# Patient Record
Sex: FEMALE | Race: BLACK OR AFRICAN AMERICAN | NOT HISPANIC OR LATINO | URBAN - METROPOLITAN AREA
[De-identification: names, ages, dates, MRNs, and addresses within clinical notes are randomized per-mention and may not be internally consistent; named-entity substitution may affect disease eponyms.]

---

## 2017-10-06 RX ADMIN — Medication 650 MILLIGRAM(S): at 21:30

## 2018-10-06 ENCOUNTER — EMERGENCY (EMERGENCY)
Facility: HOSPITAL | Age: 84
LOS: 1 days | Discharge: ANOTHER HOSPITAL | End: 2018-10-06
Attending: STUDENT IN AN ORGANIZED HEALTH CARE EDUCATION/TRAINING PROGRAM
Payer: COMMERCIAL

## 2018-10-06 VITALS
TEMPERATURE: 97 F | RESPIRATION RATE: 18 BRPM | OXYGEN SATURATION: 100 % | DIASTOLIC BLOOD PRESSURE: 58 MMHG | HEART RATE: 95 BPM | SYSTOLIC BLOOD PRESSURE: 145 MMHG

## 2018-10-06 PROCEDURE — 73562 X-RAY EXAM OF KNEE 3: CPT | Mod: 26,RT

## 2018-10-06 PROCEDURE — 73130 X-RAY EXAM OF HAND: CPT | Mod: 26,RT

## 2018-10-06 PROCEDURE — 99285 EMERGENCY DEPT VISIT HI MDM: CPT | Mod: 25

## 2018-10-06 PROCEDURE — 70450 CT HEAD/BRAIN W/O DYE: CPT | Mod: 26

## 2018-10-06 PROCEDURE — 72125 CT NECK SPINE W/O DYE: CPT | Mod: 26

## 2018-10-06 PROCEDURE — 72190 X-RAY EXAM OF PELVIS: CPT | Mod: 26

## 2018-10-06 PROCEDURE — 70486 CT MAXILLOFACIAL W/O DYE: CPT | Mod: 26

## 2018-10-06 RX ORDER — ACETAMINOPHEN 500 MG
650 TABLET ORAL ONCE
Qty: 0 | Refills: 0 | Status: COMPLETED | OUTPATIENT
Start: 2018-10-06 | End: 2018-10-06

## 2018-10-06 RX ADMIN — Medication 650 MILLIGRAM(S): at 20:40

## 2018-10-06 NOTE — ED PROVIDER NOTE - MEDICAL DECISION MAKING DETAILS
patient presenting with mechanical fall. denies cp, sob, n, loc to suggest acs as cause of fall. will obtain xray to r.o fracture. ct head and cspine to r.o ich.

## 2018-10-06 NOTE — ED PROVIDER NOTE - PROGRESS NOTE DETAILS
Patient had incidental finding on ct head, patient remain neurologically intact. transferred to [facility] for further eval by neurosurg concern for tumor. patient protecting airway, aox3 on transfer

## 2018-10-06 NOTE — ED ADULT TRIAGE NOTE - CHIEF COMPLAINT QUOTE
s/p fall forward 3 mins ago,fell forward hit head,no loc nose swelling,headache, no nausea no vomiting

## 2018-10-06 NOTE — ED ADULT NURSE NOTE - OBJECTIVE STATEMENT
Pt stated "I fell, I was trying to go to the bathroom and I fell". Denies loc or headache. Abrasion noted to left side of forehead.

## 2018-10-06 NOTE — ED PROVIDER NOTE - ENMT, MLM
Airway patent, Nasal mucosa clear. Mouth with normal mucosa. Throat has no vesicles, no oropharyngeal exudates and uvula is midline.  left forehead hematoma noted on exam

## 2018-10-06 NOTE — ED PROVIDER NOTE - OBJECTIVE STATEMENT
89 y.o presenting with s.p mechanical fall. denies cp, sob, n, v, abd pain leading to fall. endorses pain to forehead. denies loc, neck pain.

## 2018-10-06 NOTE — ED ADULT NURSE NOTE - NSIMPLEMENTINTERV_GEN_ALL_ED
Implemented All Fall with Harm Risk Interventions:  [place] to call system. Call bell, personal items and telephone within reach. Instruct patient to call for assistance. Room bathroom lighting operational. Non-slip footwear when patient is off stretcher. Physically safe environment: no spills, clutter or unnecessary equipment. Stretcher in lowest position, wheels locked, appropriate side rails in place. Provide visual cue, wrist band, yellow gown, etc. Monitor gait and stability. Monitor for mental status changes and reorient to person, place, and time. Review medications for side effects contributing to fall risk. Reinforce activity limits and safety measures with patient and family. Provide visual clues: red socks.

## 2018-10-06 NOTE — ED PROVIDER NOTE - PMH
Anemia    Anxiety    Arthritis    Cataract    Cervical spinal stenosis    Diabetes mellitus  Type 2 IDDM  DUB (dysfunctional uterine bleeding)  hysterectomy age 31's  Dyslipidemia    HTN (hypertension)    Obesity    Spinal stenosis    Spinal stenosis, lumbar

## 2018-10-07 ENCOUNTER — INPATIENT (INPATIENT)
Facility: HOSPITAL | Age: 84
LOS: 2 days | Discharge: HOME | DRG: 54 | End: 2018-10-10
Attending: HOSPITALIST | Admitting: HOSPITALIST
Payer: COMMERCIAL

## 2018-10-07 VITALS
RESPIRATION RATE: 18 BRPM | OXYGEN SATURATION: 98 % | DIASTOLIC BLOOD PRESSURE: 74 MMHG | HEART RATE: 96 BPM | SYSTOLIC BLOOD PRESSURE: 147 MMHG | TEMPERATURE: 98 F

## 2018-10-07 VITALS — DIASTOLIC BLOOD PRESSURE: 78 MMHG | SYSTOLIC BLOOD PRESSURE: 156 MMHG | HEART RATE: 67 BPM | RESPIRATION RATE: 16 BRPM

## 2018-10-07 DIAGNOSIS — Z79.899 OTHER LONG TERM (CURRENT) DRUG THERAPY: ICD-10-CM

## 2018-10-07 DIAGNOSIS — S02.2XXA FRACTURE OF NASAL BONES, INITIAL ENCOUNTER FOR CLOSED FRACTURE: ICD-10-CM

## 2018-10-07 DIAGNOSIS — S62.90XA UNSPECIFIED FRACTURE OF UNSPECIFIED WRIST AND HAND, INITIAL ENCOUNTER FOR CLOSED FRACTURE: ICD-10-CM

## 2018-10-07 DIAGNOSIS — D64.9 ANEMIA, UNSPECIFIED: ICD-10-CM

## 2018-10-07 DIAGNOSIS — G93.9 DISORDER OF BRAIN, UNSPECIFIED: ICD-10-CM

## 2018-10-07 DIAGNOSIS — E11.9 TYPE 2 DIABETES MELLITUS WITHOUT COMPLICATIONS: ICD-10-CM

## 2018-10-07 DIAGNOSIS — N18.3 CHRONIC KIDNEY DISEASE, STAGE 3 (MODERATE): ICD-10-CM

## 2018-10-07 DIAGNOSIS — D72.829 ELEVATED WHITE BLOOD CELL COUNT, UNSPECIFIED: ICD-10-CM
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
## 2018-10-07 DIAGNOSIS — S69.91XA UNSPECIFIED INJURY OF RIGHT WRIST, HAND AND FINGER(S), INITIAL ENCOUNTER: ICD-10-CM

## 2018-10-07 LAB
ANION GAP SERPL CALC-SCNC: 10 MMOL/L — SIGNIFICANT CHANGE UP (ref 5–17)
ANION GAP SERPL CALC-SCNC: 11 MMOL/L — SIGNIFICANT CHANGE UP (ref 5–17)
APPEARANCE UR: ABNORMAL
APTT BLD: 36.8 SEC — SIGNIFICANT CHANGE UP (ref 27.5–37.4)
BACTERIA # UR AUTO: ABNORMAL
BILIRUB UR-MCNC: NEGATIVE — SIGNIFICANT CHANGE UP
BUN SERPL-MCNC: 14 MG/DL — SIGNIFICANT CHANGE UP (ref 7–18)
BUN SERPL-MCNC: 16 MG/DL — SIGNIFICANT CHANGE UP (ref 7–23)
CALCIUM SERPL-MCNC: 8.6 MG/DL — SIGNIFICANT CHANGE UP (ref 8.4–10.5)
CALCIUM SERPL-MCNC: 9 MG/DL — SIGNIFICANT CHANGE UP (ref 8.4–10.5)
CHLORIDE SERPL-SCNC: 108 MMOL/L — SIGNIFICANT CHANGE UP (ref 96–108)
CHLORIDE SERPL-SCNC: 110 MMOL/L — HIGH (ref 96–108)
CO2 SERPL-SCNC: 23 MMOL/L — SIGNIFICANT CHANGE UP (ref 22–31)
CO2 SERPL-SCNC: 24 MMOL/L — SIGNIFICANT CHANGE UP (ref 22–31)
COLOR SPEC: YELLOW — SIGNIFICANT CHANGE UP
CREAT SERPL-MCNC: 0.92 MG/DL — SIGNIFICANT CHANGE UP (ref 0.5–1.3)
CREAT SERPL-MCNC: 0.96 MG/DL — SIGNIFICANT CHANGE UP (ref 0.5–1.3)
DIFF PNL FLD: NEGATIVE — SIGNIFICANT CHANGE UP
EPI CELLS # UR: 20 /HPF — HIGH
GLUCOSE BLDC GLUCOMTR-MCNC: 226 MG/DL — HIGH (ref 70–99)
GLUCOSE BLDC GLUCOMTR-MCNC: 247 MG/DL — HIGH (ref 70–99)
GLUCOSE BLDC GLUCOMTR-MCNC: 253 MG/DL — HIGH (ref 70–99)
GLUCOSE BLDC GLUCOMTR-MCNC: 296 MG/DL — HIGH (ref 70–99)
GLUCOSE SERPL-MCNC: 123 MG/DL — HIGH (ref 70–99)
GLUCOSE SERPL-MCNC: 220 MG/DL — HIGH (ref 70–99)
GLUCOSE UR QL: NEGATIVE — SIGNIFICANT CHANGE UP
HCT VFR BLD CALC: 29.8 % — LOW (ref 34.5–45)
HCT VFR BLD CALC: 32.8 % — LOW (ref 34.5–45)
HGB BLD-MCNC: 10.5 G/DL — LOW (ref 11.5–15.5)
HGB BLD-MCNC: 9.4 G/DL — LOW (ref 11.5–15.5)
HYALINE CASTS # UR AUTO: 14 /LPF — HIGH (ref 0–2)
INR BLD: 1.04 RATIO — SIGNIFICANT CHANGE UP (ref 0.88–1.16)
KETONES UR-MCNC: ABNORMAL
LEUKOCYTE ESTERASE UR-ACNC: ABNORMAL
MCHC RBC-ENTMCNC: 26 PG — LOW (ref 27–34)
MCHC RBC-ENTMCNC: 26 PG — LOW (ref 27–34)
MCHC RBC-ENTMCNC: 31.5 GM/DL — LOW (ref 32–36)
MCHC RBC-ENTMCNC: 32 GM/DL — SIGNIFICANT CHANGE UP (ref 32–36)
MCV RBC AUTO: 81.2 FL — SIGNIFICANT CHANGE UP (ref 80–100)
MCV RBC AUTO: 82.3 FL — SIGNIFICANT CHANGE UP (ref 80–100)
NITRITE UR-MCNC: NEGATIVE — SIGNIFICANT CHANGE UP
PH UR: 6 — SIGNIFICANT CHANGE UP (ref 5–8)
PLATELET # BLD AUTO: 293 K/UL — SIGNIFICANT CHANGE UP (ref 150–400)
PLATELET # BLD AUTO: 296 K/UL — SIGNIFICANT CHANGE UP (ref 150–400)
POTASSIUM SERPL-MCNC: 3.5 MMOL/L — SIGNIFICANT CHANGE UP (ref 3.5–5.3)
POTASSIUM SERPL-MCNC: 3.7 MMOL/L — SIGNIFICANT CHANGE UP (ref 3.5–5.3)
POTASSIUM SERPL-SCNC: 3.5 MMOL/L — SIGNIFICANT CHANGE UP (ref 3.5–5.3)
POTASSIUM SERPL-SCNC: 3.7 MMOL/L — SIGNIFICANT CHANGE UP (ref 3.5–5.3)
PROT UR-MCNC: ABNORMAL
PROTHROM AB SERPL-ACNC: 11.4 SEC — SIGNIFICANT CHANGE UP (ref 9.8–12.7)
RBC # BLD: 3.62 M/UL — LOW (ref 3.8–5.2)
RBC # BLD: 4.03 M/UL — SIGNIFICANT CHANGE UP (ref 3.8–5.2)
RBC # FLD: 13.1 % — SIGNIFICANT CHANGE UP (ref 10.3–14.5)
RBC # FLD: 15 % — HIGH (ref 10.3–14.5)
RBC CASTS # UR COMP ASSIST: 3 /HPF — SIGNIFICANT CHANGE UP (ref 0–4)
SODIUM SERPL-SCNC: 143 MMOL/L — SIGNIFICANT CHANGE UP (ref 135–145)
SODIUM SERPL-SCNC: 143 MMOL/L — SIGNIFICANT CHANGE UP (ref 135–145)
SP GR SPEC: 1.02 — SIGNIFICANT CHANGE UP (ref 1.01–1.02)
UROBILINOGEN FLD QL: NEGATIVE — SIGNIFICANT CHANGE UP
WBC # BLD: 11.9 K/UL — HIGH (ref 3.8–10.5)
WBC # BLD: 6.8 K/UL — SIGNIFICANT CHANGE UP (ref 3.8–10.5)
WBC # FLD AUTO: 11.9 K/UL — HIGH (ref 3.8–10.5)
WBC # FLD AUTO: 6.8 K/UL — SIGNIFICANT CHANGE UP (ref 3.8–10.5)
WBC UR QL: 15 /HPF — HIGH (ref 0–5)

## 2018-10-07 PROCEDURE — 12345: CPT | Mod: NC

## 2018-10-07 PROCEDURE — 85730 THROMBOPLASTIN TIME PARTIAL: CPT

## 2018-10-07 PROCEDURE — 70450 CT HEAD/BRAIN W/O DYE: CPT

## 2018-10-07 PROCEDURE — 73140 X-RAY EXAM OF FINGER(S): CPT | Mod: 26,RT

## 2018-10-07 PROCEDURE — 85027 COMPLETE CBC AUTOMATED: CPT

## 2018-10-07 PROCEDURE — 99285 EMERGENCY DEPT VISIT HI MDM: CPT | Mod: 25

## 2018-10-07 PROCEDURE — 70553 MRI BRAIN STEM W/O & W/DYE: CPT | Mod: 26

## 2018-10-07 PROCEDURE — 72125 CT NECK SPINE W/O DYE: CPT

## 2018-10-07 PROCEDURE — 70486 CT MAXILLOFACIAL W/O DYE: CPT

## 2018-10-07 PROCEDURE — 72190 X-RAY EXAM OF PELVIS: CPT

## 2018-10-07 PROCEDURE — 80048 BASIC METABOLIC PNL TOTAL CA: CPT

## 2018-10-07 PROCEDURE — 85610 PROTHROMBIN TIME: CPT

## 2018-10-07 PROCEDURE — 73130 X-RAY EXAM OF HAND: CPT

## 2018-10-07 PROCEDURE — 99223 1ST HOSP IP/OBS HIGH 75: CPT

## 2018-10-07 PROCEDURE — 73562 X-RAY EXAM OF KNEE 3: CPT

## 2018-10-07 PROCEDURE — 73564 X-RAY EXAM KNEE 4 OR MORE: CPT | Mod: 26,RT

## 2018-10-07 RX ORDER — INSULIN ASPART 100 [IU]/ML
0 INJECTION, SOLUTION SUBCUTANEOUS
Qty: 0 | Refills: 0 | COMMUNITY

## 2018-10-07 RX ORDER — SIMVASTATIN 20 MG/1
1 TABLET, FILM COATED ORAL
Qty: 0 | Refills: 0 | COMMUNITY

## 2018-10-07 RX ORDER — LANSOPRAZOLE 15 MG/1
1 CAPSULE, DELAYED RELEASE ORAL
Qty: 0 | Refills: 0 | COMMUNITY

## 2018-10-07 RX ORDER — FOLIC ACID 0.8 MG
1 TABLET ORAL
Qty: 0 | Refills: 0 | COMMUNITY

## 2018-10-07 RX ORDER — HYDRALAZINE HCL 50 MG
1 TABLET ORAL
Qty: 0 | Refills: 0 | COMMUNITY

## 2018-10-07 RX ORDER — DEXTROSE 50 % IN WATER 50 %
12.5 SYRINGE (ML) INTRAVENOUS ONCE
Qty: 0 | Refills: 0 | Status: DISCONTINUED | OUTPATIENT
Start: 2018-10-07 | End: 2018-10-10

## 2018-10-07 RX ORDER — LEVETIRACETAM 250 MG/1
500 TABLET, FILM COATED ORAL
Qty: 0 | Refills: 0 | Status: DISCONTINUED | OUTPATIENT
Start: 2018-10-07 | End: 2018-10-09

## 2018-10-07 RX ORDER — DEXTROSE 50 % IN WATER 50 %
15 SYRINGE (ML) INTRAVENOUS ONCE
Qty: 0 | Refills: 0 | Status: DISCONTINUED | OUTPATIENT
Start: 2018-10-07 | End: 2018-10-10

## 2018-10-07 RX ORDER — AMLODIPINE BESYLATE 2.5 MG/1
1 TABLET ORAL
Qty: 0 | Refills: 0 | COMMUNITY

## 2018-10-07 RX ORDER — CHOLECALCIFEROL (VITAMIN D3) 125 MCG
1000 CAPSULE ORAL DAILY
Qty: 0 | Refills: 0 | Status: DISCONTINUED | OUTPATIENT
Start: 2018-10-07 | End: 2018-10-10

## 2018-10-07 RX ORDER — OXYCODONE HYDROCHLORIDE 5 MG/1
5 TABLET ORAL EVERY 6 HOURS
Qty: 0 | Refills: 0 | Status: DISCONTINUED | OUTPATIENT
Start: 2018-10-07 | End: 2018-10-10

## 2018-10-07 RX ORDER — HYDRALAZINE HCL 50 MG
25 TABLET ORAL EVERY 12 HOURS
Qty: 0 | Refills: 0 | Status: DISCONTINUED | OUTPATIENT
Start: 2018-10-07 | End: 2018-10-10

## 2018-10-07 RX ORDER — INSULIN LISPRO 100/ML
VIAL (ML) SUBCUTANEOUS
Qty: 0 | Refills: 0 | Status: DISCONTINUED | OUTPATIENT
Start: 2018-10-07 | End: 2018-10-10

## 2018-10-07 RX ORDER — INSULIN DEGLUDEC 100 U/ML
0 INJECTION, SOLUTION SUBCUTANEOUS
Qty: 0 | Refills: 0 | COMMUNITY

## 2018-10-07 RX ORDER — GLUCAGON INJECTION, SOLUTION 0.5 MG/.1ML
1 INJECTION, SOLUTION SUBCUTANEOUS ONCE
Qty: 0 | Refills: 0 | Status: DISCONTINUED | OUTPATIENT
Start: 2018-10-07 | End: 2018-10-10

## 2018-10-07 RX ORDER — DEXTROSE 50 % IN WATER 50 %
25 SYRINGE (ML) INTRAVENOUS ONCE
Qty: 0 | Refills: 0 | Status: DISCONTINUED | OUTPATIENT
Start: 2018-10-07 | End: 2018-10-10

## 2018-10-07 RX ORDER — INSULIN DEGLUDEC 100 U/ML
30 INJECTION, SOLUTION SUBCUTANEOUS
Qty: 0 | Refills: 0 | COMMUNITY

## 2018-10-07 RX ORDER — FOLIC ACID 0.8 MG
1 TABLET ORAL DAILY
Qty: 0 | Refills: 0 | Status: DISCONTINUED | OUTPATIENT
Start: 2018-10-07 | End: 2018-10-10

## 2018-10-07 RX ORDER — INSULIN LISPRO 100/ML
VIAL (ML) SUBCUTANEOUS AT BEDTIME
Qty: 0 | Refills: 0 | Status: DISCONTINUED | OUTPATIENT
Start: 2018-10-07 | End: 2018-10-10

## 2018-10-07 RX ORDER — TETANUS TOXOID, REDUCED DIPHTHERIA TOXOID AND ACELLULAR PERTUSSIS VACCINE, ADSORBED 5; 2.5; 8; 8; 2.5 [IU]/.5ML; [IU]/.5ML; UG/.5ML; UG/.5ML; UG/.5ML
0.5 SUSPENSION INTRAMUSCULAR ONCE
Qty: 0 | Refills: 0 | Status: COMPLETED | OUTPATIENT
Start: 2018-10-07 | End: 2018-10-07

## 2018-10-07 RX ORDER — METHIMAZOLE 10 MG/1
1 TABLET ORAL
Qty: 0 | Refills: 0 | COMMUNITY

## 2018-10-07 RX ORDER — SIMVASTATIN 20 MG/1
20 TABLET, FILM COATED ORAL AT BEDTIME
Qty: 0 | Refills: 0 | Status: DISCONTINUED | OUTPATIENT
Start: 2018-10-07 | End: 2018-10-10

## 2018-10-07 RX ORDER — LEVETIRACETAM 250 MG/1
500 TABLET, FILM COATED ORAL ONCE
Qty: 0 | Refills: 0 | Status: COMPLETED | OUTPATIENT
Start: 2018-10-07 | End: 2018-10-07

## 2018-10-07 RX ORDER — ACETAMINOPHEN 500 MG
650 TABLET ORAL EVERY 6 HOURS
Qty: 0 | Refills: 0 | Status: DISCONTINUED | OUTPATIENT
Start: 2018-10-07 | End: 2018-10-10

## 2018-10-07 RX ORDER — PANTOPRAZOLE SODIUM 20 MG/1
40 TABLET, DELAYED RELEASE ORAL
Qty: 0 | Refills: 0 | Status: DISCONTINUED | OUTPATIENT
Start: 2018-10-07 | End: 2018-10-10

## 2018-10-07 RX ORDER — INSULIN ASPART 100 [IU]/ML
6 INJECTION, SOLUTION SUBCUTANEOUS
Qty: 0 | Refills: 0 | COMMUNITY

## 2018-10-07 RX ORDER — INSULIN GLARGINE 100 [IU]/ML
5 INJECTION, SOLUTION SUBCUTANEOUS AT BEDTIME
Qty: 0 | Refills: 0 | Status: DISCONTINUED | OUTPATIENT
Start: 2018-10-07 | End: 2018-10-10

## 2018-10-07 RX ORDER — BENAZEPRIL HYDROCHLORIDE 40 MG/1
1 TABLET ORAL
Qty: 0 | Refills: 0 | COMMUNITY

## 2018-10-07 RX ORDER — AMLODIPINE BESYLATE 2.5 MG/1
10 TABLET ORAL DAILY
Qty: 0 | Refills: 0 | Status: DISCONTINUED | OUTPATIENT
Start: 2018-10-07 | End: 2018-10-10

## 2018-10-07 RX ADMIN — Medication 25 MILLIGRAM(S): at 17:18

## 2018-10-07 RX ADMIN — LEVETIRACETAM 500 MILLIGRAM(S): 250 TABLET, FILM COATED ORAL at 02:25

## 2018-10-07 RX ADMIN — OXYCODONE HYDROCHLORIDE 5 MILLIGRAM(S): 5 TABLET ORAL at 18:35

## 2018-10-07 RX ADMIN — Medication 2: at 13:12

## 2018-10-07 RX ADMIN — Medication 1 MILLIGRAM(S): at 13:35

## 2018-10-07 RX ADMIN — Medication 25 MILLIGRAM(S): at 06:12

## 2018-10-07 RX ADMIN — LEVETIRACETAM 500 MILLIGRAM(S): 250 TABLET, FILM COATED ORAL at 17:18

## 2018-10-07 RX ADMIN — Medication 3: at 09:09

## 2018-10-07 RX ADMIN — OXYCODONE HYDROCHLORIDE 5 MILLIGRAM(S): 5 TABLET ORAL at 17:48

## 2018-10-07 RX ADMIN — TETANUS TOXOID, REDUCED DIPHTHERIA TOXOID AND ACELLULAR PERTUSSIS VACCINE, ADSORBED 0.5 MILLILITER(S): 5; 2.5; 8; 8; 2.5 SUSPENSION INTRAMUSCULAR at 02:25

## 2018-10-07 RX ADMIN — INSULIN GLARGINE 5 UNIT(S): 100 INJECTION, SOLUTION SUBCUTANEOUS at 22:04

## 2018-10-07 RX ADMIN — LEVETIRACETAM 500 MILLIGRAM(S): 250 TABLET, FILM COATED ORAL at 07:53

## 2018-10-07 RX ADMIN — SIMVASTATIN 20 MILLIGRAM(S): 20 TABLET, FILM COATED ORAL at 22:04

## 2018-10-07 RX ADMIN — AMLODIPINE BESYLATE 10 MILLIGRAM(S): 2.5 TABLET ORAL at 06:12

## 2018-10-07 RX ADMIN — Medication 3: at 17:49

## 2018-10-07 NOTE — H&P ADULT - PROBLEM SELECTOR PLAN 8
-clarify home insulin regimen and complete med rec in AM (primary team responsible)  -Will place on low-dose basal bolus insulin therapy for now  -check fsg TID and bedtime  -monitor for hypoglycemia

## 2018-10-07 NOTE — CHART NOTE - NSCHARTNOTEFT_GEN_A_CORE
ISTOP: [id number] prescribed by Dr. Yoko Braun. Received alprazolam 0.25 mg 30 supply for 90 tablets last picked up on 9/10/2018. Percocet 120 tablets for 30 day supply last picked up 6/29/2018

## 2018-10-07 NOTE — H&P ADULT - NSHPLABSRESULTS_GEN_ALL_CORE
10.5   11.9  )-----------( 296      ( 07 Oct 2018 00:22 )             32.8       10    143  |  110<H>  |  14  ----------------------------<  123<H>  3.5   |  23  |  0.92    Ca    9.0      07 Oct 2018 00:22         PT/INR - ( 07 Oct 2018 00:22 )   PT: 11.4 sec;   INR: 1.04 ratio         PTT - ( 07 Oct 2018 00:22 )  PTT:36.8 sec       Urinalysis Basic - ( 07 Oct 2018 02:03 )    Color: Yellow / Appearance: Slightly Turbid / S.024 / pH: x  Gluc: x / Ketone: Small  / Bili: Negative / Urobili: Negative   Blood: x / Protein: 100 mg/dL / Nitrite: Negative   Leuk Esterase: Large / RBC: 3 /hpf / WBC 15 /hpf   Sq Epi: x / Non Sq Epi: 20 /hpf / Bacteria: Moderate    I personally reviewed & interpreted the lab findings above; CBC c/w mild leukocytosis and mild unchanged anemia from past. BMP c/w CKD III  I personally reviewed & interpreted the radiographic findings; Head CT c/w abnormal lesions on left frontal lobe  I personally reviewed & interpreted the EKG findings; Nonischemic

## 2018-10-07 NOTE — ED PROVIDER NOTE - MEDICAL DECISION MAKING DETAILS
89F s/p fall, presenting with an accidental finding of a brain mass. Will consult neurosx, will get basics, CT scan, admit for syncope workup and cancer workup.

## 2018-10-07 NOTE — CONSULT NOTE ADULT - SUBJECTIVE AND OBJECTIVE BOX
p ([id number])     HPI: 89F sent in from [facility] for neurosx eval. Pt had a fall today, head injury, no LOC and no ACs, unknown mechanism of fall, no dizziness before or after the fall. Pt denies chest pain, back pain or abd pain. Pt also denies any focal deficits. No change in vision. Pt has pain in the rt. middle finger and rt. knee. Pt's sent in for an abnormal lesion along the medial left frontal lobe with mild adjacent vasogenic edema and mild mass effect upon the frontal horn of the left lateral ventricle.Patient denies current headache, weakness, numbness, blurry vision, double vision, n/v.    PAST MEDICAL HISTORY   Cervical spinal stenosis  Anemia  DUB (dysfunctional uterine bleeding)  Cataract  Spinal stenosis  Obesity  HTN (hypertension)  Anxiety  Dyslipidemia  Spinal stenosis, lumbar  Arthritis  Diabetes mellitus    PAST SURGICAL HISTORY   Cervical spinal stenosis  L3- L5 laminectomy with fusion  History of colon polyps removal  History of total knee replacement  Hx of cataract surgery  H/O mastoidectomy  History of cholecystectomy  History of appendectomy  History of hysterectomy    aspirin (Stomach Upset)  codeine (Rash)  iodine (Swelling)  IV Contrast (Short breath)  penicillin (Swelling)  vancomycin (Hives)      MEDICATIONS:  Antibiotics:    Neuro:  levETIRAcetam 500 milliGRAM(s) Oral once    Anticoagulation:    Other:  diphtheria/tetanus/pertussis (acellular) Vaccine (ADAcel) 0.5 milliLiter(s) IntraMuscular once      SOCIAL HISTORY:   Occupation:   Marital Status:     FAMILY HISTORY:  No pertinent family history in first degree relatives      REVIEW OF SYSTEMS:  negative but for hpi  General:  Eyes:  ENT:  Cardiac:  Respiratory:  GI:  Musculoskeletal:   Skin:  Neurologic:   Psychiatric:     PHYSICAL EXAMINATION:   T(C): 36.6 (10-07-18 @ 01:29), Max: 36.6 (10-07-18 @ 01:29)  HR: 90 (10-07-18 @ 01:52) (67 - 96)  BP: 161/87 (10-07-18 @ 01:52) (145/58 - 161/87)  RR: 16 (10-07-18 @ 01:52) (16 - 18)  SpO2: 98% (10-07-18 @ 01:52) (98% - 100%)  Wt(kg): --    General Examination:     Neurologic Examination:           AOx3, FC, PERRL, EOMI, V1-3 intact, no facial, palate danette symmetric, tongue midline, shrug 5/5  5/5 throughout, no drift  SILT  No clonus or babinski    Left forehead abrasion, right knee and finger pain    LABS:                        10.5   11.9  )-----------( 296      ( 07 Oct 2018 00:22 )             32.8     10-07    143  |  110<H>  |  14  ----------------------------<  123<H>  3.5   |  23  |  0.92    Ca    9.0      07 Oct 2018 00:22      PT/INR - ( 07 Oct 2018 00:22 )   PT: 11.4 sec;   INR: 1.04 ratio         PTT - ( 07 Oct 2018 00:22 )  PTT:36.8 sec      RADIOLOGY & ADDITIONAL STUDIES:      IMPRESSION:    CT BRAIN: No acute intracranial hemorrhage. Abnormal lesion along the   medial left frontal lobe with mild adjacent vasogenic edema and mild mass   effect upon the frontal horn of the left lateral ventricle. It is not   clear whether this is intra or extra-axial. There is no midline shift or   herniation. MRI of the brain with and without contrast recommended for   further evaluation if there are no contraindications.    CERVICAL SPINE CT: No acute cervical spine fracture. Posterior fusion   hardware and laminectomies noted.                CELIA PHILIPPE M.D., ATTENDING RADIOLOGIST  This document has been electronically signed. Oct  6 2018 10:51PM

## 2018-10-07 NOTE — PROGRESS NOTE ADULT - PROBLEM SELECTOR PLAN 7
-Pt with right 3rd distal phalanx fracture with ecchymosis  -D/w hand sx pending full eval   -tylenol prn for pain control

## 2018-10-07 NOTE — H&P ADULT - NSHPREVIEWOFSYSTEMS_GEN_ALL_CORE
REVIEW OF SYSTEMS:    CONSTITUTIONAL: No fevers or chills  ENMT:  No ear pain, No vertigo or throat pain  EYES: No visual changes  or photophobia  NECK: No pain or stiffness  RESPIRATORY: No cough, wheezing, hemoptysis; No shortness of breath  CARDIOVASCULAR: No chest pain or palpitations  GASTROINTESTINAL: No abdominal or epigastric pain. No nausea, vomiting, or hematemesis; No diarrhea or constipation. No melena or hematochezia.  MUSCULOSKELETAL: No joint swelling  or warmth.  GENITOURINARY: No dysuria, frequency or hematuria  NEUROLOGICAL: No numbness or syncope. +fall  PSYCHIATRIC: No depression or anhedonia.  ENDOCRINE: No sx hypoglycemic episodes.  SKIN: No itching, burning, rashes, or lesions

## 2018-10-07 NOTE — CONSULT NOTE ADULT - ASSESSMENT
89F s/p fall possible syncopal episodefound to have possible brain mass on CTH   - No acute neurosurgical intervention  - Medicine eval for metastatic work up  - CT CAP  - MRI brain  w w/o  - Keppra 500BID for sz ppx  - At this time given lack of clear symptoms 2/2 mass and edema and morbidity of steroids would not recommend steroids, however if symptoms develop would recommned dec 4mg q6hrs

## 2018-10-07 NOTE — H&P ADULT - ASSESSMENT
89 yo F w/ hx of DM2, HTN, OA, spinal stenosis, CKD III, chronic anemia, hyperthyroidism presents after a fall found to have brain lesions.

## 2018-10-07 NOTE — H&P ADULT - PROBLEM SELECTOR PLAN 3
-renally dose meds  -avoid nephrotoxins  -family and pt agree w/ mri w/ contrast despite risk of contrast injury.

## 2018-10-07 NOTE — H&P ADULT - NSHPPHYSICALEXAM_GEN_ALL_CORE
PHYSICAL EXAM:  Vital Signs Last 24 Hrs  T(C): 36.8 (10-07-18 @ 01:52)  T(F): 98.2 (10-07-18 @ 01:52), Max: 98.2 (10-07-18 @ 01:52)  HR: 90 (10-07-18 @ 01:52) (67 - 96)  BP: 161/87 (10-07-18 @ 01:52)  BP(mean): --  RR: 16 (10-07-18 @ 01:52) (16 - 18)  SpO2: 98% (10-07-18 @ 01:52) (98% - 100%)  Wt(kg): --    Constitutional: NAD, awake and alert  EYES: EOMI  ENT:  +Hard of Hearing, no tonsillar exudates   Neck: Soft and supple , no thyromegaly   Respiratory: Breath sounds are clear bilaterally, No wheezing, rales or rhonchi  Cardiovascular: S1 and S2, regular rate and rhythm, no Murmurs, gallops or rubs, no JVD,    Gastrointestinal: Bowel Sounds present, soft, nontender, nondistended, no guarding, no rebound  Extremities: No cyanosis or clubbing; warm to touch  Vascular: 2+ peripheral pulses lower ex  Neurological: No focal deficits, CN II-XII intact bilaterally, sensation to light touch intact in all extremities, gait intact. Pupils are equally reactive to light and symmetrical in size.   Musculoskeletal: 5/5 strength b/l upper and lower extremities; no joint swelling.  Skin: No rashes  Psych: no depression or anhedonia, AAOx3  HEME: no bruises, no nose bleeds PHYSICAL EXAM:  Vital Signs Last 24 Hrs  T(C): 36.8 (10-07-18 @ 01:52)  T(F): 98.2 (10-07-18 @ 01:52), Max: 98.2 (10-07-18 @ 01:52)  HR: 90 (10-07-18 @ 01:52) (67 - 96)  BP: 161/87 (10-07-18 @ 01:52)  BP(mean): --  RR: 16 (10-07-18 @ 01:52) (16 - 18)  SpO2: 98% (10-07-18 @ 01:52) (98% - 100%)  Wt(kg): --    Constitutional: NAD, awake and alert  EYES: EOMI  ENT:  +Hard of Hearing, no tonsillar exudates   Neck: Soft and supple , no thyromegaly   Respiratory: Breath sounds are clear bilaterally, No wheezing, rales or rhonchi  Cardiovascular: S1 and S2, regular rate and rhythm, no Murmurs, gallops or rubs, no JVD,    Gastrointestinal: Bowel Sounds present, soft, nontender, nondistended, no guarding, no rebound  Extremities: No cyanosis or clubbing; warm to touch  Vascular: 2+ peripheral pulses lower ex; radial pulses strong and intact in both hands.   Neurological: No focal deficits, CN II-XII intact bilaterally, sensation to light touch intact in all extremities, gait deferred; Pupils are equally reactive to light and symmetrical in size.   Musculoskeletal: 5/5 strength b/l upper and lower extremities; no joint swelling. No pain on palpation of trochanteric bursa or groin region.  Skin: +frontal head bruise. +right knee bruise. Right index finger ecchymotic.   Psych: no depression or anhedonia, AAOx3  HEME: no nose bleeds. No cervical lymphadenopathy

## 2018-10-07 NOTE — H&P ADULT - HISTORY OF PRESENT ILLNESS
88 yo F w/ hx of DM2, HTN, OA, spinal stenosis, CKD III, chronic anemia, hyperthyroidism presents after a fall. Patient reports she was at home, got up to use the bathroom and lost balance. She fell forwarded, landed on right knee and right outstretched hand. Patient reports also head injury. She did not lose consciousness. She did not have any prodrome symptoms or chest pain. She denies urinary/fecal incontinence or post-ictal state after the fall. The fall was witnessed by her daughter who helped her get up. The patient was able to bear weight and ambulate without any hip or groin pain. She was driven by her daughter to [facility]. Head imaging there was found to have concern for brain mass and patient was transferred to our hospital. Patient currently has no symptoms.

## 2018-10-07 NOTE — PROGRESS NOTE ADULT - PROBLEM SELECTOR PLAN 5
-This is likely leukemoid rxn, no signs of infection; UA most likely dirty sample as patient is asymptomatic.  -Resolved

## 2018-10-07 NOTE — ED PROVIDER NOTE - ATTENDING CONTRIBUTION TO CARE
89 F trasnferred from [facility] for neurosx eval. Pt s/pfall of unknown mechanism today with head trauma, no LOC. CTB brain showing vasogenic mass concerning for poss malignancy. Pt denies chest pain, back pain or abd pain. Pt also denies any focal deficits. No change in vision. Pt has pain in the rt. middle finger and rt. knee. Pt's sent in for an 89 F transferred from [place] for neurosx eval. Pt s/p fall of unknown mechanism today with head trauma, no LOC. CTB brain showing vasogenic mass concerning for poss malignancy. Pt denies ha, dizziness, vision/hearing changes, neck pain, chest pain, back pain or abd pain, numbness or weakness. . Pt has pain in the rt. middle finger and rt. knee pain since fall.     GEN: no acute respiratory distress. nontoxic, speaking comfortably in full sentences   HEENT: NCAT. face symmetrical. PERRL 4mm, EOMI, MMM, oropharynx wnl.  Neck: no JVD, trachea midline, no LAD  CV: RRR. +S1S2, no murmur. 2+ pulses in 4 extremities  Chest: CTA B/l. no wheezing, rales, rhonchi. no retractions. good air movement  ABD: +BS, soft, non distended, non tender.   MSK: No clubbing, cyanosis, edema. FROM of all extremities.  right knee and middle finger ttp with FROM. No midline or paraspinal tenderness. no spinal step-offs.  Neuro: AOOX3.  CN 2-12 intact; Sensation intact, motor 5/5 throughout.    poss syncope and fall with brain lesion. right middle finger and knee pain. labs , xr, NSx consult, admit.

## 2018-10-07 NOTE — ED PROVIDER NOTE - PSH
H/O mastoidectomy  Right ear  History of appendectomy  1960's  History of cholecystectomy  1960's  History of colon polyps removal  colonoscopy 2010  History of hysterectomy  age: 30's  History of total knee replacement  right 2006  left 2011  Hx of cataract surgery  bilateral 2008  L3- L5 laminectomy with fusion  2/2013

## 2018-10-07 NOTE — H&P ADULT - PROBLEM SELECTOR PLAN 1
-Patient presents with new brain lesions. Seen by neurosx who recommends brain MRI w/ and w/o contrast. Pt has allergies IV contrast but iodinated contrast agents in setting of CT scan. D/w patient and family at bedside who agree with brain MRI despite the risk of gadolinium induced fibrosis and renal damage.   -differential includes mets, less likely HIV or infection (toxo, neurocystericosis etc...)  -hold steroids for now unless symptomatic  -keppra for seizure ppx per neurosx recommendations

## 2018-10-07 NOTE — PROGRESS NOTE ADULT - PROBLEM SELECTOR PLAN 1
-Patient presents with new left frontal lobe brain lesions.   -Seen by neurosx who recommends brain MRI w/ and w/o contrast.   -Pt has allergies IV contrast but iodinated contrast agents in setting of CT scan. D/w patient and family at bedside who agree with brain MRI despite the risk of gadolinium induced fibrosis and renal damage.   -hold steroids for now unless symptomatic  -keppra for seizure ppx per neurosx recommendations

## 2018-10-07 NOTE — ED ADULT NURSE NOTE - OBJECTIVE STATEMENT
89F bibems as transfer from [facility] ED for neurosx consult. Patient aaox4 ambulatory and independent at home with h/o HTN and DM, ata knee replacement had a fall at home and fell on her rt knee and face forward, abrasion and bruising on the forehead and nasal area and rt knee noted, [facility], denies any chills or fever, dizziness, sob, chest pain, abd pain, nausea, vomiting or urinary symptoms, VS wnl. CT scan noted lesion on the brain, pending neuro sx consult.

## 2018-10-07 NOTE — H&P ADULT - PROBLEM SELECTOR PLAN 6
-This is likely leukemoid rxn, no signs of infection -This is likely leukemoid rxn, no signs of infection; UA most likely dirty sample as patient is asymptomatic.

## 2018-10-07 NOTE — ED PROVIDER NOTE - OBJECTIVE STATEMENT
89[facility]. Pt had a fall today, head injury, no LOC and no ACs, unknown mechanism of fall, no dizziness before or after the fall. Pt denies chest pain, back pain or abd pain. Pt also denies any focal deficits. No change in vision. Pt has pain in the rt. middle finger and rt. knee. Pt's sent in for an abnormal lesion along the medial left frontal lobe with mild adjacent vasogenic edema and mild mass effect upon the frontal horn of the left lateral ventricle.

## 2018-10-07 NOTE — ED ADULT NURSE NOTE - NSIMPLEMENTINTERV_GEN_ALL_ED
Implemented All Fall with Harm Risk Interventions:  [place] to call system. Call bell, personal items and telephone within reach. Instruct patient to call for assistance. Room bathroom lighting operational. Non-slip footwear when patient is off stretcher. Physically safe environment: no spills, clutter or unnecessary equipment. Stretcher in lowest position, wheels locked, appropriate side rails in place. Provide visual cue, wrist band, yellow gown, etc. Monitor gait and stability. Monitor for mental status changes and reorient to person, place, and time. Review medications for side effects contributing to fall risk. Reinforce activity limits and safety measures with patient and family. Provide visual clues: red socks.

## 2018-10-07 NOTE — ED ADULT NURSE REASSESSMENT NOTE - NS ED NURSE REASSESS COMMENT FT1
vss; pt without complaints awaiting bed assign; family at bedside; neuro exam done; pt passed dysphagia screen; pt medicated with keppra; safety/comfort maintained

## 2018-10-07 NOTE — H&P ADULT - PROBLEM SELECTOR PLAN 4
Pharmacy team emailed to verify insulin home dose. Otherwise the other medications are accurate per patient and daughter

## 2018-10-07 NOTE — PROGRESS NOTE ADULT - SUBJECTIVE AND OBJECTIVE BOX
Patient is a 89y old  Female who presents with a chief complaint of fall (07 Oct 2018 04:18)      SUBJECTIVE / OVERNIGHT EVENTS: pt reports she feels ok, denies headache or visual changes, no cp, had bm, family at bedside     MEDICATIONS  (STANDING):  amLODIPine   Tablet 10 milliGRAM(s) Oral daily  dextrose 50% Injectable 12.5 Gram(s) IV Push once  dextrose 50% Injectable 25 Gram(s) IV Push once  dextrose 50% Injectable 25 Gram(s) IV Push once  folic acid 1 milliGRAM(s) Oral daily  hydrALAZINE 25 milliGRAM(s) Oral every 12 hours  insulin glargine Injectable (LANTUS) 5 Unit(s) SubCutaneous at bedtime  insulin lispro (HumaLOG) corrective regimen sliding scale   SubCutaneous three times a day before meals  insulin lispro (HumaLOG) corrective regimen sliding scale   SubCutaneous at bedtime  levETIRAcetam 500 milliGRAM(s) Oral two times a day  methimazole 5 milliGRAM(s) Oral daily  pantoprazole    Tablet 40 milliGRAM(s) Oral before breakfast  simvastatin 20 milliGRAM(s) Oral at bedtime    MEDICATIONS  (PRN):  acetaminophen   Tablet .. 650 milliGRAM(s) Oral every 6 hours PRN Moderate Pain (4 - 6), Severe Pain (7 - 10)  dextrose 40% Gel 15 Gram(s) Oral once PRN Blood Glucose LESS THAN 70 milliGRAM(s)/deciliter  glucagon  Injectable 1 milliGRAM(s) IntraMuscular once PRN Glucose LESS THAN 70 milligrams/deciliter        CAPILLARY BLOOD GLUCOSE      POCT Blood Glucose.: 247 mg/dL (07 Oct 2018 12:46)  POCT Blood Glucose.: 253 mg/dL (07 Oct 2018 08:53)    I&O's Summary      PHYSICAL EXAM:  GENERAL: NAD, well-developed  HEAD:  Atraumatic, Normocephalic  EYES: conjunctiva and sclera clear  NECK: No JVD  CEST/LUNG: Clear to auscultation bilaterally; No wheeze  HEART: Regular rate and rhythm; S1S2  ABDOMEN: Soft, Nontender, Nondistended; Bowel sounds present  EXTREMITIES:  2+ Peripheral Pulses, No clubbing, cyanosis, or edema  PSYCH: AAOx3  NEUROLOGY: non-focal  SKIN: No rashes or lesions    LABS:                        9.4    6.80  )-----------( 293      ( 07 Oct 2018 10:15 )             29.8     10-07    143  |  108  |  16  ----------------------------<  220<H>  3.7   |  24  |  0.96    Ca    8.6      07 Oct 2018 07:41      PT/INR - ( 07 Oct 2018 00:22 )   PT: 11.4 sec;   INR: 1.04 ratio         PTT - ( 07 Oct 2018 00:22 )  PTT:36.8 sec      Urinalysis Basic - ( 07 Oct 2018 02:03 )    Color: Yellow / Appearance: Slightly Turbid / S.024 / pH: x  Gluc: x / Ketone: Small  / Bili: Negative / Urobili: Negative   Blood: x / Protein: 100 mg/dL / Nitrite: Negative   Leuk Esterase: Large / RBC: 3 /hpf / WBC 15 /hpf   Sq Epi: x / Non Sq Epi: 20 /hpf / Bacteria: Moderate        RADIOLOGY & ADDITIONAL TESTS:    Imaging Personally Reviewed:    Consultant(s) Notes Reviewed:  neurosx     Care Discussed with Consultants/Other Providers: Hand sx

## 2018-10-08 LAB
ALBUMIN SERPL ELPH-MCNC: 3.4 G/DL — SIGNIFICANT CHANGE UP (ref 3.3–5)
ALP SERPL-CCNC: 65 U/L — SIGNIFICANT CHANGE UP (ref 40–120)
ALT FLD-CCNC: 8 U/L — LOW (ref 10–45)
ANION GAP SERPL CALC-SCNC: 10 MMOL/L — SIGNIFICANT CHANGE UP (ref 5–17)
AST SERPL-CCNC: 16 U/L — SIGNIFICANT CHANGE UP (ref 10–40)
BASOPHILS # BLD AUTO: 0 K/UL — SIGNIFICANT CHANGE UP (ref 0–0.2)
BASOPHILS NFR BLD AUTO: 0.2 % — SIGNIFICANT CHANGE UP (ref 0–2)
BILIRUB SERPL-MCNC: 0.3 MG/DL — SIGNIFICANT CHANGE UP (ref 0.2–1.2)
BUN SERPL-MCNC: 13 MG/DL — SIGNIFICANT CHANGE UP (ref 7–23)
CALCIUM SERPL-MCNC: 8.5 MG/DL — SIGNIFICANT CHANGE UP (ref 8.4–10.5)
CHLORIDE SERPL-SCNC: 109 MMOL/L — HIGH (ref 96–108)
CO2 SERPL-SCNC: 23 MMOL/L — SIGNIFICANT CHANGE UP (ref 22–31)
CREAT SERPL-MCNC: 0.98 MG/DL — SIGNIFICANT CHANGE UP (ref 0.5–1.3)
CULTURE RESULTS: SIGNIFICANT CHANGE UP
EOSINOPHIL # BLD AUTO: 0.2 K/UL — SIGNIFICANT CHANGE UP (ref 0–0.5)
EOSINOPHIL NFR BLD AUTO: 4 % — SIGNIFICANT CHANGE UP (ref 0–6)
GLUCOSE BLDC GLUCOMTR-MCNC: 151 MG/DL — HIGH (ref 70–99)
GLUCOSE BLDC GLUCOMTR-MCNC: 184 MG/DL — HIGH (ref 70–99)
GLUCOSE BLDC GLUCOMTR-MCNC: 255 MG/DL — HIGH (ref 70–99)
GLUCOSE BLDC GLUCOMTR-MCNC: 268 MG/DL — HIGH (ref 70–99)
GLUCOSE BLDC GLUCOMTR-MCNC: 310 MG/DL — HIGH (ref 70–99)
GLUCOSE SERPL-MCNC: 187 MG/DL — HIGH (ref 70–99)
HBA1C BLD-MCNC: 8.1 % — HIGH (ref 4–5.6)
HCT VFR BLD CALC: 27.5 % — LOW (ref 34.5–45)
HCT VFR BLD CALC: 30.7 % — LOW (ref 34.5–45)
HGB BLD-MCNC: 10.1 G/DL — LOW (ref 11.5–15.5)
HGB BLD-MCNC: 8.7 G/DL — LOW (ref 11.5–15.5)
LYMPHOCYTES # BLD AUTO: 1.5 K/UL — SIGNIFICANT CHANGE UP (ref 1–3.3)
LYMPHOCYTES # BLD AUTO: 28.4 % — SIGNIFICANT CHANGE UP (ref 13–44)
MAGNESIUM SERPL-MCNC: 1.7 MG/DL — SIGNIFICANT CHANGE UP (ref 1.6–2.6)
MCHC RBC-ENTMCNC: 25.9 PG — LOW (ref 27–34)
MCHC RBC-ENTMCNC: 26.9 PG — LOW (ref 27–34)
MCHC RBC-ENTMCNC: 31.6 GM/DL — LOW (ref 32–36)
MCHC RBC-ENTMCNC: 32.8 GM/DL — SIGNIFICANT CHANGE UP (ref 32–36)
MCV RBC AUTO: 81.8 FL — SIGNIFICANT CHANGE UP (ref 80–100)
MCV RBC AUTO: 81.8 FL — SIGNIFICANT CHANGE UP (ref 80–100)
MONOCYTES # BLD AUTO: 0.6 K/UL — SIGNIFICANT CHANGE UP (ref 0–0.9)
MONOCYTES NFR BLD AUTO: 11.5 % — SIGNIFICANT CHANGE UP (ref 2–14)
NEUTROPHILS # BLD AUTO: 3 K/UL — SIGNIFICANT CHANGE UP (ref 1.8–7.4)
NEUTROPHILS NFR BLD AUTO: 55.9 % — SIGNIFICANT CHANGE UP (ref 43–77)
PHOSPHATE SERPL-MCNC: 1.9 MG/DL — LOW (ref 2.5–4.5)
PLATELET # BLD AUTO: 270 K/UL — SIGNIFICANT CHANGE UP (ref 150–400)
PLATELET # BLD AUTO: 298 K/UL — SIGNIFICANT CHANGE UP (ref 150–400)
POTASSIUM SERPL-MCNC: 3.6 MMOL/L — SIGNIFICANT CHANGE UP (ref 3.5–5.3)
POTASSIUM SERPL-SCNC: 3.6 MMOL/L — SIGNIFICANT CHANGE UP (ref 3.5–5.3)
PROT SERPL-MCNC: 6.2 G/DL — SIGNIFICANT CHANGE UP (ref 6–8.3)
RBC # BLD: 3.36 M/UL — LOW (ref 3.8–5.2)
RBC # BLD: 3.75 M/UL — LOW (ref 3.8–5.2)
RBC # FLD: 13 % — SIGNIFICANT CHANGE UP (ref 10.3–14.5)
RBC # FLD: 13 % — SIGNIFICANT CHANGE UP (ref 10.3–14.5)
SODIUM SERPL-SCNC: 142 MMOL/L — SIGNIFICANT CHANGE UP (ref 135–145)
SPECIMEN SOURCE: SIGNIFICANT CHANGE UP
WBC # BLD: 5.4 K/UL — SIGNIFICANT CHANGE UP (ref 3.8–10.5)
WBC # BLD: 7.2 K/UL — SIGNIFICANT CHANGE UP (ref 3.8–10.5)
WBC # FLD AUTO: 5.4 K/UL — SIGNIFICANT CHANGE UP (ref 3.8–10.5)
WBC # FLD AUTO: 7.2 K/UL — SIGNIFICANT CHANGE UP (ref 3.8–10.5)

## 2018-10-08 PROCEDURE — 99233 SBSQ HOSP IP/OBS HIGH 50: CPT | Mod: GC

## 2018-10-08 PROCEDURE — 74177 CT ABD & PELVIS W/CONTRAST: CPT | Mod: 26

## 2018-10-08 RX ORDER — ONDANSETRON 8 MG/1
4 TABLET, FILM COATED ORAL ONCE
Qty: 0 | Refills: 0 | Status: DISCONTINUED | OUTPATIENT
Start: 2018-10-08 | End: 2018-10-08

## 2018-10-08 RX ORDER — POTASSIUM PHOSPHATE, MONOBASIC POTASSIUM PHOSPHATE, DIBASIC 236; 224 MG/ML; MG/ML
15 INJECTION, SOLUTION INTRAVENOUS ONCE
Qty: 0 | Refills: 0 | Status: COMPLETED | OUTPATIENT
Start: 2018-10-08 | End: 2018-10-08

## 2018-10-08 RX ORDER — DIPHENHYDRAMINE HCL 50 MG
50 CAPSULE ORAL ONCE
Qty: 0 | Refills: 0 | Status: DISCONTINUED | OUTPATIENT
Start: 2018-10-08 | End: 2018-10-08

## 2018-10-08 RX ORDER — ONDANSETRON 8 MG/1
4 TABLET, FILM COATED ORAL ONCE
Qty: 0 | Refills: 0 | Status: COMPLETED | OUTPATIENT
Start: 2018-10-08 | End: 2018-10-08

## 2018-10-08 RX ORDER — HYDRALAZINE HCL 50 MG
10 TABLET ORAL ONCE
Qty: 0 | Refills: 0 | Status: COMPLETED | OUTPATIENT
Start: 2018-10-08 | End: 2018-10-08

## 2018-10-08 RX ORDER — DIPHENHYDRAMINE HCL 50 MG
50 CAPSULE ORAL ONCE
Qty: 0 | Refills: 0 | Status: COMPLETED | OUTPATIENT
Start: 2018-10-08 | End: 2018-10-08

## 2018-10-08 RX ADMIN — Medication 25 MILLIGRAM(S): at 17:19

## 2018-10-08 RX ADMIN — Medication 50 MILLIGRAM(S): at 20:27

## 2018-10-08 RX ADMIN — OXYCODONE HYDROCHLORIDE 5 MILLIGRAM(S): 5 TABLET ORAL at 08:49

## 2018-10-08 RX ADMIN — ONDANSETRON 4 MILLIGRAM(S): 8 TABLET, FILM COATED ORAL at 20:27

## 2018-10-08 RX ADMIN — LEVETIRACETAM 500 MILLIGRAM(S): 250 TABLET, FILM COATED ORAL at 05:19

## 2018-10-08 RX ADMIN — Medication 3: at 09:32

## 2018-10-08 RX ADMIN — SIMVASTATIN 20 MILLIGRAM(S): 20 TABLET, FILM COATED ORAL at 22:32

## 2018-10-08 RX ADMIN — PANTOPRAZOLE SODIUM 40 MILLIGRAM(S): 20 TABLET, DELAYED RELEASE ORAL at 05:19

## 2018-10-08 RX ADMIN — INSULIN GLARGINE 5 UNIT(S): 100 INJECTION, SOLUTION SUBCUTANEOUS at 22:30

## 2018-10-08 RX ADMIN — Medication 1000 UNIT(S): at 12:20

## 2018-10-08 RX ADMIN — Medication 25 MILLIGRAM(S): at 05:19

## 2018-10-08 RX ADMIN — Medication 1: at 22:20

## 2018-10-08 RX ADMIN — LEVETIRACETAM 500 MILLIGRAM(S): 250 TABLET, FILM COATED ORAL at 17:19

## 2018-10-08 RX ADMIN — Medication 10 MILLIGRAM(S): at 22:00

## 2018-10-08 RX ADMIN — Medication 1 MILLIGRAM(S): at 12:20

## 2018-10-08 RX ADMIN — AMLODIPINE BESYLATE 10 MILLIGRAM(S): 2.5 TABLET ORAL at 05:19

## 2018-10-08 RX ADMIN — Medication 40 MILLIGRAM(S): at 17:19

## 2018-10-08 RX ADMIN — POTASSIUM PHOSPHATE, MONOBASIC POTASSIUM PHOSPHATE, DIBASIC 62.5 MILLIMOLE(S): 236; 224 INJECTION, SOLUTION INTRAVENOUS at 08:49

## 2018-10-08 RX ADMIN — Medication 4: at 13:26

## 2018-10-08 NOTE — PROGRESS NOTE ADULT - SUBJECTIVE AND OBJECTIVE BOX
Plastic Surgery Consult Note  (pg [facility]: [id number], NS: [phone])    HPI:  89 yo F w/ hx of DM2, HTN, OA, spinal stenosis, CKD III, chronic anemia, hyperthyroidism presents after a fall. Patient reports she was at home, got up to use the bathroom and lost balance. She fell forwarded, landed on right knee and right outstretched hand. Patient reports also head injury. She did not lose consciousness. She did not have any prodrome symptoms or chest pain. She denies urinary/fecal incontinence or post-ictal state after the fall. The fall was witnessed by her daughter who helped her get up. The patient was able to bear weight and ambulate without any hip or groin pain. She was driven by her daughter to [facility]. Head imaging there was found to have concern for brain mass and patient was transferred to our hospital. Patient currently has no symptoms. (07 Oct 2018 04:18)      PAST MEDICAL & SURGICAL HISTORY:  Cervical spinal stenosis  Anemia  DUB (dysfunctional uterine bleeding): hysterectomy age 31&#x27;s  Cataract  Spinal stenosis  Obesity  HTN (hypertension)  Anxiety  Dyslipidemia  Spinal stenosis, lumbar  Arthritis  Diabetes mellitus: Type 2 IDDM  L3- L5 laminectomy with fusion: 2013  History of colon polyps removal: colonoscopy   History of total knee replacement: right   left   Hx of cataract surgery: bilateral   H/O mastoidectomy: Right ear  History of cholecystectomy: 1960&#x27;s  History of appendectomy: 1960&#x27;s  History of hysterectomy: age: 31&#x27;s      Allergies    aspirin (Stomach Upset)  codeine (Rash)  iodine (Swelling)  IV Contrast (Short breath)  penicillin (Swelling)  vancomycin (Hives)    Intolerances        Home Medications:  amLODIPine 10 mg oral tablet: 1 tab(s) orally once a day (07 Oct 2018 08:39)  benazepril 40 mg oral tablet: 1 tab(s) orally once a day (07 Oct 2018 08:39)  folic acid 1 mg oral tablet: 1 tab(s) orally once a day (07 Oct 2018 08:39)  hydrALAZINE 25 mg oral tablet: 1 tab(s) orally 2 times a day (07 Oct 2018 08:39)  lansoprazole 30 mg oral delayed release capsule: 1 cap(s) orally once a day (07 Oct 2018 08:39)  methIMAzole 5 mg oral tablet: 1 tab(s) orally once a day (07 Oct 2018 08:39)  NovoLOG FlexPen 100 units/mL injectable solution: 6 unit(s) injectable 3 times a day (07 Oct 2018 08:39)  simvastatin 20 mg oral tablet: 1 tab(s) orally once a day (at bedtime) (07 Oct 2018 08:39)  Tresiba FlexTouch: 30 unit(s) subcutaneous once a day (at bedtime) (07 Oct 2018 08:39)      MEDICATIONS  (STANDING):  amLODIPine   Tablet 10 milliGRAM(s) Oral daily  cholecalciferol 1000 Unit(s) Oral daily  dextrose 50% Injectable 12.5 Gram(s) IV Push once  dextrose 50% Injectable 25 Gram(s) IV Push once  dextrose 50% Injectable 25 Gram(s) IV Push once  folic acid 1 milliGRAM(s) Oral daily  hydrALAZINE 25 milliGRAM(s) Oral every 12 hours  insulin glargine Injectable (LANTUS) 5 Unit(s) SubCutaneous at bedtime  insulin lispro (HumaLOG) corrective regimen sliding scale   SubCutaneous three times a day before meals  insulin lispro (HumaLOG) corrective regimen sliding scale   SubCutaneous at bedtime  levETIRAcetam 500 milliGRAM(s) Oral two times a day  methimazole 5 milliGRAM(s) Oral daily  pantoprazole    Tablet 40 milliGRAM(s) Oral before breakfast  simvastatin 20 milliGRAM(s) Oral at bedtime      SOCIAL HISTORY:    FAMILY HISTORY:  No pertinent family history in first degree relatives      ___________________________________________  REVIEW OF SYSTEMS:    Constitutional: No fevers, chills, no recent weight loss  ENMT: No changes in hearing, no changes in vision, no sore throat, no cough  Respiratory: No shortness of breath  Cardiovascular: No chest pain, palpitations  Gastrointestinal: No abdominal pain, no diarrhea/constipation  Genitourinary: No dysuria, frequency, or urgency    Extremities: No joint swelling, no limited range of movement  Neurological: No paresthesia  Skin: No rashes    ___________________________________________  OBJECTIVE:  Vital Signs Last 24 Hrs  T(C): 37 (08 Oct 2018 04:44), Max: 37 (08 Oct 2018 04:44)  T(F): 98.6 (08 Oct 2018 04:44), Max: 98.6 (08 Oct 2018 04:44)  HR: 71 (08 Oct 2018 04:44) (71 - 76)  BP: 132/57 (08 Oct 2018 04:44) (132/57 - 138/68)  BP(mean): --  RR: 18 (08 Oct 2018 04:44) (16 - 96)  SpO2: 94% (08 Oct 2018 04:44) (94% - 97%)CAPILLARY BLOOD GLUCOSE      POCT Blood Glucose.: 268 mg/dL (08 Oct 2018 09:17)    I&O's Detail    07 Oct 2018 07:01  -  08 Oct 2018 07:00  --------------------------------------------------------  IN:    Oral Fluid: 240 mL  Total IN: 240 mL    OUT:  Total OUT: 0 mL    Total NET: 240 mL      08 Oct 2018 07:01  -  08 Oct 2018 10:21  --------------------------------------------------------  IN:    Oral Fluid: 240 mL  Total IN: 240 mL    OUT:  Total OUT: 0 mL    Total NET: 240 mL          PHYSICAL EXAM:    General: Alert, NAD  Neuro: CN II-XII intact, motor and sensory grossly intact with no focal deficits.  HEENT: Normocephalic, atraumatic, EOMI  Respiratory: Breathing non-labored, airway patent  Extremities:  MSK: Intact ROM.  ____________________________________________  LABS:  CBC Full  -  ( 08 Oct 2018 06:49 )  WBC Count : 5.4 K/uL  Hemoglobin : 8.7 g/dL  Hematocrit : 27.5 %  Platelet Count - Automated : 270 K/uL  Mean Cell Volume : 81.8 fl  Mean Cell Hemoglobin : 25.9 pg  Mean Cell Hemoglobin Concentration : 31.6 gm/dL  Auto Neutrophil # : 3.0 K/uL  Auto Lymphocyte # : 1.5 K/uL  Auto Monocyte # : 0.6 K/uL  Auto Eosinophil # : 0.2 K/uL  Auto Basophil # : 0.0 K/uL  Auto Neutrophil % : 55.9 %  Auto Lymphocyte % : 28.4 %  Auto Monocyte % : 11.5 %  Auto Eosinophil % : 4.0 %  Auto Basophil % : 0.2 %    10-08    142  |  109<H>  |  13  ----------------------------<  187<H>  3.6   |  23  |  0.98    Ca    8.5      08 Oct 2018 06:47  Phos  1.9     10-08  Mg     1.7     10-08    TPro  6.2  /  Alb  3.4  /  TBili  0.3  /  DBili  x   /  AST  16  /  ALT  8<L>  /  AlkPhos  65  10    LIVER FUNCTIONS - ( 08 Oct 2018 06:47 )  Alb: 3.4 g/dL / Pro: 6.2 g/dL / ALK PHOS: 65 U/L / ALT: 8 U/L / AST: 16 U/L / GGT: x           PT/INR - ( 07 Oct 2018 00:22 )   PT: 11.4 sec;   INR: 1.04 ratio         PTT - ( 07 Oct 2018 00:22 )  PTT:36.8 sec  Urinalysis Basic - ( 07 Oct 2018 02:03 )    Color: Yellow / Appearance: Slightly Turbid / S.024 / pH: x  Gluc: x / Ketone: Small  / Bili: Negative / Urobili: Negative   Blood: x / Protein: 100 mg/dL / Nitrite: Negative   Leuk Esterase: Large / RBC: 3 /hpf / WBC 15 /hpf   Sq Epi: x / Non Sq Epi: 20 /hpf / Bacteria: Moderate            ____________________________________________  MICRO:  Recent Cultures:    ____________________________________________  RADIOLOGY:    < from: Xray Finger, Right Hand (10.07.18 @ 03:19) >    EXAM:  FINGER(S) RIGHT HAND                            PROCEDURE DATE:  10/07/2018            INTERPRETATION:  CLINICAL INFORMATION: Right middle finger pain for one   day.    EXAM: 3 views of the right third digit.    COMPARISON: No similar prior studies available for comparison    FINDINGS:  Comminuted fracture of the third distal phalanx. There is mild soft   tissue swelling.    IMPRESSION:  Comminuted fracture of the third distal phalanx.                LA ALLEN M.D., RADIOLOGY RESIDENT  This document has been electronically signed.  GENIE FRANCO M.D., ATTENDING RADIOLOGIST  This document has been electronically signed. Oct  7 2018  7:50AM    < end of copied text >  < from: Xray Knee 4 Views, Right (10.07.18 @ 03:23) >  EXAM:  KNEE COMPLETE RIGHT (4 VIEWS)                            PROCEDURE DATE:  10/07/2018            INTERPRETATION:  CLINICAL INFORMATION: Status post fall. Right knee pain.    EXAM: 4 views of the right knee.    COMPARISON: No similar prior studies available for comparison.    FINDINGS:  Status post right total knee arthroplasty. There is prepatellar soft   tissue swelling without evidence of underlying fracture. Suggestion of   lucency at the cement bone interface of the tibial hardware. Moderate   soft tissue swelling about the distal femur. Proximal and distal patellar   pole enthesophytes.    IMPRESSION:  Prepatellar soft tissue tissue swelling without underlying fracture.  Mild lucency at the cement bone interface of the tibial hardware some of   which may be related to technique.                  LA ALLEN M.D., RADIOLOGY RESIDENT  This document has been electronically signed.  GENIE FRANCO M.D., ATTENDING RADIOLOGIST  This document has been electronically signed. Oct  7 2018  8:16AM    < end of copied text > Plastic Surgery Consult Note  (pg [facility]: [id number], NS: [phone])    HPI:  88 yo F w/ hx of DM2, HTN, OA, spinal stenosis, CKD III, chronic anemia, hyperthyroidism presents after a fall. Plastic surgery consulted for distal phalynx and nasal fractures.      PAST MEDICAL & SURGICAL HISTORY:  Cervical spinal stenosis  Anemia  DUB (dysfunctional uterine bleeding): hysterectomy age 31&#x27;s  Cataract  Spinal stenosis  Obesity  HTN (hypertension)  Anxiety  Dyslipidemia  Spinal stenosis, lumbar  Arthritis  Diabetes mellitus: Type 2 IDDM  L3- L5 laminectomy with fusion: 2013  History of colon polyps removal: colonoscopy   History of total knee replacement: right   left   Hx of cataract surgery: bilateral   H/O mastoidectomy: Right ear  History of cholecystectomy: 1960&#x27;s  History of appendectomy: 1960&#x27;s  History of hysterectomy: age: 31&#x27;s      Allergies    aspirin (Stomach Upset)  codeine (Rash)  iodine (Swelling)  IV Contrast (Short breath)  penicillin (Swelling)  vancomycin (Hives)    Intolerances        Home Medications:  amLODIPine 10 mg oral tablet: 1 tab(s) orally once a day (07 Oct 2018 08:39)  benazepril 40 mg oral tablet: 1 tab(s) orally once a day (07 Oct 2018 08:39)  folic acid 1 mg oral tablet: 1 tab(s) orally once a day (07 Oct 2018 08:39)  hydrALAZINE 25 mg oral tablet: 1 tab(s) orally 2 times a day (07 Oct 2018 08:39)  lansoprazole 30 mg oral delayed release capsule: 1 cap(s) orally once a day (07 Oct 2018 08:39)  methIMAzole 5 mg oral tablet: 1 tab(s) orally once a day (07 Oct 2018 08:39)  NovoLOG FlexPen 100 units/mL injectable solution: 6 unit(s) injectable 3 times a day (07 Oct 2018 08:39)  simvastatin 20 mg oral tablet: 1 tab(s) orally once a day (at bedtime) (07 Oct 2018 08:39)  Tresiba FlexTouch: 30 unit(s) subcutaneous once a day (at bedtime) (07 Oct 2018 08:39)      MEDICATIONS  (STANDING):  amLODIPine   Tablet 10 milliGRAM(s) Oral daily  cholecalciferol 1000 Unit(s) Oral daily  dextrose 50% Injectable 12.5 Gram(s) IV Push once  dextrose 50% Injectable 25 Gram(s) IV Push once  dextrose 50% Injectable 25 Gram(s) IV Push once  folic acid 1 milliGRAM(s) Oral daily  hydrALAZINE 25 milliGRAM(s) Oral every 12 hours  insulin glargine Injectable (LANTUS) 5 Unit(s) SubCutaneous at bedtime  insulin lispro (HumaLOG) corrective regimen sliding scale   SubCutaneous three times a day before meals  insulin lispro (HumaLOG) corrective regimen sliding scale   SubCutaneous at bedtime  levETIRAcetam 500 milliGRAM(s) Oral two times a day  methimazole 5 milliGRAM(s) Oral daily  pantoprazole    Tablet 40 milliGRAM(s) Oral before breakfast  simvastatin 20 milliGRAM(s) Oral at bedtime      SOCIAL HISTORY:    FAMILY HISTORY:  No pertinent family history in first degree relatives      ___________________________________________  REVIEW OF SYSTEMS:    Constitutional: No fevers, chills, no recent weight loss  ENMT: No changes in hearing, no changes in vision, no sore throat, no cough  Respiratory: No shortness of breath  Cardiovascular: No chest pain, palpitations  Gastrointestinal: No abdominal pain, no diarrhea/constipation  Genitourinary: No dysuria, frequency, or urgency    Extremities: No joint swelling, no limited range of movement  Neurological: No paresthesia  Skin: No rashes    ___________________________________________  OBJECTIVE:  Vital Signs Last 24 Hrs  T(C): 37 (08 Oct 2018 04:44), Max: 37 (08 Oct 2018 04:44)  T(F): 98.6 (08 Oct 2018 04:44), Max: 98.6 (08 Oct 2018 04:44)  HR: 71 (08 Oct 2018 04:44) (71 - 76)  BP: 132/57 (08 Oct 2018 04:44) (132/57 - 138/68)  BP(mean): --  RR: 18 (08 Oct 2018 04:44) (16 - 96)  SpO2: 94% (08 Oct 2018 04:44) (94% - 97%)CAPILLARY BLOOD GLUCOSE      POCT Blood Glucose.: 268 mg/dL (08 Oct 2018 09:17)    I&O's Detail    07 Oct 2018 07:  -  08 Oct 2018 07:00  --------------------------------------------------------  IN:    Oral Fluid: 240 mL  Total IN: 240 mL    OUT:  Total OUT: 0 mL    Total NET: 240 mL      08 Oct 2018 07:  -  08 Oct 2018 10:21  --------------------------------------------------------  IN:    Oral Fluid: 240 mL  Total IN: 240 mL    OUT:  Total OUT: 0 mL    Total NET: 240 mL          PHYSICAL EXAM:    General: Alert, NAD  Neuro: CN II-XII intact, motor and sensory grossly intact with no focal deficits.  Respiratory: Breathing non-labored, airway patent  Extremities: Swollen third distal phalynx, ROM limited by pain, no sensory deficits   ____________________________________________  LABS:  CBC Full  -  ( 08 Oct 2018 06:49 )  WBC Count : 5.4 K/uL  Hemoglobin : 8.7 g/dL  Hematocrit : 27.5 %  Platelet Count - Automated : 270 K/uL  Mean Cell Volume : 81.8 fl  Mean Cell Hemoglobin : 25.9 pg  Mean Cell Hemoglobin Concentration : 31.6 gm/dL  Auto Neutrophil # : 3.0 K/uL  Auto Lymphocyte # : 1.5 K/uL  Auto Monocyte # : 0.6 K/uL  Auto Eosinophil # : 0.2 K/uL  Auto Basophil # : 0.0 K/uL  Auto Neutrophil % : 55.9 %  Auto Lymphocyte % : 28.4 %  Auto Monocyte % : 11.5 %  Auto Eosinophil % : 4.0 %  Auto Basophil % : 0.2 %    10-08    142  |  109<H>  |  13  ----------------------------<  187<H>  3.6   |  23  |  0.98    Ca    8.5      08 Oct 2018 06:47  Phos  1.9     10-08  Mg     1.7     10-08    TPro  6.2  /  Alb  3.4  /  TBili  0.3  /  DBili  x   /  AST  16  /  ALT  8<L>  /  AlkPhos  65  10-08    LIVER FUNCTIONS - ( 08 Oct 2018 06:47 )  Alb: 3.4 g/dL / Pro: 6.2 g/dL / ALK PHOS: 65 U/L / ALT: 8 U/L / AST: 16 U/L / GGT: x           PT/INR - ( 07 Oct 2018 00:22 )   PT: 11.4 sec;   INR: 1.04 ratio         PTT - ( 07 Oct 2018 00:22 )  PTT:36.8 sec  Urinalysis Basic - ( 07 Oct 2018 02:03 )    Color: Yellow / Appearance: Slightly Turbid / S.024 / pH: x  Gluc: x / Ketone: Small  / Bili: Negative / Urobili: Negative   Blood: x / Protein: 100 mg/dL / Nitrite: Negative   Leuk Esterase: Large / RBC: 3 /hpf / WBC 15 /hpf   Sq Epi: x / Non Sq Epi: 20 /hpf / Bacteria: Moderate            ____________________________________________  MICRO:  Recent Cultures:    ____________________________________________  RADIOLOGY:    < from: Xray Finger, Right Hand (10.07.18 @ 03:19) >    EXAM:  FINGER(S) RIGHT HAND                            PROCEDURE DATE:  10/07/2018            INTERPRETATION:  CLINICAL INFORMATION: Right middle finger pain for one   day.    EXAM: 3 views of the right third digit.    COMPARISON: No similar prior studies available for comparison    FINDINGS:  Comminuted fracture of the third distal phalanx. There is mild soft   tissue swelling.    IMPRESSION:  Comminuted fracture of the third distal phalanx.                LA ALLEN M.D., RADIOLOGY RESIDENT  This document has been electronically signed.  GENIE FRANCO M.D., ATTENDING RADIOLOGIST  This document has been electronically signed. Oct  7 2018  7:50AM    < end of copied text >  < from: Xray Knee 4 Views, Right (10.07.18 @ 03:23) >  EXAM:  KNEE COMPLETE RIGHT (4 VIEWS)                            PROCEDURE DATE:  10/07/2018            INTERPRETATION:  CLINICAL INFORMATION: Status post fall. Right knee pain.    EXAM: 4 views of the right knee.    COMPARISON: No similar prior studies available for comparison.    FINDINGS:  Status post right total knee arthroplasty. There is prepatellar soft   tissue swelling without evidence of underlying fracture. Suggestion of   lucency at the cement bone interface of the tibial hardware. Moderate   soft tissue swelling about the distal femur. Proximal and distal patellar   pole enthesophytes.    IMPRESSION:  Prepatellar soft tissue tissue swelling without underlying fracture.  Mild lucency at the cement bone interface of the tibial hardware some of   which may be related to technique.                  AL ALLEN M.D., RADIOLOGY RESIDENT  This document has been electronically signed.  GENIE FRANCO M.D., ATTENDING RADIOLOGIST  This document has been electronically signed. Oct  7 2018  8:16AM      < from: CT Maxillofacial No Cont (10.06.18 @ 22:28) >    EXAM:  CT MAXILLOFACIAL                            PROCEDURE DATE:  10/06/2018          INTERPRETATION:  10/6/2018 11:03 PM    CLINICAL HISTORY: Fall, pain.    TECHNIQUE: Thin section axial CT images are obtained through the   maxillofacial bones and mandible with reformatted images in sagittal and   coronal plane. Intravenous contrast was not administered.    COMPARISON: None    FINDINGS:    Age-indeterminate nondisplaced fracture through the right aspect of the   nasal bone. Otherwise no facial bone fracture is seen.    The paranasal sinuses are aerated. The globes and retrobulbar structures   are symmetric.    IMPRESSION:    Age indeterminant nondisplaced fracture of the right aspect of the nasal   bone. Correlate with site of pain                CELIA PHILIPPE M.D., ATTENDING RADIOLOGIST  This document has been electronically signed. Oct  6 2018 11:07PM    < end of copied text >

## 2018-10-08 NOTE — PROVIDER CONTACT NOTE (OTHER) - SITUATION
Pt's FS = 151. She is NPO for a CT scan at 2130 tonight. She is due for 1 unit Pt's FS = 151. She is NPO for a CT scan at 2130 tonight. She is due for 1 unit of insulin.

## 2018-10-08 NOTE — PROVIDER CONTACT NOTE (OTHER) - SITUATION
Pt is ordered for PRN solumedrol and benadryl incase she has a reaction to CT contrast. The CT department called and said she should get the medication before and not wait for a PRN indication.

## 2018-10-08 NOTE — PROGRESS NOTE ADULT - ASSESSMENT
88 yo F w/ hx of DM2, HTN, OA, spinal stenosis, CKD III, chronic anemia, hyperthyroidism presents after a fall with radiologic evidence of thrid distal phalynx fracture and age indeterminant nondisplaced fracture of R aspect of nasal bone  - No immediate operative intervention indicated at this time  - May splint or tape third digit for patient comfort as needed  - Please follow up with Dr. Alfaro after discharge from the hospital. May call [phone] to schedule an appointment.

## 2018-10-08 NOTE — PROGRESS NOTE ADULT - SUBJECTIVE AND OBJECTIVE BOX
Patient seen and examined at bedside.    T(C): 37 (10-08-18 @ 04:44), Max: 37 (10-08-18 @ 04:44)  HR: 71 (10-08-18 @ 04:44) (67 - 82)  BP: 132/57 (10-08-18 @ 04:44) (129/73 - 148/80)  RR: 18 (10-08-18 @ 04:44) (16 - 96)  SpO2: 94% (10-08-18 @ 04:44) (94% - 100%)  Wt(kg): --    Exam:    AOx3, FC, PERRL, EOMI, V1-3 intact, no facial, palate danette symmetric, tongue midline, shrug 5/5  5/5 throughout, no drift  SILT  No clonus or babinski    Left forehead abrasion, right knee and finger pain

## 2018-10-08 NOTE — PROGRESS NOTE ADULT - PROBLEM SELECTOR PLAN 1
-Patient presents with new left frontal lobe brain lesions.   -Seen by neurosx who recommends brain MRI w/ and w/o contrast. Pt. has IV contrast allergy but admitting resident spoke w/ patient and family at bedside who agree with brain MRI despite the risk of gadolinium induced fibrosis and renal damage.   -hold steroids for now unless symptomatic  -keppra for seizure ppx as per neurosx recommendations; 500mg BID -Patient presents with new left frontal lobe brain lesions.   - MRI of head showed a 3.3 cm left parasagittal frontal extra-axial mass that is most consistent with a meningioma.   -hold steroids for now unless symptomatic; if signs or symptoms of intrcranial pressure can start pt. on decadron 4mg q6hrs   - pt. scheduled for CT scan of chest, abd/pelvis w/ and w/out contrast today at 9:30pm for metastatic workup.  Allergy to iodinated agents unclear; pre-treating w/ benadryl and solumedrol.     -keppra for seizure ppx as per neurosx recommendations; 500mg BID -Patient presents with new left frontal lobe brain lesions.   - MRI of head showed a 3.3 cm left parasagittal frontal extra-axial mass that is most consistent with a meningioma.   -hold steroids for now unless symptomatic; if signs or symptoms of intrcranial pressure can start pt. on decadron 4mg q6hrs   - pt. scheduled for CT scan of chest, abd/pelvis w/ and w/out contrast today at 9:30pm for metastatic workup.  Allergy to iodinated agents is nausea and vomiting with occurred 40yrs ago per the patient and daughter. pre-treating w/ zofran.     -keppra for seizure ppx as per neurosx recommendations; 500mg BID

## 2018-10-08 NOTE — PROVIDER CONTACT NOTE (OTHER) - ACTION/TREATMENT ORDERED:
MD aware. No new action. Continue to monitor. MD aware. No new action. Continue to monitor.  f/u @ 1905: UO=631/67, HR 78.  Pt is nervous for pending CT scan at 2130. She is due for benadryl at 2030.  No new action per Dr. Whitman.

## 2018-10-08 NOTE — PROGRESS NOTE ADULT - PROBLEM SELECTOR PLAN 4
- Normocytic anemia w/ dropping hemoglobin over 3 days (10.5, 9.4, 8.7) post fall.  May be secondary to a hematoma  - Ordered iron studies to r/o iron def. anemia  -  reticulocyte count

## 2018-10-08 NOTE — PROGRESS NOTE ADULT - ASSESSMENT
9F s/p fall possible syncopal episode found to have possible brain mass on CTH     Plan:  - Medicine eval for metastatic work up  - CT CAP  - MRI brain  w w/o  - Keppra 500BID for sz ppx  - At this time given lack of clear symptoms 2/2 mass and edema and morbidity of steroids would not recommend steroids, however if symptoms develop would recommned dec 4mg q6hrs 9F s/p fall possible syncopal episode found to have possible brain mass on CTH     Plan:  - Medicine eval for metastatic work up  - CT CAP  - Will f/u final MRI brain read  - Keppra 500BID for sz ppx  - At this time given lack of clear symptoms 2/2 mass and edema and morbidity of steroids would not recommend steroids, however if symptoms develop would recommned dec 4mg q6hrs

## 2018-10-08 NOTE — PROGRESS NOTE ADULT - PROBLEM SELECTOR PLAN 7
-Pt with nondisplaced fracture of the right aspect of the nasal   bone  -D/w hand sx pending full eval

## 2018-10-08 NOTE — PROGRESS NOTE ADULT - SUBJECTIVE AND OBJECTIVE BOX
Meg Hutson MD  PGY1 | Dept of Internal Medicine  Pager [phone]        Patient is a 89y old  Female who presents with a chief complaint of fall (08 Oct 2018 05:28)      SUBJECTIVE / OVERNIGHT EVENTS:    MEDICATIONS  (STANDING):  amLODIPine   Tablet 10 milliGRAM(s) Oral daily  cholecalciferol 1000 Unit(s) Oral daily  dextrose 50% Injectable 12.5 Gram(s) IV Push once  dextrose 50% Injectable 25 Gram(s) IV Push once  dextrose 50% Injectable 25 Gram(s) IV Push once  folic acid 1 milliGRAM(s) Oral daily  hydrALAZINE 25 milliGRAM(s) Oral every 12 hours  insulin glargine Injectable (LANTUS) 5 Unit(s) SubCutaneous at bedtime  insulin lispro (HumaLOG) corrective regimen sliding scale   SubCutaneous three times a day before meals  insulin lispro (HumaLOG) corrective regimen sliding scale   SubCutaneous at bedtime  levETIRAcetam 500 milliGRAM(s) Oral two times a day  methimazole 5 milliGRAM(s) Oral daily  pantoprazole    Tablet 40 milliGRAM(s) Oral before breakfast  simvastatin 20 milliGRAM(s) Oral at bedtime    MEDICATIONS  (PRN):  acetaminophen   Tablet .. 650 milliGRAM(s) Oral every 6 hours PRN Moderate Pain (4 - 6), Severe Pain (7 - 10)  dextrose 40% Gel 15 Gram(s) Oral once PRN Blood Glucose LESS THAN 70 milliGRAM(s)/deciliter  glucagon  Injectable 1 milliGRAM(s) IntraMuscular once PRN Glucose LESS THAN 70 milligrams/deciliter  oxyCODONE    IR 5 milliGRAM(s) Oral every 6 hours PRN Severe Pain (7 - 10)      Vital Signs Last 24 Hrs  T(C): 37 (08 Oct 2018 04:44), Max: 37 (08 Oct 2018 04:44)  T(F): 98.6 (08 Oct 2018 04:44), Max: 98.6 (08 Oct 2018 04:44)  HR: 71 (08 Oct 2018 04:44) (71 - 82)  BP: 132/57 (08 Oct 2018 04:44) (132/57 - 148/80)  BP(mean): --  RR: 18 (08 Oct 2018 04:44) (16 - 96)  SpO2: 94% (08 Oct 2018 04:44) (94% - 97%)    CAPILLARY BLOOD GLUCOSE      POCT Blood Glucose.: 226 mg/dL (07 Oct 2018 21:49)  POCT Blood Glucose.: 296 mg/dL (07 Oct 2018 17:26)  POCT Blood Glucose.: 247 mg/dL (07 Oct 2018 12:46)  POCT Blood Glucose.: 253 mg/dL (07 Oct 2018 08:53)    I&O's Summary    07 Oct 2018 07:01  -  08 Oct 2018 07:00  --------------------------------------------------------  IN: 240 mL / OUT: 0 mL / NET: 240 mL        PHYSICAL EXAM:  GENERAL: NAD, well-developed  HEAD:  Atraumatic, Normocephalic  EYES: EOMI, PERRLA, conjunctiva and sclera clear  NECK: Supple, No JVD  CHEST/LUNG: Clear to auscultation bilaterally; No wheeze  HEART: Regular rate and rhythm; No murmurs, rubs, or gallops  ABDOMEN: Soft, Nontender, Nondistended; Bowel sounds present  EXTREMITIES:  2+ Peripheral Pulses, No clubbing, cyanosis, or edema  PSYCH: AAOx3  NEUROLOGY: non-focal  SKIN: No rashes or lesions    LABS:                        8.7    5.4   )-----------( 270      ( 08 Oct 2018 06:49 )             27.5     Auto Eosinophil # 0.2   / Auto Eosinophil % 4.0   / Auto Neutrophil # 3.0   / Auto Neutrophil % 55.9  / BANDS % x                            9.4    6.80  )-----------( 293      ( 07 Oct 2018 10:15 )             29.8     Auto Eosinophil # x     / Auto Eosinophil % x     / Auto Neutrophil # x     / Auto Neutrophil % x     / BANDS % x                            10.5   11.9  )-----------( 296      ( 07 Oct 2018 00:22 )             32.8     Auto Eosinophil # x     / Auto Eosinophil % x     / Auto Neutrophil # x     / Auto Neutrophil % x     / BANDS % x        10-07    143  |  108  |  16  ----------------------------<  220<H>  3.7   |  24  |  0.96  10-07    143  |  110<H>  |  14  ----------------------------<  123<H>  3.5   |  23  |  0.92    Ca    8.6      07 Oct 2018 07:41    PT/INR - ( 07 Oct 2018 00:22 )   PT: 11.4 sec;   INR: 1.04 ratio         PTT - ( 07 Oct 2018 00:22 )  PTT:36.8 sec      Urinalysis Basic - ( 07 Oct 2018 02:03 )    Color: Yellow / Appearance: Slightly Turbid / S.024 / pH: x  Gluc: x / Ketone: Small  / Bili: Negative / Urobili: Negative   Blood: x / Protein: 100 mg/dL / Nitrite: Negative   Leuk Esterase: Large / RBC: 3 /hpf / WBC 15 /hpf   Sq Epi: x / Non Sq Epi: 20 /hpf / Bacteria: Moderate Meg Hutson MD  PGY1 | Dept of Internal Medicine  Pager [phone]        Patient is a 89y old  Female who presents with a chief complaint of fall (08 Oct 2018 05:28)      SUBJECTIVE / OVERNIGHT EVENTS: No acute events o/n.  Pt. reports pain secondary to fall.  Her R knee is especially painful and swollen.  Pt does not endorse headaches, vision changes, nausea or vomiting.  ROS neg. for SOB, CP, abdominal pain, or dysuria.      MEDICATIONS  (STANDING):  amLODIPine   Tablet 10 milliGRAM(s) Oral daily  cholecalciferol 1000 Unit(s) Oral daily  dextrose 50% Injectable 12.5 Gram(s) IV Push once  dextrose 50% Injectable 25 Gram(s) IV Push once  dextrose 50% Injectable 25 Gram(s) IV Push once  folic acid 1 milliGRAM(s) Oral daily  hydrALAZINE 25 milliGRAM(s) Oral every 12 hours  insulin glargine Injectable (LANTUS) 5 Unit(s) SubCutaneous at bedtime  insulin lispro (HumaLOG) corrective regimen sliding scale   SubCutaneous three times a day before meals  insulin lispro (HumaLOG) corrective regimen sliding scale   SubCutaneous at bedtime  levETIRAcetam 500 milliGRAM(s) Oral two times a day  methimazole 5 milliGRAM(s) Oral daily  pantoprazole    Tablet 40 milliGRAM(s) Oral before breakfast  simvastatin 20 milliGRAM(s) Oral at bedtime    MEDICATIONS  (PRN):  acetaminophen   Tablet .. 650 milliGRAM(s) Oral every 6 hours PRN Moderate Pain (4 - 6), Severe Pain (7 - 10)  dextrose 40% Gel 15 Gram(s) Oral once PRN Blood Glucose LESS THAN 70 milliGRAM(s)/deciliter  glucagon  Injectable 1 milliGRAM(s) IntraMuscular once PRN Glucose LESS THAN 70 milligrams/deciliter  oxyCODONE    IR 5 milliGRAM(s) Oral every 6 hours PRN Severe Pain (7 - 10)      Vital Signs Last 24 Hrs  T(C): 37 (08 Oct 2018 04:44), Max: 37 (08 Oct 2018 04:44)  T(F): 98.6 (08 Oct 2018 04:44), Max: 98.6 (08 Oct 2018 04:44)  HR: 71 (08 Oct 2018 04:44) (71 - 82)  BP: 132/57 (08 Oct 2018 04:44) (132/57 - 148/80)  BP(mean): --  RR: 18 (08 Oct 2018 04:44) (16 - 96)  SpO2: 94% (08 Oct 2018 04:44) (94% - 97%)    CAPILLARY BLOOD GLUCOSE      POCT Blood Glucose.: 226 mg/dL (07 Oct 2018 21:49)  POCT Blood Glucose.: 296 mg/dL (07 Oct 2018 17:26)  POCT Blood Glucose.: 247 mg/dL (07 Oct 2018 12:46)  POCT Blood Glucose.: 253 mg/dL (07 Oct 2018 08:53)    I&O's Summary    07 Oct 2018 07:01  -  08 Oct 2018 07:00  --------------------------------------------------------  IN: 240 mL / OUT: 0 mL / NET: 240 mL        PHYSICAL EXAM:  GENERAL: NAD, well-developed  HEAD:  b/l eye contusions, birthmark on left temporal side of face, contusion on forehead   EYES: sclera clear  CHEST/LUNG: Clear to auscultation bilaterally; No wheeze  HEART: Regular rate and rhythm; No murmurs, rubs, or gallops  ABDOMEN: Soft, Nontender, Nondistended; Bowel sounds present  EXTREMITIES:  swollen R knee; b/l knee scars from old knee replacement surgery  PSYCH: AAOx3  NEUROLOGY: non-focal      LABS:                        8.7    5.4   )-----------( 270      ( 08 Oct 2018 06:49 )             27.5     Auto Eosinophil # 0.2   / Auto Eosinophil % 4.0   / Auto Neutrophil # 3.0   / Auto Neutrophil % 55.9  / BANDS % x                            9.4    6.80  )-----------( 293      ( 07 Oct 2018 10:15 )             29.8     Auto Eosinophil # x     / Auto Eosinophil % x     / Auto Neutrophil # x     / Auto Neutrophil % x     / BANDS % x                            10.5   11.9  )-----------( 296      ( 07 Oct 2018 00:22 )             32.8     Auto Eosinophil # x     / Auto Eosinophil % x     / Auto Neutrophil # x     / Auto Neutrophil % x     / BANDS % x        10-07    143  |  108  |  16  ----------------------------<  220<H>  3.7   |  24  |  0.96  10-07    143  |  110<H>  |  14  ----------------------------<  123<H>  3.5   |  23  |  0.92    Ca    8.6      07 Oct 2018 07:41    PT/INR - ( 07 Oct 2018 00:22 )   PT: 11.4 sec;   INR: 1.04 ratio         PTT - ( 07 Oct 2018 00:22 )  PTT:36.8 sec      Urinalysis Basic - ( 07 Oct 2018 02:03 )    Color: Yellow / Appearance: Slightly Turbid / S.024 / pH: x  Gluc: x / Ketone: Small  / Bili: Negative / Urobili: Negative   Blood: x / Protein: 100 mg/dL / Nitrite: Negative   Leuk Esterase: Large / RBC: 3 /hpf / WBC 15 /hpf   Sq Epi: x / Non Sq Epi: 20 /hpf / Bacteria: Moderate

## 2018-10-08 NOTE — PROGRESS NOTE ADULT - ASSESSMENT
88 yo F w/ hx of DM2, HTN, OA, spinal stenosis, CKD III, chronic anemia, hyperthyroidism presents after a fall w/ CT head showing abnormal lesion along the L frontal lobe concerning for malignancy now undergoing metastatic workup.

## 2018-10-09 ENCOUNTER — TRANSCRIPTION ENCOUNTER (OUTPATIENT)
Age: 84
End: 2018-10-09

## 2018-10-09 DIAGNOSIS — Z29.9 ENCOUNTER FOR PROPHYLACTIC MEASURES, UNSPECIFIED: ICD-10-CM

## 2018-10-09 LAB
ANION GAP SERPL CALC-SCNC: 12 MMOL/L — SIGNIFICANT CHANGE UP (ref 5–17)
BUN SERPL-MCNC: 23 MG/DL — SIGNIFICANT CHANGE UP (ref 7–23)
CALCIUM SERPL-MCNC: 8.6 MG/DL — SIGNIFICANT CHANGE UP (ref 8.4–10.5)
CHLORIDE SERPL-SCNC: 104 MMOL/L — SIGNIFICANT CHANGE UP (ref 96–108)
CO2 SERPL-SCNC: 22 MMOL/L — SIGNIFICANT CHANGE UP (ref 22–31)
CREAT SERPL-MCNC: 1.16 MG/DL — SIGNIFICANT CHANGE UP (ref 0.5–1.3)
GLUCOSE BLDC GLUCOMTR-MCNC: 233 MG/DL — HIGH (ref 70–99)
GLUCOSE BLDC GLUCOMTR-MCNC: 242 MG/DL — HIGH (ref 70–99)
GLUCOSE BLDC GLUCOMTR-MCNC: 390 MG/DL — HIGH (ref 70–99)
GLUCOSE BLDC GLUCOMTR-MCNC: 392 MG/DL — HIGH (ref 70–99)
GLUCOSE BLDC GLUCOMTR-MCNC: 401 MG/DL — HIGH (ref 70–99)
GLUCOSE BLDC GLUCOMTR-MCNC: 452 MG/DL — CRITICAL HIGH (ref 70–99)
GLUCOSE BLDC GLUCOMTR-MCNC: 553 MG/DL — CRITICAL HIGH (ref 70–99)
GLUCOSE BLDC GLUCOMTR-MCNC: 564 MG/DL — CRITICAL HIGH (ref 70–99)
GLUCOSE BLDC GLUCOMTR-MCNC: 592 MG/DL — CRITICAL HIGH (ref 70–99)
GLUCOSE BLDC GLUCOMTR-MCNC: 91 MG/DL — SIGNIFICANT CHANGE UP (ref 70–99)
GLUCOSE SERPL-MCNC: 396 MG/DL — HIGH (ref 70–99)
HCT VFR BLD CALC: 27.3 % — LOW (ref 34.5–45)
HGB BLD-MCNC: 8.9 G/DL — LOW (ref 11.5–15.5)
MAGNESIUM SERPL-MCNC: 1.9 MG/DL — SIGNIFICANT CHANGE UP (ref 1.6–2.6)
MCHC RBC-ENTMCNC: 26.8 PG — LOW (ref 27–34)
MCHC RBC-ENTMCNC: 32.7 GM/DL — SIGNIFICANT CHANGE UP (ref 32–36)
MCV RBC AUTO: 82.1 FL — SIGNIFICANT CHANGE UP (ref 80–100)
PHOSPHATE SERPL-MCNC: 3.5 MG/DL — SIGNIFICANT CHANGE UP (ref 2.5–4.5)
PLATELET # BLD AUTO: 270 K/UL — SIGNIFICANT CHANGE UP (ref 150–400)
POTASSIUM SERPL-MCNC: 4.3 MMOL/L — SIGNIFICANT CHANGE UP (ref 3.5–5.3)
POTASSIUM SERPL-SCNC: 4.3 MMOL/L — SIGNIFICANT CHANGE UP (ref 3.5–5.3)
RBC # BLD: 3.32 M/UL — LOW (ref 3.8–5.2)
RBC # FLD: 13.5 % — SIGNIFICANT CHANGE UP (ref 10.3–14.5)
SODIUM SERPL-SCNC: 138 MMOL/L — SIGNIFICANT CHANGE UP (ref 135–145)
TRANSFERRIN SERPL-MCNC: 195 MG/DL — LOW (ref 200–360)
WBC # BLD: 7.4 K/UL — SIGNIFICANT CHANGE UP (ref 3.8–10.5)
WBC # FLD AUTO: 7.4 K/UL — SIGNIFICANT CHANGE UP (ref 3.8–10.5)

## 2018-10-09 PROCEDURE — 99233 SBSQ HOSP IP/OBS HIGH 50: CPT | Mod: GC

## 2018-10-09 RX ORDER — INSULIN LISPRO 100/ML
10 VIAL (ML) SUBCUTANEOUS ONCE
Qty: 0 | Refills: 0 | Status: COMPLETED | OUTPATIENT
Start: 2018-10-09 | End: 2018-10-09

## 2018-10-09 RX ORDER — INSULIN LISPRO 100/ML
5 VIAL (ML) SUBCUTANEOUS ONCE
Qty: 0 | Refills: 0 | Status: COMPLETED | OUTPATIENT
Start: 2018-10-09 | End: 2018-10-09

## 2018-10-09 RX ORDER — INSULIN HUMAN 100 [IU]/ML
10 INJECTION, SOLUTION SUBCUTANEOUS ONCE
Qty: 0 | Refills: 0 | Status: DISCONTINUED | OUTPATIENT
Start: 2018-10-09 | End: 2018-10-09

## 2018-10-09 RX ADMIN — Medication 1000 UNIT(S): at 11:40

## 2018-10-09 RX ADMIN — Medication 25 MILLIGRAM(S): at 05:03

## 2018-10-09 RX ADMIN — INSULIN GLARGINE 5 UNIT(S): 100 INJECTION, SOLUTION SUBCUTANEOUS at 22:07

## 2018-10-09 RX ADMIN — OXYCODONE HYDROCHLORIDE 5 MILLIGRAM(S): 5 TABLET ORAL at 09:45

## 2018-10-09 RX ADMIN — Medication 5: at 08:49

## 2018-10-09 RX ADMIN — Medication 10 UNIT(S): at 11:01

## 2018-10-09 RX ADMIN — SIMVASTATIN 20 MILLIGRAM(S): 20 TABLET, FILM COATED ORAL at 21:38

## 2018-10-09 RX ADMIN — PANTOPRAZOLE SODIUM 40 MILLIGRAM(S): 20 TABLET, DELAYED RELEASE ORAL at 06:36

## 2018-10-09 RX ADMIN — Medication 25 MILLIGRAM(S): at 17:31

## 2018-10-09 RX ADMIN — AMLODIPINE BESYLATE 10 MILLIGRAM(S): 2.5 TABLET ORAL at 05:03

## 2018-10-09 RX ADMIN — Medication 1 MILLIGRAM(S): at 11:40

## 2018-10-09 RX ADMIN — OXYCODONE HYDROCHLORIDE 5 MILLIGRAM(S): 5 TABLET ORAL at 08:57

## 2018-10-09 RX ADMIN — Medication 6: at 12:48

## 2018-10-09 RX ADMIN — Medication 5 UNIT(S): at 14:12

## 2018-10-09 RX ADMIN — LEVETIRACETAM 500 MILLIGRAM(S): 250 TABLET, FILM COATED ORAL at 05:03

## 2018-10-09 NOTE — PROVIDER CONTACT NOTE (OTHER) - RECOMMENDATIONS
Pt needs zofran before scan. As per the CT department, she should get solumedrol at 5:30pm and benadryl at 8:30pm and the scan is at 9:30pm.
Standing dose of 25mg hydralazine was due and given.
10 units of Humalog

## 2018-10-09 NOTE — DISCHARGE NOTE ADULT - PATIENT PORTAL LINK FT
You can access the [facility] Patient Portal, offered by [facility], by registering with the following website: [email or web address]

## 2018-10-09 NOTE — PHYSICAL THERAPY INITIAL EVALUATION ADULT - ADDITIONAL COMMENTS
as per pt: 89 year old female who PTA pt was living in a PH + Stairs and was independent in all functional mobility and ADL's. uses a cane for gait however has a RW. pt states that she was able to get up and ambulate on her own without any assistance.

## 2018-10-09 NOTE — PROGRESS NOTE ADULT - PROBLEM SELECTOR PLAN 1
-Patient presents with new left frontal lobe brain lesions.   - MRI of head showed a 3.3 cm left parasagittal frontal extra-axial mass that is most consistent with a meningioma.   - CT CAP performed yesterday as part of metastatic workup - report pending  -hold steroids for now unless symptomatic; if signs or symptoms of intrcranial pressure can start pt. on decadron 4mg q6hrs   - pt. scheduled for CT scan of chest, abd/pelvis w/ and w/out contrast today at 9:30pm for metastatic workup.  Allergy to iodinated agents is nausea and vomiting with occurred 40yrs ago per the patient and daughter. pre-treating w/ zofran.     -keppra for seizure ppx as per neurosx recommendations; 500mg BID -Patient presents with new left frontal lobe brain lesions.   - MRI of head showed a 3.3 cm left parasagittal frontal extra-axial mass that is most consistent with a meningioma.   - CT CAP performed yesterday as part of metastatic workup - report pending  -hold steroids for now unless symptomatic; if signs or symptoms of intrcranial pressure can start pt. on decadron 4mg q6hrs   -keppra for seizure ppx as per neurosx recommendations; 500mg BID

## 2018-10-09 NOTE — PHYSICAL THERAPY INITIAL EVALUATION ADULT - GENERAL OBSERVATIONS, REHAB EVAL
Pt encountered sitting edge of bed, with daughter, AO x 3. ecchmosis at face, + splint at R digit on finger.

## 2018-10-09 NOTE — PHYSICAL THERAPY INITIAL EVALUATION ADULT - PERTINENT HX OF CURRENT PROBLEM, REHAB EVAL
89 yo F w/ hx of DM2, HTN, OA, spinal stenosis, CKD III, chronic anemia, hyperthyroidism presents after a fall w/ CT head showing abnormal lesion along the L frontal lobe concerning for malignancy now undergoing metastatic workup.  MRI 10/7: +Meningioma

## 2018-10-09 NOTE — DISCHARGE NOTE ADULT - MEDICATION SUMMARY - MEDICATIONS TO TAKE
I will START or STAY ON the medications listed below when I get home from the hospital:    benazepril 40 mg oral tablet  -- 1 tab(s) by mouth once a day  -- Indication: For Hypertension    NovoLOG FlexPen 100 units/mL injectable solution  -- 6 unit(s) injectable 3 times a day  -- Indication: For Diabetes    Tresiba FlexTouch  -- 30 unit(s) subcutaneous once a day (at bedtime)  -- Indication: For Diabetes    simvastatin 20 mg oral tablet  -- 1 tab(s) by mouth once a day (at bedtime)  -- Indication: For Hyperlipidemia     methIMAzole 5 mg oral tablet  -- 1 tab(s) by mouth once a day  -- Indication: For Hyperthyroidism     amLODIPine 10 mg oral tablet  -- 1 tab(s) by mouth once a day  -- Indication: For Hypertension    lansoprazole 30 mg oral delayed release capsule  -- 1 cap(s) by mouth once a day  -- Indication: For Acid reflux     hydrALAZINE 25 mg oral tablet  -- 1 tab(s) by mouth 2 times a day  -- Indication: For Hypertension    folic acid 1 mg oral tablet  -- 1 tab(s) by mouth once a day  -- Indication: For Health [facility] I will START or STAY ON the medications listed below when I get home from the hospital:    benazepril 40 mg oral tablet  -- 1 tab(s) by mouth once a day  -- Indication: For Hypertension    NovoLOG FlexPen 100 units/mL injectable solution  -- 6 unit(s) injectable 3 times a day  -- Indication: For Diabetes    Tresiba FlexTouch  -- 30 unit(s) subcutaneous once a day (at bedtime)  -- Indication: For Diabetes    simvastatin 20 mg oral tablet  -- 1 tab(s) by mouth once a day (at bedtime)  -- Indication: For Hyperlipidemia     methIMAzole 5 mg oral tablet  -- 1 tab(s) by mouth once a day  -- Indication: For Hyperthyroidism     amLODIPine 10 mg oral tablet  -- 1 tab(s) by mouth once a day  -- Indication: For Hypertension    lansoprazole 30 mg oral delayed release capsule  -- 1 cap(s) by mouth once a day  -- Indication: For Acid reflux     hydrALAZINE 25 mg oral tablet  -- 1 tab(s) by mouth 2 times a day  -- Indication: For Hypertension    folic acid 1 mg oral tablet  -- 1 tab(s) by mouth once a day  -- Indication: For Health [facility]

## 2018-10-09 NOTE — PROVIDER CONTACT NOTE (OTHER) - BACKGROUND
Pt had a questionable allergic reaction to contrast 40 years ago and was nauseous at the time.
At lunch her sugar was 310.
Disorder of the brain

## 2018-10-09 NOTE — PROGRESS NOTE ADULT - PROBLEM SELECTOR PLAN 6
-Pt with right 3rd distal phalanx fracture with ecchymosis  - plastic surgery saw pt. yesterday and said no surgical intervention at this time; pt. to follow up w/ Dr. Alfaro after discharge.   -tylenol prn for pain control

## 2018-10-09 NOTE — DISCHARGE NOTE ADULT - PLAN OF CARE
Follow up with neurosurgery Please call [phone] to schedule an appointment with Dr. Mariola Mclean for follow up of your brain mass which was determined to be a benign tumor called a meningioma. Please follow up Please follow up with your primary care physician Dr. Camarena for a follow up appointment regarding your blood tests performed at the hospital that were done to work-up your anemia. Please follow up with Dr. Alfaro after discharge from the hospital. May call [phone] to schedule an appointment. Please call [phone] to schedule an appointment with Dr. Mariola Mclean for follow up of your brain mass which was determined to be a benign tumor called a meningioma in 2 weeks.

## 2018-10-09 NOTE — DISCHARGE NOTE ADULT - CARE PLAN
Principal Discharge DX:	Brain mass  Goal:	Follow up with neurosurgery  Assessment and plan of treatment:	Please call [phone] to schedule an appointment with Dr. Mariola Mclean for follow up of your brain mass which was determined to be a benign tumor called a meningioma.  Secondary Diagnosis:	Anemia  Goal:	Please follow up  Secondary Diagnosis:	Hand fracture Principal Discharge DX:	Brain mass  Goal:	Follow up with neurosurgery  Assessment and plan of treatment:	Please call [phone] to schedule an appointment with Dr. Mariola Mclean for follow up of your brain mass which was determined to be a benign tumor called a meningioma.  Secondary Diagnosis:	Anemia  Goal:	Please follow up  Assessment and plan of treatment:	Please follow up with your primary care physician Dr. Camarena for a follow up appointment regarding your blood tests performed at the hospital that were done to work-up your anemia.  Secondary Diagnosis:	Hand fracture  Goal:	Please follow up  Assessment and plan of treatment:	Please follow up with Dr. Alfaro after discharge from the hospital. May call [phone] to schedule an appointment. Principal Discharge DX:	Brain mass  Goal:	Follow up with neurosurgery  Assessment and plan of treatment:	Please call [phone] to schedule an appointment with Dr. Mariola Mclean for follow up of your brain mass which was determined to be a benign tumor called a meningioma in 2 weeks.  Secondary Diagnosis:	Anemia  Goal:	Please follow up  Assessment and plan of treatment:	Please follow up with your primary care physician Dr. Camarena for a follow up appointment regarding your blood tests performed at the hospital that were done to work-up your anemia.  Secondary Diagnosis:	Hand fracture  Goal:	Please follow up  Assessment and plan of treatment:	Please follow up with Dr. Alfaro after discharge from the hospital. May call [phone] to schedule an appointment.

## 2018-10-09 NOTE — PROGRESS NOTE ADULT - PROBLEM SELECTOR PLAN 7
-Pt with nondisplaced fracture of the right aspect of the nasal   bone  - no surgical intervention at this time; will f/u outpatient as mentioned above

## 2018-10-09 NOTE — DISCHARGE NOTE ADULT - INSTRUCTIONS
Please maintain a low carbohydrate and low sodium diet because of your diabetes and high blood pressure.

## 2018-10-09 NOTE — DISCHARGE NOTE ADULT - HOSPITAL COURSE
88 yo F w/ hx of DM2, HTN, OA, spinal stenosis, CKD III, chronic anemia, hyperthyroidism presented to the ED after a fall. In the ED a CT of the head revealed an abnormal lesion along the medial left frontal lobe which MRI revealed as a meningioma.  X-rays of bilateral knees and right hand were also done revealing a third distal phalanx fracture.  Pt. was seen by neurosurgery who did not plan to do any surgical intervention and patient was sent to the general medicine floors for metastatic work-up with a CT scan of the chest, abdomen and pelvis which showed no evidence of primary malignancy. Pt. was also seen by orthopedic surgery to evaluate phalanx fracture and recommended splinting finger in the hospital and follow up post discharge.  Pt. was seen by physical therapy 89 yo F w/ hx of DM2, HTN, OA, spinal stenosis, CKD III, chronic anemia, hyperthyroidism presented to the ED after a fall. In the ED a CT of the head revealed an abnormal lesion along the medial left frontal lobe which MRI revealed as a meningioma.  X-rays of bilateral knees and right hand were also done revealing a third distal phalanx fracture.  Pt. was seen by neurosurgery who did not plan to do any surgical intervention and patient was sent to the general medicine floors for metastatic work-up with a CT scan of the chest, abdomen and pelvis which showed no evidence of primary malignancy. Pt. was also seen by orthopedic surgery to evaluate phalanx fracture and recommended splinting finger in the hospital and follow up post discharge.  Pt. was seen by physical therapy and deemed safe to go home w/ home PT. 88 yo F w/ hx of DM2, HTN, OA, spinal stenosis, CKD III, chronic anemia, hyperthyroidism presented to the ED after a fall. In the ED a CT of the head revealed an abnormal lesion along the medial left frontal lobe which MRI revealed as a meningioma.  X-rays of bilateral knees and right hand were also done revealing a third distal phalanx fracture.  Pt. was seen by neurosurgery who did not plan to do any surgical intervention and patient was sent to the general medicine floors for metastatic work-up with a CT scan of the chest, abdomen and pelvis which showed no evidence of primary malignancy. Pt. was also seen by orthopedic surgery to evaluate phalanx fracture and recommended splinting finger in the hospital and follow up post discharge.  Pt. was seen by physical therapy and deemed safe to go home w/ home PT.

## 2018-10-09 NOTE — DISCHARGE NOTE ADULT - CARE PROVIDERS DIRECT ADDRESSES
,DirectAddress_Unknown,[email or web address]

## 2018-10-09 NOTE — PROGRESS NOTE ADULT - PROBLEM SELECTOR PLAN 3
-renally dose meds  -avoid nephrotoxins  -family and pt agree w/ mri w/ contrast despite risk of contrast injury. -renally dose meds  -avoid nephrotoxins

## 2018-10-09 NOTE — PHYSICAL THERAPY INITIAL EVALUATION ADULT - PREDICTED DURATION OF THERAPY (DAYS/WKS), PT EVAL
1. Have you been to the ER, urgent care clinic since your last visit? Hospitalized since your last visit? Yes When: 06/01 jayshree    2. Have you seen or consulted any other health care providers outside of the [address] [place] [address] since your last visit? Include any pap smears or colon screening.  No Patient is currently functioning at baseline (independent) and will be D/C from PT program at this time. Patient will remain on ambulation aide to ensure OOB/mobility/ambulation for remainder of hospital stay.

## 2018-10-09 NOTE — PROGRESS NOTE ADULT - SUBJECTIVE AND OBJECTIVE BOX
Meg Hutson MD  PGY1 | Dept of Internal Medicine  Pager [phone]        Patient is a 89y old  Female who presents with a chief complaint of fall (08 Oct 2018 10:21)      SUBJECTIVE / OVERNIGHT EVENTS:    MEDICATIONS  (STANDING):  amLODIPine   Tablet 10 milliGRAM(s) Oral daily  cholecalciferol 1000 Unit(s) Oral daily  dextrose 50% Injectable 12.5 Gram(s) IV Push once  dextrose 50% Injectable 25 Gram(s) IV Push once  dextrose 50% Injectable 25 Gram(s) IV Push once  folic acid 1 milliGRAM(s) Oral daily  hydrALAZINE 25 milliGRAM(s) Oral every 12 hours  insulin glargine Injectable (LANTUS) 5 Unit(s) SubCutaneous at bedtime  insulin lispro (HumaLOG) corrective regimen sliding scale   SubCutaneous three times a day before meals  insulin lispro (HumaLOG) corrective regimen sliding scale   SubCutaneous at bedtime  levETIRAcetam 500 milliGRAM(s) Oral two times a day  methimazole 5 milliGRAM(s) Oral daily  pantoprazole    Tablet 40 milliGRAM(s) Oral before breakfast  simvastatin 20 milliGRAM(s) Oral at bedtime    MEDICATIONS  (PRN):  acetaminophen   Tablet .. 650 milliGRAM(s) Oral every 6 hours PRN Moderate Pain (4 - 6), Severe Pain (7 - 10)  dextrose 40% Gel 15 Gram(s) Oral once PRN Blood Glucose LESS THAN 70 milliGRAM(s)/deciliter  glucagon  Injectable 1 milliGRAM(s) IntraMuscular once PRN Glucose LESS THAN 70 milligrams/deciliter  oxyCODONE    IR 5 milliGRAM(s) Oral every 6 hours PRN Severe Pain (7 - 10)      Vital Signs Last 24 Hrs  T(C): 36.7 (09 Oct 2018 04:38), Max: 36.7 (08 Oct 2018 14:44)  T(F): 98.1 (09 Oct 2018 04:38), Max: 98.1 (09 Oct 2018 04:38)  HR: 79 (09 Oct 2018 04:38) (70 - 86)  BP: 121/60 (09 Oct 2018 04:38) (121/60 - 169/67)  BP(mean): --  RR: 18 (09 Oct 2018 04:38) (18 - 18)  SpO2: 96% (09 Oct 2018 04:38) (96% - 98%)    CAPILLARY BLOOD GLUCOSE      POCT Blood Glucose.: 255 mg/dL (08 Oct 2018 22:13)  POCT Blood Glucose.: 184 mg/dL (08 Oct 2018 20:32)  POCT Blood Glucose.: 151 mg/dL (08 Oct 2018 17:47)  POCT Blood Glucose.: 310 mg/dL (08 Oct 2018 13:03)  POCT Blood Glucose.: 268 mg/dL (08 Oct 2018 09:17)    I&O's Summary    08 Oct 2018 07:01  -  09 Oct 2018 07:00  --------------------------------------------------------  IN: 490 mL / OUT: 0 mL / NET: 490 mL          PHYSICAL EXAM:  GENERAL: NAD, well-developed  HEAD:  b/l eye contusions, birthmark on left temporal side of face, contusion on forehead   EYES: sclera clear  CHEST/LUNG: Clear to auscultation bilaterally; No wheeze  HEART: Regular rate and rhythm; No murmurs, rubs, or gallops  ABDOMEN: Soft, Nontender, Nondistended; Bowel sounds present  EXTREMITIES:  swollen R knee; b/l knee scars from old knee replacement surgery  PSYCH: AAOx3  NEUROLOGY: non-focal      LABS:                        10.1   7.2   )-----------( 298      ( 08 Oct 2018 14:45 )             30.7     Auto Eosinophil # x     / Auto Eosinophil % x     / Auto Neutrophil # x     / Auto Neutrophil % x     / BANDS % x                            8.7    5.4   )-----------( 270      ( 08 Oct 2018 06:49 )             27.5     Auto Eosinophil # 0.2   / Auto Eosinophil % 4.0   / Auto Neutrophil # 3.0   / Auto Neutrophil % 55.9  / BANDS % x                            9.4    6.80  )-----------( 293      ( 07 Oct 2018 10:15 )             29.8     Auto Eosinophil # x     / Auto Eosinophil % x     / Auto Neutrophil # x     / Auto Neutrophil % x     / BANDS % x        10-08    142  |  109<H>  |  13  ----------------------------<  187<H>  3.6   |  23  |  0.98  10-07    143  |  108  |  16  ----------------------------<  220<H>  3.7   |  24  |  0.96    Ca    8.5      08 Oct 2018 06:47  Mg     1.7     10-08  Phos  1.9     10-08  TPro  6.2  /  Alb  3.4  /  TBili  0.3  /  DBili  x   /  AST  16  /  ALT  8<L>  /  AlkPhos  65  10-08          10/8/2018 MRI head w/ and w/out contrast  EXAM: MR BRAIN WAW IC       PROCEDURE DATE: 10/07/2018           INTERPRETATION: Contrast-enhanced MRI of the brain.     CLINICAL INDICATION: Status post fall, with vasogenic edema on recent brain   CT     TECHNIQUE: Multiplanar, multisequence MR images of the brain were obtained   before and after the intravenous administration of 10 cc of Gadavist. 0 cc   were discarded.     COMPARISON: CT brain 10/6/2018     FINDINGS: There is a 3.3 x 2.0 x 3.0 cm (anterior-posterior by transverse   by craniocaudad) avidly enhancing left parasagittal frontal extra-axial   mass. There is vasogenic edema and local mass effect in the adjacent left   frontal lobe.     No hydrocephalus, midline shift, acute intracranial hemorrhage, or acute   infarction. There is mild white matter microvascular ischemic disease.   Signal voids are seen within the major intracranial vessels consistent with   their patency.     No abnormal parenchymal or leptomeningeal enhancement. There is a T1   isointense, T2 and FLAIR hyperintense, enhancing lesion in the high left   anterior parietal bone which corresponds to a lucent lesion seen on the   prior CT, likely representing an hemangioma.     Right mastoid air cell effusion. Paranasal sinuses clear. The orbits, sellar   and suprasellar structures, and craniocervical junction are unremarkable.     IMPRESSION:     3.3 cm avidly enhancing left parasagittal frontal extra-axial mass with   adjacent vasogenic edema and local mass effect. Findings most consistent   with a meningioma.     T1 isointense, T2 and FLAIR hyperintense, enhancing lesion in the high left   anterior parietal bone which corresponds to a lucent lesion seen on the   prior CT, likely representing an hemangioma.     No abnormal parenchymal or leptomeningeal enhancement.     CT chest/abdomen/pelvis report pending Meg Hutson MD  PGY1 | Dept of Internal Medicine  Pager [phone]        Patient is a 89y old  Female who presents with a chief complaint of fall (08 Oct 2018 10:21)      SUBJECTIVE / OVERNIGHT EVENTS: no acute events o/n.  Pt endorses a slight headache but no changes in vision, N or V.  ROS neg for CP, SOB, abdominal pain, diarrhea, constipation or dysuria.     MEDICATIONS  (STANDING):  amLODIPine   Tablet 10 milliGRAM(s) Oral daily  cholecalciferol 1000 Unit(s) Oral daily  dextrose 50% Injectable 12.5 Gram(s) IV Push once  dextrose 50% Injectable 25 Gram(s) IV Push once  dextrose 50% Injectable 25 Gram(s) IV Push once  folic acid 1 milliGRAM(s) Oral daily  hydrALAZINE 25 milliGRAM(s) Oral every 12 hours  insulin glargine Injectable (LANTUS) 5 Unit(s) SubCutaneous at bedtime  insulin lispro (HumaLOG) corrective regimen sliding scale   SubCutaneous three times a day before meals  insulin lispro (HumaLOG) corrective regimen sliding scale   SubCutaneous at bedtime  levETIRAcetam 500 milliGRAM(s) Oral two times a day  methimazole 5 milliGRAM(s) Oral daily  pantoprazole    Tablet 40 milliGRAM(s) Oral before breakfast  simvastatin 20 milliGRAM(s) Oral at bedtime    MEDICATIONS  (PRN):  acetaminophen   Tablet .. 650 milliGRAM(s) Oral every 6 hours PRN Moderate Pain (4 - 6), Severe Pain (7 - 10)  dextrose 40% Gel 15 Gram(s) Oral once PRN Blood Glucose LESS THAN 70 milliGRAM(s)/deciliter  glucagon  Injectable 1 milliGRAM(s) IntraMuscular once PRN Glucose LESS THAN 70 milligrams/deciliter  oxyCODONE    IR 5 milliGRAM(s) Oral every 6 hours PRN Severe Pain (7 - 10)      Vital Signs Last 24 Hrs  T(C): 36.7 (09 Oct 2018 04:38), Max: 36.7 (08 Oct 2018 14:44)  T(F): 98.1 (09 Oct 2018 04:38), Max: 98.1 (09 Oct 2018 04:38)  HR: 79 (09 Oct 2018 04:38) (70 - 86)  BP: 121/60 (09 Oct 2018 04:38) (121/60 - 169/67)  BP(mean): --  RR: 18 (09 Oct 2018 04:38) (18 - 18)  SpO2: 96% (09 Oct 2018 04:38) (96% - 98%)    CAPILLARY BLOOD GLUCOSE      POCT Blood Glucose.: 255 mg/dL (08 Oct 2018 22:13)  POCT Blood Glucose.: 184 mg/dL (08 Oct 2018 20:32)  POCT Blood Glucose.: 151 mg/dL (08 Oct 2018 17:47)  POCT Blood Glucose.: 310 mg/dL (08 Oct 2018 13:03)  POCT Blood Glucose.: 268 mg/dL (08 Oct 2018 09:17)    I&O's Summary    08 Oct 2018 07:01  -  09 Oct 2018 07:00  --------------------------------------------------------  IN: 490 mL / OUT: 0 mL / NET: 490 mL          PHYSICAL EXAM:  GENERAL: NAD, well-developed  HEAD:  b/l eye contusions, birthmark on left temporal side of face, contusion on forehead   EYES: sclera clear  CHEST/LUNG: Clear to auscultation bilaterally; No wheeze  HEART: Regular rate and rhythm; No murmurs, rubs, or gallops  ABDOMEN: Soft, Nontender, Nondistended; Bowel sounds present  EXTREMITIES:  swollen R knee; b/l knee scars from old knee replacement surgery  PSYCH: AAOx3  NEUROLOGY: non-focal      LABS:                        10.1   7.2   )-----------( 298      ( 08 Oct 2018 14:45 )             30.7     Auto Eosinophil # x     / Auto Eosinophil % x     / Auto Neutrophil # x     / Auto Neutrophil % x     / BANDS % x                            8.7    5.4   )-----------( 270      ( 08 Oct 2018 06:49 )             27.5     Auto Eosinophil # 0.2   / Auto Eosinophil % 4.0   / Auto Neutrophil # 3.0   / Auto Neutrophil % 55.9  / BANDS % x                            9.4    6.80  )-----------( 293      ( 07 Oct 2018 10:15 )             29.8     Auto Eosinophil # x     / Auto Eosinophil % x     / Auto Neutrophil # x     / Auto Neutrophil % x     / BANDS % x        10-08    142  |  109<H>  |  13  ----------------------------<  187<H>  3.6   |  23  |  0.98  10-07    143  |  108  |  16  ----------------------------<  220<H>  3.7   |  24  |  0.96    Ca    8.5      08 Oct 2018 06:47  Mg     1.7     10-08  Phos  1.9     10-08  TPro  6.2  /  Alb  3.4  /  TBili  0.3  /  DBili  x   /  AST  16  /  ALT  8<L>  /  AlkPhos  65  10-08          10/8/2018 MRI head w/ and w/out contrast  EXAM: MR BRAIN WAW IC       PROCEDURE DATE: 10/07/2018           INTERPRETATION: Contrast-enhanced MRI of the brain.     CLINICAL INDICATION: Status post fall, with vasogenic edema on recent brain   CT     TECHNIQUE: Multiplanar, multisequence MR images of the brain were obtained   before and after the intravenous administration of 10 cc of Gadavist. 0 cc   were discarded.     COMPARISON: CT brain 10/6/2018     FINDINGS: There is a 3.3 x 2.0 x 3.0 cm (anterior-posterior by transverse   by craniocaudad) avidly enhancing left parasagittal frontal extra-axial   mass. There is vasogenic edema and local mass effect in the adjacent left   frontal lobe.     No hydrocephalus, midline shift, acute intracranial hemorrhage, or acute   infarction. There is mild white matter microvascular ischemic disease.   Signal voids are seen within the major intracranial vessels consistent with   their patency.     No abnormal parenchymal or leptomeningeal enhancement. There is a T1   isointense, T2 and FLAIR hyperintense, enhancing lesion in the high left   anterior parietal bone which corresponds to a lucent lesion seen on the   prior CT, likely representing an hemangioma.     Right mastoid air cell effusion. Paranasal sinuses clear. The orbits, sellar   and suprasellar structures, and craniocervical junction are unremarkable.     IMPRESSION:     3.3 cm avidly enhancing left parasagittal frontal extra-axial mass with   adjacent vasogenic edema and local mass effect. Findings most consistent   with a meningioma.     T1 isointense, T2 and FLAIR hyperintense, enhancing lesion in the high left   anterior parietal bone which corresponds to a lucent lesion seen on the   prior CT, likely representing an hemangioma.     No abnormal parenchymal or leptomeningeal enhancement.     CT chest/abdomen/pelvis report pending

## 2018-10-09 NOTE — PHYSICAL THERAPY INITIAL EVALUATION ADULT - CRITERIA FOR SKILLED THERAPEUTIC INTERVENTIONS
anticipated equipment needs at discharge/impairments found/functional limitations in following categories/risk reduction/prevention/therapy frequency/anticipated discharge recommendation/predicted duration of therapy intervention/rehab potential

## 2018-10-09 NOTE — DISCHARGE NOTE ADULT - ADDITIONAL INSTRUCTIONS
Please call [phone] to schedule an appointment with Dr. Mariola Mclean for follow up of your brain mass which was determined to be a benign tumor called a meningioma in 2 weeks.    Please follow up with your primary care physician Dr. Camarena for a follow up appointment regarding your blood tests performed at the hospital that were done to work-up your anemia.

## 2018-10-09 NOTE — DISCHARGE NOTE ADULT - CARE PROVIDER_API CALL
Arsen Haro), Plastic Surgery  [address]  [address]  [place] [address]  Phone: [phone]  Fax: [phone]    Mariola Mclean), [facility] Neurosurgery  General  [address]  [address]  [place] [address]  Phone: [phone]  Fax: [phone]

## 2018-10-10 VITALS — WEIGHT: 198.64 LBS

## 2018-10-10 LAB
FERRITIN SERPL-MCNC: 92 NG/ML — SIGNIFICANT CHANGE UP (ref 15–150)
FOLATE SERPL-MCNC: >20 NG/ML — SIGNIFICANT CHANGE UP
GLUCOSE BLDC GLUCOMTR-MCNC: 262 MG/DL — HIGH (ref 70–99)
IRON SATN MFR SERPL: 10 % — LOW (ref 14–50)
IRON SATN MFR SERPL: 31 UG/DL — SIGNIFICANT CHANGE UP (ref 30–160)
TIBC SERPL-MCNC: 296 UG/DL — SIGNIFICANT CHANGE UP (ref 220–430)
UIBC SERPL-MCNC: 265 UG/DL — SIGNIFICANT CHANGE UP (ref 110–370)
VIT B12 SERPL-MCNC: 508 PG/ML — SIGNIFICANT CHANGE UP (ref 232–1245)

## 2018-10-10 PROCEDURE — 99239 HOSP IP/OBS DSCHRG MGMT >30: CPT

## 2018-10-10 RX ADMIN — OXYCODONE HYDROCHLORIDE 5 MILLIGRAM(S): 5 TABLET ORAL at 10:34

## 2018-10-10 RX ADMIN — AMLODIPINE BESYLATE 10 MILLIGRAM(S): 2.5 TABLET ORAL at 05:23

## 2018-10-10 RX ADMIN — OXYCODONE HYDROCHLORIDE 5 MILLIGRAM(S): 5 TABLET ORAL at 09:59

## 2018-10-10 RX ADMIN — PANTOPRAZOLE SODIUM 40 MILLIGRAM(S): 20 TABLET, DELAYED RELEASE ORAL at 05:23

## 2018-10-10 RX ADMIN — Medication 3: at 08:49

## 2018-10-10 RX ADMIN — Medication 25 MILLIGRAM(S): at 05:23

## 2018-10-10 NOTE — PROGRESS NOTE ADULT - SUBJECTIVE AND OBJECTIVE BOX
Meg Hutson MD  PGY1 | Dept of Internal Medicine  Pager [phone]        Patient is a 89y old  Female who presents with a chief complaint of fall (09 Oct 2018 13:38)      SUBJECTIVE / OVERNIGHT EVENTS: no events o/n.  ROS neg for CP, SOB, abdominal pain, N/V/D/C or dysuria.     MEDICATIONS  (STANDING):  amLODIPine   Tablet 10 milliGRAM(s) Oral daily  cholecalciferol 1000 Unit(s) Oral daily  dextrose 50% Injectable 12.5 Gram(s) IV Push once  dextrose 50% Injectable 25 Gram(s) IV Push once  dextrose 50% Injectable 25 Gram(s) IV Push once  folic acid 1 milliGRAM(s) Oral daily  hydrALAZINE 25 milliGRAM(s) Oral every 12 hours  insulin glargine Injectable (LANTUS) 5 Unit(s) SubCutaneous at bedtime  insulin lispro (HumaLOG) corrective regimen sliding scale   SubCutaneous three times a day before meals  insulin lispro (HumaLOG) corrective regimen sliding scale   SubCutaneous at bedtime  methimazole 5 milliGRAM(s) Oral daily  pantoprazole    Tablet 40 milliGRAM(s) Oral before breakfast  simvastatin 20 milliGRAM(s) Oral at bedtime    MEDICATIONS  (PRN):  acetaminophen   Tablet .. 650 milliGRAM(s) Oral every 6 hours PRN Moderate Pain (4 - 6), Severe Pain (7 - 10)  dextrose 40% Gel 15 Gram(s) Oral once PRN Blood Glucose LESS THAN 70 milliGRAM(s)/deciliter  glucagon  Injectable 1 milliGRAM(s) IntraMuscular once PRN Glucose LESS THAN 70 milligrams/deciliter  oxyCODONE    IR 5 milliGRAM(s) Oral every 6 hours PRN Severe Pain (7 - 10)      Vital Signs Last 24 Hrs  T(C): 36.6 (10 Oct 2018 04:45), Max: 36.6 (10 Oct 2018 04:45)  T(F): 97.9 (10 Oct 2018 04:45), Max: 97.9 (10 Oct 2018 04:45)  HR: 77 (10 Oct 2018 04:45) (76 - 82)  BP: 136/66 (10 Oct 2018 04:45) (125/62 - 138/72)  BP(mean): --  RR: 18 (10 Oct 2018 04:45) (18 - 18)  SpO2: 95% (10 Oct 2018 04:45) (95% - 98%)    CAPILLARY BLOOD GLUCOSE      POCT Blood Glucose.: 233 mg/dL (09 Oct 2018 21:55)  POCT Blood Glucose.: 91 mg/dL (09 Oct 2018 17:56)  POCT Blood Glucose.: 242 mg/dL (09 Oct 2018 15:40)  POCT Blood Glucose.: 390 mg/dL (09 Oct 2018 14:27)  POCT Blood Glucose.: 401 mg/dL (09 Oct 2018 14:05)  POCT Blood Glucose.: 452 mg/dL (09 Oct 2018 12:27)  POCT Blood Glucose.: 553 mg/dL (09 Oct 2018 11:27)  POCT Blood Glucose.: 592 mg/dL (09 Oct 2018 10:52)  POCT Blood Glucose.: 564 mg/dL (09 Oct 2018 10:50)  POCT Blood Glucose.: 392 mg/dL (09 Oct 2018 08:45)    I&O's Summary      PHYSICAL EXAM:  GENERAL: NAD, well-developed  HEAD:  Atraumatic, Normocephalic  EYES: EOMI, PERRLA, conjunctiva and sclera clear  NECK: Supple, No JVD  CHEST/LUNG: Clear to auscultation bilaterally; No wheeze  HEART: Regular rate and rhythm; No murmurs, rubs, or gallops  ABDOMEN: Soft, Nontender, Nondistended; Bowel sounds present  EXTREMITIES:  2+ Peripheral Pulses, No clubbing, cyanosis, or edema  PSYCH: AAOx3  NEUROLOGY: non-focal  SKIN: No rashes or lesions    LABS:                        8.9    7.4   )-----------( 270      ( 09 Oct 2018 07:18 )             27.3     Auto Eosinophil # x     / Auto Eosinophil % x     / Auto Neutrophil # x     / Auto Neutrophil % x     / BANDS % x                            10.1   7.2   )-----------( 298      ( 08 Oct 2018 14:45 )             30.7     Auto Eosinophil # x     / Auto Eosinophil % x     / Auto Neutrophil # x     / Auto Neutrophil % x     / BANDS % x        10-09    138  |  104  |  23  ----------------------------<  396<H>  4.3   |  22  |  1.16    Ca    8.6      09 Oct 2018 07:18  Mg     1.9     10-09  Phos  3.5     10-09 Meg Hutson MD  PGY1 | Dept of Internal Medicine  Pager [phone]        Patient is a 89y old  Female who presents with a chief complaint of fall (09 Oct 2018 13:38)      SUBJECTIVE / OVERNIGHT EVENTS: no events o/n.  ROS neg for CP, SOB, abdominal pain, N/V/D/C or dysuria.     MEDICATIONS  (STANDING):  amLODIPine   Tablet 10 milliGRAM(s) Oral daily  cholecalciferol 1000 Unit(s) Oral daily  dextrose 50% Injectable 12.5 Gram(s) IV Push once  dextrose 50% Injectable 25 Gram(s) IV Push once  dextrose 50% Injectable 25 Gram(s) IV Push once  folic acid 1 milliGRAM(s) Oral daily  hydrALAZINE 25 milliGRAM(s) Oral every 12 hours  insulin glargine Injectable (LANTUS) 5 Unit(s) SubCutaneous at bedtime  insulin lispro (HumaLOG) corrective regimen sliding scale   SubCutaneous three times a day before meals  insulin lispro (HumaLOG) corrective regimen sliding scale   SubCutaneous at bedtime  methimazole 5 milliGRAM(s) Oral daily  pantoprazole    Tablet 40 milliGRAM(s) Oral before breakfast  simvastatin 20 milliGRAM(s) Oral at bedtime    MEDICATIONS  (PRN):  acetaminophen   Tablet .. 650 milliGRAM(s) Oral every 6 hours PRN Moderate Pain (4 - 6), Severe Pain (7 - 10)  dextrose 40% Gel 15 Gram(s) Oral once PRN Blood Glucose LESS THAN 70 milliGRAM(s)/deciliter  glucagon  Injectable 1 milliGRAM(s) IntraMuscular once PRN Glucose LESS THAN 70 milligrams/deciliter  oxyCODONE    IR 5 milliGRAM(s) Oral every 6 hours PRN Severe Pain (7 - 10)      Vital Signs Last 24 Hrs  T(C): 36.6 (10 Oct 2018 04:45), Max: 36.6 (10 Oct 2018 04:45)  T(F): 97.9 (10 Oct 2018 04:45), Max: 97.9 (10 Oct 2018 04:45)  HR: 77 (10 Oct 2018 04:45) (76 - 82)  BP: 136/66 (10 Oct 2018 04:45) (125/62 - 138/72)  BP(mean): --  RR: 18 (10 Oct 2018 04:45) (18 - 18)  SpO2: 95% (10 Oct 2018 04:45) (95% - 98%)    CAPILLARY BLOOD GLUCOSE      POCT Blood Glucose.: 233 mg/dL (09 Oct 2018 21:55)  POCT Blood Glucose.: 91 mg/dL (09 Oct 2018 17:56)  POCT Blood Glucose.: 242 mg/dL (09 Oct 2018 15:40)  POCT Blood Glucose.: 390 mg/dL (09 Oct 2018 14:27)  POCT Blood Glucose.: 401 mg/dL (09 Oct 2018 14:05)  POCT Blood Glucose.: 452 mg/dL (09 Oct 2018 12:27)  POCT Blood Glucose.: 553 mg/dL (09 Oct 2018 11:27)  POCT Blood Glucose.: 592 mg/dL (09 Oct 2018 10:52)  POCT Blood Glucose.: 564 mg/dL (09 Oct 2018 10:50)  POCT Blood Glucose.: 392 mg/dL (09 Oct 2018 08:45)    I&O's Summary      PHYSICAL EXAM:  GENERAL: NAD, well-developed  HEAD:  b/l eye contusions, birthmark on left temporal side of face, contusion on forehead   EYES: sclera clear  CHEST/LUNG: Clear to auscultation bilaterally; No wheeze  HEART: Regular rate and rhythm; No murmurs, rubs, or gallops  ABDOMEN: Soft, Nontender, Nondistended; Bowel sounds present  EXTREMITIES:  swollen R knee; b/l knee scars from old knee replacement surgery  PSYCH: AAOx3  NEUROLOGY: non-focal      LABS:                        8.9    7.4   )-----------( 270      ( 09 Oct 2018 07:18 )             27.3     Auto Eosinophil # x     / Auto Eosinophil % x     / Auto Neutrophil # x     / Auto Neutrophil % x     / BANDS % x                            10.1   7.2   )-----------( 298      ( 08 Oct 2018 14:45 )             30.7     Auto Eosinophil # x     / Auto Eosinophil % x     / Auto Neutrophil # x     / Auto Neutrophil % x     / BANDS % x        10-09    138  |  104  |  23  ----------------------------<  396<H>  4.3   |  22  |  1.16    Ca    8.6      09 Oct 2018 07:18  Mg     1.9     10-09  Phos  3.5     10-09

## 2018-10-10 NOTE — PROGRESS NOTE ADULT - PROBLEM SELECTOR PLAN 2
-Hx c/w mechanical fall  -fall precaution / bed alarm  -vitamin d 1000u/day   -PT said pt. is ok for discharge w/ home PT. D/c today

## 2018-10-10 NOTE — PROGRESS NOTE ADULT - PROBLEM SELECTOR PLAN 8
- DVT ppx: SCDs
-Pt with nondisplaced fracture of the right aspect of the nasal   bone  -D/w hand sx pending full eval
- DVT ppx: SCDs

## 2018-10-10 NOTE — PROGRESS NOTE ADULT - ASSESSMENT
88 yo F w/ hx of DM2, HTN, OA, spinal stenosis, CKD III, chronic anemia, hyperthyroidism presents after a fall w/ CT head showing abnormal lesion along the L frontal lobe concerning for malignancy now undergoing metastatic workup ready for discharge w/ home PT.

## 2018-10-10 NOTE — PROGRESS NOTE ADULT - ATTENDING COMMENTS
D/w family at bedside updated on full plan of care
Discharge time spent: 36 min
pt now with medication induced hyperglycemia in the setting of steroid use for premedication of contrast which was done yesterday.  pt did not have a reaction to the contrast she received yesterday.  pt discharge pending physical therapy eval, if cleared from PT perspective then patient can go home today. Case management team aware of the plan.   will clarify with [facility] if Keppra is still needed upon discharge.  a finger splint was placed on patients fractured finger.  Discharge time spent: 36 min

## 2018-10-29 ENCOUNTER — APPOINTMENT (OUTPATIENT)
Dept: NEUROSURGERY | Facility: CLINIC | Age: 84
End: 2018-10-29
Payer: MEDICARE

## 2018-10-29 VITALS
BODY MASS INDEX: 37.36 KG/M2 | HEIGHT: 62 IN | HEART RATE: 91 BPM | SYSTOLIC BLOOD PRESSURE: 166 MMHG | WEIGHT: 203 LBS | DIASTOLIC BLOOD PRESSURE: 75 MMHG

## 2018-10-29 DIAGNOSIS — Z86.39 PERSONAL HISTORY OF OTHER ENDOCRINE, NUTRITIONAL AND METABOLIC DISEASE: ICD-10-CM

## 2018-10-29 DIAGNOSIS — D32.0 BENIGN NEOPLASM OF CEREBRAL MENINGES: ICD-10-CM

## 2018-10-29 DIAGNOSIS — M19.90 UNSPECIFIED OSTEOARTHRITIS, UNSPECIFIED SITE: ICD-10-CM

## 2018-10-29 DIAGNOSIS — N28.9 DISORDER OF KIDNEY AND URETER, UNSPECIFIED: ICD-10-CM

## 2018-10-29 DIAGNOSIS — Z86.79 PERSONAL HISTORY OF OTHER DISEASES OF THE CIRCULATORY SYSTEM: ICD-10-CM

## 2018-10-29 PROCEDURE — 99213 OFFICE O/P EST LOW 20 MIN: CPT

## 2018-10-30 PROBLEM — M19.90 ARTHRITIS: Status: RESOLVED | Noted: 2018-10-30 | Resolved: 2018-10-30

## 2018-10-30 PROBLEM — N28.9 KIDNEY DISEASE: Status: RESOLVED | Noted: 2018-10-30 | Resolved: 2018-10-30

## 2018-10-30 PROBLEM — D32.0 BENIGN MENINGIOMA OF BRAIN: Status: ACTIVE | Noted: 2018-10-30

## 2018-10-30 PROBLEM — D32.0 CEREBRAL MENINGIOMA: Status: ACTIVE | Noted: 2018-10-30

## 2018-10-30 RX ORDER — SIMVASTATIN 20 MG/1
20 TABLET, FILM COATED ORAL
Refills: 0 | Status: ACTIVE | COMMUNITY

## 2018-10-30 RX ORDER — METHIMAZOLE 5 MG/1
5 TABLET ORAL DAILY
Refills: 0 | Status: ACTIVE | COMMUNITY

## 2018-10-30 RX ORDER — HYDRALAZINE HYDROCHLORIDE 25 MG/1
25 TABLET ORAL TWICE DAILY
Refills: 0 | Status: ACTIVE | COMMUNITY

## 2018-10-30 RX ORDER — AMLODIPINE BESYLATE 10 MG/1
10 TABLET ORAL DAILY
Refills: 0 | Status: ACTIVE | COMMUNITY

## 2018-10-30 RX ORDER — INSULIN ASPART 100 [IU]/ML
100 INJECTION, SOLUTION INTRAVENOUS; SUBCUTANEOUS
Refills: 0 | Status: ACTIVE | COMMUNITY

## 2018-10-30 RX ORDER — LANSOPRAZOLE 30 MG/1
30 CAPSULE, DELAYED RELEASE ORAL DAILY
Refills: 0 | Status: ACTIVE | COMMUNITY

## 2018-10-30 RX ORDER — INSULIN DEGLUDEC INJECTION 100 U/ML
100 INJECTION, SOLUTION SUBCUTANEOUS
Refills: 0 | Status: ACTIVE | COMMUNITY

## 2018-10-30 RX ORDER — BENAZEPRIL HYDROCHLORIDE 40 MG/1
40 TABLET, FILM COATED ORAL DAILY
Refills: 0 | Status: ACTIVE | COMMUNITY

## 2018-10-30 RX ORDER — FOLIC ACID 1 MG/1
1 TABLET ORAL DAILY
Refills: 0 | Status: ACTIVE | COMMUNITY

## 2018-11-11 PROCEDURE — 80053 COMPREHEN METABOLIC PANEL: CPT

## 2018-11-11 PROCEDURE — 70553 MRI BRAIN STEM W/O & W/DYE: CPT

## 2018-11-11 PROCEDURE — 99285 EMERGENCY DEPT VISIT HI MDM: CPT | Mod: 25

## 2018-11-11 PROCEDURE — 90471 IMMUNIZATION ADMIN: CPT

## 2018-11-11 PROCEDURE — 81001 URINALYSIS AUTO W/SCOPE: CPT

## 2018-11-11 PROCEDURE — 83550 IRON BINDING TEST: CPT

## 2018-11-11 PROCEDURE — 85027 COMPLETE CBC AUTOMATED: CPT

## 2018-11-11 PROCEDURE — 90715 TDAP VACCINE 7 YRS/> IM: CPT

## 2018-11-11 PROCEDURE — 93005 ELECTROCARDIOGRAM TRACING: CPT

## 2018-11-11 PROCEDURE — 82962 GLUCOSE BLOOD TEST: CPT

## 2018-11-11 PROCEDURE — 74177 CT ABD & PELVIS W/CONTRAST: CPT

## 2018-11-11 PROCEDURE — 82746 ASSAY OF FOLIC ACID SERUM: CPT

## 2018-11-11 PROCEDURE — 97161 PT EVAL LOW COMPLEX 20 MIN: CPT

## 2018-11-11 PROCEDURE — 82607 VITAMIN B-12: CPT

## 2018-11-11 PROCEDURE — 73564 X-RAY EXAM KNEE 4 OR MORE: CPT

## 2018-11-11 PROCEDURE — 73140 X-RAY EXAM OF FINGER(S): CPT

## 2018-11-11 PROCEDURE — 80048 BASIC METABOLIC PNL TOTAL CA: CPT

## 2018-11-11 PROCEDURE — 83735 ASSAY OF MAGNESIUM: CPT

## 2018-11-11 PROCEDURE — 83036 HEMOGLOBIN GLYCOSYLATED A1C: CPT

## 2018-11-11 PROCEDURE — 87086 URINE CULTURE/COLONY COUNT: CPT

## 2018-11-11 PROCEDURE — 84100 ASSAY OF PHOSPHORUS: CPT

## 2018-11-11 PROCEDURE — 84466 ASSAY OF TRANSFERRIN: CPT

## 2018-11-11 PROCEDURE — 82728 ASSAY OF FERRITIN: CPT

## 2019-03-21 NOTE — CHART NOTE - NSCHARTNOTEFT_GEN_A_CORE
Patient is a 89y old  Female who presents with a chief complaint of fall      HPI:  88 yo F w/ hx of DM2, HTN, OA, spinal stenosis, CKD III, chronic anemia, hyperthyroidism presents after a fall. Patient reports she was at home, got up to use the bathroom and lost balance. She fell forward, landed on right knee and right outstretched hand. Patient reports also head injury. She did not lose consciousness. She did not have any prodrome symptoms or chest pain. She denies urinary/fecal incontinence or post-ictal state after the fall. The fall was witnessed by her daughter who helped her get up. The patient was able to bear weight and ambulate without any hip or groin pain. She was driven by her daughter to [facility]. Head imaging there was found to have concern for brain mass and patient was transferred to our hospital. Patient currently has no symptoms.     Upon arrival to [address] [place] [address]   PMHx/PSHx:   PAST MEDICAL & SURGICAL HISTORY:  Cervical spinal stenosis  Anemia  DUB (dysfunctional uterine bleeding): hysterectomy age 31&#x27;s  Cataract  Spinal stenosis  Obesity  HTN (hypertension)  Anxiety  Dyslipidemia  Spinal stenosis, lumbar  Arthritis  Diabetes mellitus: Type 2 IDDM  L3- L5 laminectomy with fusion: 2/2013  History of colon polyps removal: colonoscopy 2010  History of total knee replacement: right 2006  left 2011  Hx of cataract surgery: bilateral 2008  H/O mastoidectomy: Right ear  History of cholecystectomy: 1960&#x27;s  History of appendectomy: 1960&#x27;s  History of hysterectomy: age: 31&#x27;s      Social Hx:     Allergies: Allergies    aspirin (Stomach Upset)  codeine (Rash)  iodine (Swelling)  IV Contrast (Short breath)  penicillin (Swelling)  vancomycin (Hives)    Intolerances        Medications Standing   MEDICATIONS  (STANDING):  amLODIPine   Tablet 10 milliGRAM(s) Oral daily  cholecalciferol 1000 Unit(s) Oral daily  dextrose 50% Injectable 12.5 Gram(s) IV Push once  dextrose 50% Injectable 25 Gram(s) IV Push once  dextrose 50% Injectable 25 Gram(s) IV Push once  folic acid 1 milliGRAM(s) Oral daily  hydrALAZINE 25 milliGRAM(s) Oral every 12 hours  insulin glargine Injectable (LANTUS) 5 Unit(s) SubCutaneous at bedtime  insulin lispro (HumaLOG) corrective regimen sliding scale   SubCutaneous three times a day before meals  insulin lispro (HumaLOG) corrective regimen sliding scale   SubCutaneous at bedtime  levETIRAcetam 500 milliGRAM(s) Oral two times a day  methimazole 5 milliGRAM(s) Oral daily  pantoprazole    Tablet 40 milliGRAM(s) Oral before breakfast  simvastatin 20 milliGRAM(s) Oral at bedtime      Medications PRN   MEDICATIONS  (PRN):  acetaminophen   Tablet .. 650 milliGRAM(s) Oral every 6 hours PRN Moderate Pain (4 - 6), Severe Pain (7 - 10)  dextrose 40% Gel 15 Gram(s) Oral once PRN Blood Glucose LESS THAN 70 milliGRAM(s)/deciliter  glucagon  Injectable 1 milliGRAM(s) IntraMuscular once PRN Glucose LESS THAN 70 milligrams/deciliter  oxyCODONE    IR 5 milliGRAM(s) Oral every 6 hours PRN Severe Pain (7 - 10)      Physical Exam:   General: NAD   HEENT: EOMI, PEERL, Nl OP, Nl thyroid gland, No lymphadenopathy, No JVD   CVS: RRR, No murmurs/gallops/regurg  Resp: CTA bilaterally, no rhonchi, rales, wheeze  Abd: Nl BS, soft, NT, ND   :   Ext:     LABS   CBC                       9.4    6.80  )-----------( 293      ( 07 Oct 2018 10:15 )             29.8     CMP 10-07    143  |  108  |  16  ----------------------------<  220<H>  3.7   |  24  |  0.96    Ca    8.6      07 Oct 2018 07:41      PT/INR - ( 07 Oct 2018 00:22 )   PT: 11.4 sec;   INR: 1.04 ratio         PTT - ( 07 Oct 2018 00:22 )  PTT:36.8 sec    Radiology:     Assessment:     Plan: Patient is a 89y old  Female who presents with a chief complaint of fall      HPI:  89 yo F w/ hx of DM2, HTN, OA, spinal stenosis, CKD III, chronic anemia, hyperthyroidism presents after a fall. Patient reports she was at home, got up to use the bathroom and lost balance. She fell forward, landed on right knee and right outstretched hand. Patient reports also head injury. She did not lose consciousness. She did not have any prodrome symptoms or chest pain. She denies urinary/fecal incontinence or post-ictal state after the fall. The fall was witnessed by her daughter who helped her get up. The patient was able to bear weight and ambulate without any hip or groin pain. She was driven by her daughter to [facility]. Head imaging there was found to have concern for brain mass and patient was transferred to our hospital. Patient currently has no symptoms.     Upon arrival to [address] [place] [address]       PMHx/PSHx:   PAST MEDICAL & SURGICAL HISTORY:  Cervical spinal stenosis  Anemia  DUB (dysfunctional uterine bleeding): hysterectomy age 31&#x27;s  Cataract  Spinal stenosis  Obesity  HTN (hypertension)  Anxiety  Dyslipidemia  Spinal stenosis, lumbar  Arthritis  Diabetes mellitus: Type 2 IDDM  L3- L5 laminectomy with fusion: 2/2013  History of colon polyps removal: colonoscopy 2010  History of total knee replacement: right 2006  left 2011  Hx of cataract surgery: bilateral 2008  H/O mastoidectomy: Right ear  History of cholecystectomy: 1960&#x27;s  History of appendectomy: 1960&#x27;s  History of hysterectomy: age: 31&#x27;s      Social Hx:     Allergies: Allergies    aspirin (Stomach Upset)  codeine (Rash)  iodine (Swelling)  IV Contrast (Short breath)  penicillin (Swelling)  vancomycin (Hives)    Intolerances        Medications Standing   MEDICATIONS  (STANDING):  amLODIPine   Tablet 10 milliGRAM(s) Oral daily  cholecalciferol 1000 Unit(s) Oral daily  dextrose 50% Injectable 12.5 Gram(s) IV Push once  dextrose 50% Injectable 25 Gram(s) IV Push once  dextrose 50% Injectable 25 Gram(s) IV Push once  folic acid 1 milliGRAM(s) Oral daily  hydrALAZINE 25 milliGRAM(s) Oral every 12 hours  insulin glargine Injectable (LANTUS) 5 Unit(s) SubCutaneous at bedtime  insulin lispro (HumaLOG) corrective regimen sliding scale   SubCutaneous three times a day before meals  insulin lispro (HumaLOG) corrective regimen sliding scale   SubCutaneous at bedtime  levETIRAcetam 500 milliGRAM(s) Oral two times a day  methimazole 5 milliGRAM(s) Oral daily  pantoprazole    Tablet 40 milliGRAM(s) Oral before breakfast  simvastatin 20 milliGRAM(s) Oral at bedtime      Medications PRN   MEDICATIONS  (PRN):  acetaminophen   Tablet .. 650 milliGRAM(s) Oral every 6 hours PRN Moderate Pain (4 - 6), Severe Pain (7 - 10)  dextrose 40% Gel 15 Gram(s) Oral once PRN Blood Glucose LESS THAN 70 milliGRAM(s)/deciliter  glucagon  Injectable 1 milliGRAM(s) IntraMuscular once PRN Glucose LESS THAN 70 milligrams/deciliter  oxyCODONE    IR 5 milliGRAM(s) Oral every 6 hours PRN Severe Pain (7 - 10)      Physical Exam:   General: NAD   HEENT: EOMI, PEERL, Nl OP, Nl thyroid gland, No lymphadenopathy, No JVD   CVS: RRR, No murmurs/gallops/regurg  Resp: CTA bilaterally, no rhonchi, rales, wheeze  Abd: Nl BS, soft, NT, ND   :   Ext:     LABS   CBC                       9.4    6.80  )-----------( 293      ( 07 Oct 2018 10:15 )             29.8     CMP 10-07    143  |  108  |  16  ----------------------------<  220<H>  3.7   |  24  |  0.96    Ca    8.6      07 Oct 2018 07:41      PT/INR - ( 07 Oct 2018 00:22 )   PT: 11.4 sec;   INR: 1.04 ratio         PTT - ( 07 Oct 2018 00:22 )  PTT:36.8 sec    Radiology:     Assessment	  89 yo F w/ hx of DM2, HTN, OA, spinal stenosis, CKD III, chronic anemia, hyperthyroidism presents after a fall found to have brain lesions.     Problem/Plan - 1:  ·  Problem: Brain lesion.  Plan: -Patient presents with new brain lesions. Seen by neurosx who recommends brain MRI w/ and w/o contrast. Pt has allergies IV contrast but iodinated contrast agents in setting of CT scan. D/w patient and family at bedside who agree with brain MRI despite the risk of gadolinium induced fibrosis and renal damage.   -differential includes mets, less likely HIV or infection (toxo, neurocystericosis etc...)  -hold steroids for now unless symptomatic  -keppra for seizure ppx per neurosx recommendations.      Problem/Plan - 2:  ·  Problem: Fall, initial encounter.  Plan: -Hx c/w mechanical fall, would d/c telemetry  -fall precaution / bed alarm.      Problem/Plan - 3:  ·  Problem: Stage 3 chronic kidney disease.  Plan: -renally dose meds  -avoid nephrotoxins  -family and pt agree w/ mri w/ contrast despite risk of contrast injury.      Problem/Plan - 4:  ·  Problem: Medication management.  Plan: Pharmacy team emailed to verify insulin home dose. Otherwise the other medications are accurate per patient and daughter.      Problem/Plan - 5:  ·  Problem: Anemia, unspecified type.  Plan: -Patient is currently at baseline anemia. No signs of bleeding or hemolysis.      Problem/Plan - 6:  Problem: Leukocytosis, unspecified type. Plan: -This is likely leukemoid rxn, no signs of infection; UA most likely dirty sample as patient is asymptomatic.     Problem/Plan - 7:  ·  Problem: Hand injury, right, initial encounter.  Plan: -xray hand pending, follow up official report.   -neurovascularly intact.      Problem/Plan - 8:  ·  Problem: Type 2 diabetes mellitus without complication, with long-term current use of insulin.  Plan: -clarify home insulin regimen and complete med rec in AM (primary team responsible)  -Will place on low-dose basal bolus insulin therapy for now  -check fsg TID and bedtime  -monitor for hypoglycemia. Patient/surrogate refused vaccine...

## 2019-08-20 NOTE — PATIENT PROFILE ADULT - NSASFALLWHENOCCURRED_GEN_A_NUR
Did not like the pop sicle, but trying some  Apple juice. Thought he had needed to have diarrhea, but he just voided. Dr. Jia Horta offered to give then a \"to-go\" stool kit; they said they would wait a while longer. He is running in the department and playing his computer games. last six months

## 2020-08-25 NOTE — PROGRESS NOTE ADULT - PROBLEM SELECTOR PLAN 1
-Patient presents with new left frontal lobe brain lesions.   - MRI of head showed a 3.3 cm left parasagittal frontal extra-axial mass that is most consistent with a meningioma.   - CT CAP performed yesterday as part of metastatic workup - report pending  -hold steroids for now unless symptomatic; if signs or symptoms of intrcranial pressure can start pt. on decadron 4mg q6hrs   -as per neurosurgery ok to d/c keppra Right LE

## 2020-09-10 NOTE — ED ADULT NURSE NOTE - CARDIO ASSESSMENT
What Type Of Note Output Would You Prefer (Optional)?: Standard Output
What Is The Reason For Today's Visit?: Full Body Skin Examination
What Is The Reason For Today's Visit? (Being Monitored For X): concerning skin lesions on an annual basis
How Severe Are Your Spot(S)?: mild
WDL

## 2021-03-15 ENCOUNTER — EMERGENCY (EMERGENCY)
Facility: HOSPITAL | Age: 87
LOS: 1 days | Discharge: HOME | End: 2021-03-15
Attending: EMERGENCY MEDICINE
Payer: COMMERCIAL

## 2021-03-15 VITALS
OXYGEN SATURATION: 97 % | DIASTOLIC BLOOD PRESSURE: 87 MMHG | SYSTOLIC BLOOD PRESSURE: 200 MMHG | RESPIRATION RATE: 16 BRPM | HEIGHT: 63 IN | HEART RATE: 94 BPM | WEIGHT: 184.97 LBS | TEMPERATURE: 97 F

## 2021-03-15 PROCEDURE — 82962 GLUCOSE BLOOD TEST: CPT

## 2021-03-15 PROCEDURE — 99283 EMERGENCY DEPT VISIT LOW MDM: CPT

## 2021-03-15 NOTE — ED ADULT NURSE NOTE - CAS DISCH CONDITION
Patient Instructions by Alicia Giraldo APN at 08/08/17 09:17 AM     Author:  Alicia Giraldo APN Service:  (none) Author Type:  Nurse Practitioner     Filed:  08/08/17 09:17 AM Encounter Date:  8/8/2017 Status:  Signed     :  Alicia Giraldo APN (Nurse Practitioner)              PATIENT INFORMATION  Follow-Up  - Return for your yearly well child visit.    7-8 years old Health and Safety Tips - The following hyperlinks are available to access via MyChart    Bright Futures 7-8 Years Old Parent Education  RegaloCard Futures 7-8 Years Old Child Education    Futuros Brillantes 7 a 8 años de edad (Educación para los padres) - Español  Futuros Brillantes 7 a 8 años de edad (Educación para los niños) - Español    Additional Educational Resources:  For additional resources regarding your symptoms, diagnosis, or further health information, please visit the Health Resources section on [email or web address] or the Online Health Resources section in [facility].      Revision History        User Key Date/Time User Provider Type Action    > [N/A] 08/08/17 09:17 AM Alicia Giraldo APN Nurse Practitioner Sign            
Stable

## 2021-03-15 NOTE — ED PROVIDER NOTE - NSFOLLOWUPINSTRUCTIONS_ED_ALL_ED_FT
Hypoglycemia in a Person with Diabetes    WHAT YOU NEED TO KNOW:    Hypoglycemia is a serious condition that happens when your blood glucose (sugar) level drops too low. The blood sugar level is usually too high in a person with diabetes, but the level can also drop too low. It is important to follow your diabetes management plan to keep your blood sugar level steady.    DISCHARGE INSTRUCTIONS:    You or someone close to you needs to call the local emergency number (911 in the ) if:   •You have a seizure or pass out.       •Your blood sugar is less than 50 mg/dL and does not respond to treatment.      •You feel you are going to pass out.       •You have trouble thinking clearly.       Call your diabetes care team if:   •You have had symptoms of low blood sugar several times.       •You have questions about the amount of insulin or diabetes medicine you are taking.       •You have questions or concerns about your condition or care.       Medicines:   •Insulin or diabetes medicine help to keep your blood sugar under control.       •Glucagon may be needed if you have severe hypoglycemia.      •Take your medicine as directed. Contact your healthcare provider if you think your medicine is not helping or if you have side effects. Tell him or her if you are allergic to any medicine. Keep a list of the medicines, vitamins, and herbs you take. Include the amounts, and when and why you take them. Bring the list or the pill bottles to follow-up visits. Carry your medicine list with you in case of an emergency.      Manage hypoglycemia:   •Check your blood sugar level right away if you have symptoms of hypoglycemia. Hypoglycemia is usually 70 mg/dL or below. Ask your diabetes care team what blood sugar level is too low for you.      •If your blood sugar level is too low, eat or drink 15 grams of fast-acting carbohydrate. Examples of this amount of fast-acting carbohydrate are 4 ounces (½ cup) of fruit juice or 4 ounces of regular soda. Other examples are 2 tablespoons of raisins or 1 tube of glucose gel.  Ways to Raise Your Blood Sugar     Check your blood sugar level 15 minutes later. Sit still as you wait. If the level is still low (less than 100 mg/dL), have another 15 grams of carbohydrate. When the level returns to 100 mg/dL, eat a meal if it is time. If your meal time is more than 1 hour away, eat a snack. The snack should contain carbohydrates, such as the following: ?3/4 cup of cereal      ?1 cup of skim or low fat milk      ?6 soda crackers      ?1/2 of a turkey sandwich      ?15 fat-free chips  This will help prevent another drop in blood sugar. Always carefully follow your diabetes care team's instructions on how to treat low blood sugar levels.     •Always carry a source of fast-acting carbohydrate. If you have symptoms of hypoglycemia and you do not have a blood glucose meter, have a source of fast-acting carbohydrate anyway. Avoid carbohydrate foods that are high in fat. The fat content may make the carbohydrate take longer to increase your blood sugar level. Ask your diabetes care team if you should carry a glucagon kit. Glucagon is a medicine that is injected when you develop severe hypoglycemia and become unconscious. Check the expiration date every month and replace it before it expires.      •Teach others how to help you if you have symptoms of hypoglycemia. Tell them about the symptoms of hypoglycemia. Ask them to give you a source of fast-acting carbohydrate if you cannot get it yourself. Ask them to give you a glucagon injection if you have signs of hypoglycemia and you become unconscious or have a seizure. Ask them to call the local emergency number (911 in the ). This is an emergency. Tell them never to try to make you swallow anything if you faint or have a seizure.      •Wear medical alert jewelry or carry a card that says you have diabetes. Ask where to get these items.   Medical Alert Jewelry           Prevent hypoglycemia:   •Take diabetes medicine as directed. Take your medicine at the right time and in the right amount. Do not double the amount of medicine you take unless instructed by your diabetes care team.       •Eat regular meals and snacks. Talk to your dietitian or diabetes care team about a meal plan that is right for you. Do not skip meals.      •Check your blood sugar level as directed. Ask your diabetes care team what your blood sugar levels should be before and after you eat. Ask when and how often to check your blood sugar level. You may need to check at least 3 times each day. Record your blood sugar level results and take the record with you when you see your care team. Changes may need to be made to your medicine, food, or exercise schedules using the record.   How to check your blood sugar           •Check your blood sugar level before you exercise. Physical activity, such as exercise, can decrease your blood sugar level. If your blood sugar level is less than 100 mg/dL, have a carbohydrate snack. Examples are 4 to 6 crackers, ½ banana, 8 ounces (1 cup) of nonfat or 1% milk, or 4 ounces (½ cup) of juice. If you will be active for more than 1 hour, you may need to check your blood sugar level every 30 minutes. Your diabetes care team may also recommend that you check your blood sugar level after your activity.      •Know the risks if you choose to drink alcohol. Alcohol can cause your blood sugar levels to be low if you use insulin. Alcohol can cause high blood sugar levels and weight gain if you drink too much. Women 21 years or older and men 65 years or older should limit alcohol to 1 drink a day. Men aged 21 to 64 years should limit alcohol to 2 drinks a day. A drink of alcohol is 12 ounces of beer, 5 ounces of wine, or 1½ ounces of liquor.       Follow up with your diabetes care team or specialist as directed: You may need dose changes to your insulin or oral diabetes medicine if you have hypoglycemia. Write down your questions so you remember to ask them during your visits.        © Copyright [employer] 2021           back to top                          © Copyright [employer] 2021

## 2021-03-15 NOTE — ED ADULT NURSE NOTE - INTERVENTIONS DEFINITIONS
[place] to call system/Call bell, personal items and telephone within reach/Instruct patient to call for assistance/Room bathroom lighting operational/Non-slip footwear when patient is off stretcher/Physically safe environment: no spills, clutter or unnecessary equipment/Stretcher in lowest position, wheels locked, appropriate side rails in place/Monitor gait and stability/Reinforce activity limits and safety measures with patient and family/Provide visual clues: red socks

## 2021-03-15 NOTE — ED PROVIDER NOTE - PATIENT PORTAL LINK FT
You can access the FollowMyHealth Patient Portal offered by [facility] by registering at the following website: [email or web address]. By joining [employer]’s FollowMyHealth portal, you will also be able to view your health information using other applications (apps) compatible with our system.

## 2021-03-15 NOTE — ED ADULT NURSE NOTE - CHIEF COMPLAINT QUOTE
BIBA was found unresponsive and hypoglycemic=23, ems states repeat UK=865, pt presently awake and alert

## 2021-03-15 NOTE — ED ADULT TRIAGE NOTE - CHIEF COMPLAINT QUOTE
BIBA was found unresponsive and hypoglycemic=23, ems states repeat TM=237, pt presently awake and alert

## 2021-03-15 NOTE — ED PROVIDER NOTE - PROGRESS NOTE DETAILS
BG stable and Pt asymptomatic with normal mental status.  Spoke with Pt's daughter she thinks Pt simply did not eat enough, but she is aware to help with appropriate diet and insulin regimen.  Advised strict return precautions and PMD f/u.

## 2021-03-15 NOTE — ED ADULT NURSE NOTE - OBJECTIVE STATEMENT
Pt presents to ED with c/o hypoglycemia. Pt reports she was found unresponsive at home with low blood sugar.

## 2021-03-16 VITALS
HEART RATE: 85 BPM | SYSTOLIC BLOOD PRESSURE: 160 MMHG | RESPIRATION RATE: 18 BRPM | OXYGEN SATURATION: 96 % | DIASTOLIC BLOOD PRESSURE: 89 MMHG | TEMPERATURE: 98 F

## 2021-06-26 ENCOUNTER — EMERGENCY (EMERGENCY)
Facility: HOSPITAL | Age: 87
LOS: 1 days | Discharge: HOME | End: 2021-06-26
Attending: EMERGENCY MEDICINE | Admitting: EMERGENCY MEDICINE
Payer: MEDICARE

## 2021-06-26 VITALS
DIASTOLIC BLOOD PRESSURE: 51 MMHG | HEART RATE: 90 BPM | SYSTOLIC BLOOD PRESSURE: 140 MMHG | HEIGHT: 63 IN | RESPIRATION RATE: 18 BRPM | OXYGEN SATURATION: 100 % | TEMPERATURE: 98 F

## 2021-06-26 LAB
ALBUMIN SERPL ELPH-MCNC: 4.5 G/DL — SIGNIFICANT CHANGE UP (ref 3.3–5)
ALP SERPL-CCNC: 90 U/L — SIGNIFICANT CHANGE UP (ref 40–120)
ALT FLD-CCNC: 14 U/L — SIGNIFICANT CHANGE UP (ref 4–33)
ANION GAP SERPL CALC-SCNC: 15 MMOL/L — HIGH (ref 7–14)
AST SERPL-CCNC: 24 U/L — SIGNIFICANT CHANGE UP (ref 4–32)
BASOPHILS # BLD AUTO: 0.02 K/UL — SIGNIFICANT CHANGE UP (ref 0–0.2)
BASOPHILS NFR BLD AUTO: 0.2 % — SIGNIFICANT CHANGE UP (ref 0–2)
BILIRUB SERPL-MCNC: <0.2 MG/DL — SIGNIFICANT CHANGE UP (ref 0.2–1.2)
BUN SERPL-MCNC: 34 MG/DL — HIGH (ref 7–23)
CALCIUM SERPL-MCNC: 10 MG/DL — SIGNIFICANT CHANGE UP (ref 8.4–10.5)
CHLORIDE SERPL-SCNC: 106 MMOL/L — SIGNIFICANT CHANGE UP (ref 98–107)
CO2 SERPL-SCNC: 21 MMOL/L — LOW (ref 22–31)
CREAT SERPL-MCNC: 1.32 MG/DL — HIGH (ref 0.5–1.3)
EOSINOPHIL # BLD AUTO: 0.01 K/UL — SIGNIFICANT CHANGE UP (ref 0–0.5)
EOSINOPHIL NFR BLD AUTO: 0.1 % — SIGNIFICANT CHANGE UP (ref 0–6)
GLUCOSE SERPL-MCNC: 152 MG/DL — HIGH (ref 70–99)
HCT VFR BLD CALC: 36.6 % — SIGNIFICANT CHANGE UP (ref 34.5–45)
HGB BLD-MCNC: 11.4 G/DL — LOW (ref 11.5–15.5)
IANC: 7.34 K/UL — SIGNIFICANT CHANGE UP (ref 1.5–8.5)
IMM GRANULOCYTES NFR BLD AUTO: 0.2 % — SIGNIFICANT CHANGE UP (ref 0–1.5)
LYMPHOCYTES # BLD AUTO: 0.76 K/UL — LOW (ref 1–3.3)
LYMPHOCYTES # BLD AUTO: 8.9 % — LOW (ref 13–44)
MAGNESIUM SERPL-MCNC: 1.8 MG/DL — SIGNIFICANT CHANGE UP (ref 1.6–2.6)
MCHC RBC-ENTMCNC: 26.1 PG — LOW (ref 27–34)
MCHC RBC-ENTMCNC: 31.1 GM/DL — LOW (ref 32–36)
MCV RBC AUTO: 83.8 FL — SIGNIFICANT CHANGE UP (ref 80–100)
MONOCYTES # BLD AUTO: 0.39 K/UL — SIGNIFICANT CHANGE UP (ref 0–0.9)
MONOCYTES NFR BLD AUTO: 4.6 % — SIGNIFICANT CHANGE UP (ref 2–14)
NEUTROPHILS # BLD AUTO: 7.34 K/UL — SIGNIFICANT CHANGE UP (ref 1.8–7.4)
NEUTROPHILS NFR BLD AUTO: 86 % — HIGH (ref 43–77)
NRBC # BLD: 0 /100 WBCS — SIGNIFICANT CHANGE UP
NRBC # FLD: 0 K/UL — SIGNIFICANT CHANGE UP
PHOSPHATE SERPL-MCNC: 3.6 MG/DL — SIGNIFICANT CHANGE UP (ref 2.5–4.5)
PLATELET # BLD AUTO: 306 K/UL — SIGNIFICANT CHANGE UP (ref 150–400)
POTASSIUM SERPL-MCNC: 4.2 MMOL/L — SIGNIFICANT CHANGE UP (ref 3.5–5.3)
POTASSIUM SERPL-SCNC: 4.2 MMOL/L — SIGNIFICANT CHANGE UP (ref 3.5–5.3)
PROT SERPL-MCNC: 7.8 G/DL — SIGNIFICANT CHANGE UP (ref 6–8.3)
RBC # BLD: 4.37 M/UL — SIGNIFICANT CHANGE UP (ref 3.8–5.2)
RBC # FLD: 14.5 % — SIGNIFICANT CHANGE UP (ref 10.3–14.5)
SODIUM SERPL-SCNC: 142 MMOL/L — SIGNIFICANT CHANGE UP (ref 135–145)
WBC # BLD: 8.54 K/UL — SIGNIFICANT CHANGE UP (ref 3.8–10.5)
WBC # FLD AUTO: 8.54 K/UL — SIGNIFICANT CHANGE UP (ref 3.8–10.5)

## 2021-06-26 PROCEDURE — 99284 EMERGENCY DEPT VISIT MOD MDM: CPT

## 2021-06-26 RX ORDER — SODIUM CHLORIDE 9 MG/ML
500 INJECTION INTRAMUSCULAR; INTRAVENOUS; SUBCUTANEOUS ONCE
Refills: 0 | Status: COMPLETED | OUTPATIENT
Start: 2021-06-26 | End: 2021-06-26

## 2021-06-26 NOTE — ED PROVIDER NOTE - PATIENT PORTAL LINK FT
You can access the FollowMyHealth Patient Portal offered by [facility] by registering at the following website: [email or web address]. By joining [employer]’s FollowMyHealth portal, you will also be able to view your health information using other applications (apps) compatible with our system.

## 2021-06-26 NOTE — ED PROVIDER NOTE - OBJECTIVE STATEMENT
89 y/o F w/ PMH of DM on insulin, HLD, HTN, anemia presenting w/ hypoglycemia. Green room 22. BIBEMS, presents w/ daughter. Reportedly found w/ FS of 21 by EMS, given dextrose, crackers, and juice. Daughter reports around 6:30 pm she went to check on pt and found her unresponsive in bed. Called EMS and they arrived to render care. Pt now back to baseline, feeling well. Reports taking her insulin before breakfast and lunch as usual. Ate entire sandwich for lunch. Ate normal amount. No recent change in insulin. Is confident she took the right amount of insulin and not any extra. Felt well prior to episode this evening. Had hypoglycemic episode in March and was seen at [facility]. Had followed up w/ PCP but reports insulin regiment not changed. Denies fevers, chills, headache, dizziness, blurred vision, chest pain, cough, shortness of breath, abdominal pain, n/v/d/c, urinary symptoms, MSK pain, rash. 89 y/o F w. DM on insulin, HLD, HTN, anemia presenting w/ hypoglycemia. Green room 22. BIBEMS, presents w/ daughter. Reportedly found w/ FS of 21 by EMS, given dextrose, crackers, and juice. Daughter reports around 6:30 pm she went to check on pt and found her unresponsive in bed. Called EMS and they arrived to render care. Pt now back to baseline, feeling well. Reports taking her insulin before breakfast and lunch as usual. Ate entire sandwich for lunch. Ate normal amount. No recent change in insulin. Is confident she took the right amount of insulin and not any extra. Felt well prior to episode this evening. Had hypoglycemic episode in March and was seen at [facility]. Had followed up w/ PCP but reports insulin regiment not changed. Denies fevers, chills, headache, dizziness, blurred vision, chest pain, cough, shortness of breath, abdominal pain, n/v/d/c, urinary symptoms, MSK pain, rash. States she's otherwise well.

## 2021-06-26 NOTE — ED PROVIDER NOTE - PROGRESS NOTE DETAILS
Dr. Jose G Edwards, PGY-3: labs w/ mild XAVIER. Will give fluid bolus and recheck BMP. Explained important of frequent fingersticks at home, tracking BGL levels, and primary care follow up this week. Daughter verbalized understanding. Expect to be able to DC after repeat labs. Dr. Jose G Edwards, PGY-3: rpt BMP w/ Cr improving. FS 81, will give food prior to DC as pt has not eaten since arrival. Again reviewed DC plan w/ pt and daughter. Frequent fingersticks at home w/ PCP follow up this week. Return precautions provided, pt and daughter verbalized understanding. Ready for DC.

## 2021-06-26 NOTE — ED PROVIDER NOTE - CLINICAL SUMMARY MEDICAL DECISION MAKING FREE TEXT BOX
89 y/o F w/ PMH of DM on insulin, HLD, HTN, anemia presenting w/ hypoglycemia. Pt well appearing on exam. FS in ED 82. Unclear exact cause of hypoglycemia if pt taking regularly prescribed insulin. Possible decrease in insulin tolerance as she is elderly. Will eval for possible XAVIER as cause for slower insulin metabolism. Will check basic labs and urine. Will need PCP follow up to discuss insulin regimen. Recommended frequent FS at home to closely monitor BGL. Plan for labs, UA, food. Will reassess the need for additional interventions as clinically warranted. 89 y/o F w/ PMH of DM on insulin, HLD, HTN, anemia presenting w/ hypoglycemia. Pt well appearing on exam. FS in ED 82. Unclear exact cause of hypoglycemia if pt taking regularly prescribed insulin. Possible decrease in insulin need as she is elderly and diet may be changing, less likely medication error though also possible. Will eval for possible XAVIER/metaboli derangements. Will check basic labs and urine. Will need PCP follow up to discuss insulin regimen - this is a second episode.  Will recommend logging blood glucose and very close pmd/endo f/u. Recommended frequent FS at home to closely monitor BGL. Plan for labs, UA, food. Will reassess the need for additional interventions as clinically warranted.

## 2021-06-26 NOTE — ED ADULT NURSE NOTE - OBJECTIVE STATEMENT
Pt presents to RM 22 AO4 ambulatory complaining of hypoglycemia. Pt presents with daughter at [facility] who states "I found my mom real lethargic and not able to talk. Her sugar was 21. The EMTs gave her the sugar shot and her sugar came up to 80 something." Pt denies any current complaints, any n/v/d lightheadedness chest pain or diff breathing. Denies any LOC or falls. Appears to be resting comfortably at this time, provided with juice and sandwiches.

## 2021-06-26 NOTE — ED ADULT TRIAGE NOTE - CHIEF COMPLAINT QUOTE
pt was found unresponsive at 630  with f/s of 21 . EMS gave one  25 amp of dextrose, crackers and OJ. PMH DM ,HTN,

## 2021-06-26 NOTE — ED PROVIDER NOTE - ATTENDING CONTRIBUTION TO CARE
Attending Attestation: Dr. Calloway  I have personally performed a history and physical examination of the patient and discussed management with the resident as well as the patient.  I reviewed the resident's note and agree with the documented findings and plan of care.  I have authored and modified critical sections of the Provider Note, including but not limited to HPI, Physical Exam and MDM. 89 y/o F w/ PMH of DM on insulin, HLD, HTN, anemia presenting w/ hypoglycemia. Pt well appearing on exam. FS in ED 82. Unclear exact cause of hypoglycemia if pt taking regularly prescribed insulin. Possible decrease in insulin need as she is elderly and diet may be changing, less likely medication error though also possible. Will eval for possible XAVIER/metaboli derangements. Will check basic labs and urine. Will need PCP follow up to discuss insulin regimen - this is a second episode.  Will recommend logging blood glucose and very close pmd/endo f/u. Recommended frequent FS at home to closely monitor BGL. Plan for labs, UA, food. Will reassess the need for additional interventions as clinically warranted.

## 2021-06-26 NOTE — ED PROVIDER NOTE - NSFOLLOWUPINSTRUCTIONS_ED_ALL_ED_FT
1) Follow up with your doctor this week. Please call first thing Monday morning to schedule an appointment to be seen.  2) Return to the ED immediately for new or worsening symptoms  3) Please continue to take any home medications as prescribed  4) Your test results from your ED visit were discussed with you prior to discharge  5) You were provided with a copy of your test results  6) Please be sure to check your blood sugar levels frequently, as discussed in ED    Hypoglycemia    Hypoglycemia occurs when the glucose (sugar) level in your blood is too low. Symptoms include confusion, weakness, or fainting. You may even appear to be having a stroke. Take medications exactly as prescribed by your health care professional. Maintain a healthy lifestyle and follow up with your primary care physician.    SEEK IMMEDIATE MEDICAL CARE IF YOU HAVE ANY OF THE FOLLOWING SYMPTOMS: weakness, fainting, change in mental status, nausea or vomiting, fruity smell to your breath, or any signs of dehydration.

## 2021-06-27 VITALS
OXYGEN SATURATION: 100 % | HEART RATE: 72 BPM | RESPIRATION RATE: 17 BRPM | SYSTOLIC BLOOD PRESSURE: 155 MMHG | DIASTOLIC BLOOD PRESSURE: 76 MMHG

## 2021-06-27 LAB
ANION GAP SERPL CALC-SCNC: 14 MMOL/L — SIGNIFICANT CHANGE UP (ref 7–14)
APPEARANCE UR: CLEAR — SIGNIFICANT CHANGE UP
BACTERIA # UR AUTO: ABNORMAL
BILIRUB UR-MCNC: NEGATIVE — SIGNIFICANT CHANGE UP
BUN SERPL-MCNC: 27 MG/DL — HIGH (ref 7–23)
CALCIUM SERPL-MCNC: 9.1 MG/DL — SIGNIFICANT CHANGE UP (ref 8.4–10.5)
CHLORIDE SERPL-SCNC: 108 MMOL/L — HIGH (ref 98–107)
CO2 SERPL-SCNC: 23 MMOL/L — SIGNIFICANT CHANGE UP (ref 22–31)
COLOR SPEC: SIGNIFICANT CHANGE UP
CREAT SERPL-MCNC: 1.07 MG/DL — SIGNIFICANT CHANGE UP (ref 0.5–1.3)
DIFF PNL FLD: NEGATIVE — SIGNIFICANT CHANGE UP
EPI CELLS # UR: 4 /HPF — SIGNIFICANT CHANGE UP (ref 0–5)
GLUCOSE SERPL-MCNC: 104 MG/DL — HIGH (ref 70–99)
GLUCOSE UR QL: NEGATIVE — SIGNIFICANT CHANGE UP
KETONES UR-MCNC: NEGATIVE — SIGNIFICANT CHANGE UP
LEUKOCYTE ESTERASE UR-ACNC: ABNORMAL
NITRITE UR-MCNC: NEGATIVE — SIGNIFICANT CHANGE UP
PH UR: 6.5 — SIGNIFICANT CHANGE UP (ref 5–8)
POTASSIUM SERPL-MCNC: 3.6 MMOL/L — SIGNIFICANT CHANGE UP (ref 3.5–5.3)
POTASSIUM SERPL-SCNC: 3.6 MMOL/L — SIGNIFICANT CHANGE UP (ref 3.5–5.3)
PROT UR-MCNC: ABNORMAL
RBC CASTS # UR COMP ASSIST: 1 /HPF — SIGNIFICANT CHANGE UP (ref 0–4)
SODIUM SERPL-SCNC: 145 MMOL/L — SIGNIFICANT CHANGE UP (ref 135–145)
SP GR SPEC: 1.02 — SIGNIFICANT CHANGE UP (ref 1.01–1.02)
UROBILINOGEN FLD QL: SIGNIFICANT CHANGE UP
WBC UR QL: 8 /HPF — HIGH (ref 0–5)

## 2021-06-27 RX ADMIN — SODIUM CHLORIDE 500 MILLILITER(S): 9 INJECTION INTRAMUSCULAR; INTRAVENOUS; SUBCUTANEOUS at 00:12

## 2022-02-26 ENCOUNTER — EMERGENCY (EMERGENCY)
Facility: HOSPITAL | Age: 88
LOS: 1 days | Discharge: HOME | End: 2022-02-26
Attending: EMERGENCY MEDICINE
Payer: COMMERCIAL

## 2022-02-26 VITALS
HEART RATE: 96 BPM | TEMPERATURE: 98 F | RESPIRATION RATE: 18 BRPM | DIASTOLIC BLOOD PRESSURE: 98 MMHG | WEIGHT: 179.9 LBS | SYSTOLIC BLOOD PRESSURE: 192 MMHG | HEIGHT: 63 IN | OXYGEN SATURATION: 98 %

## 2022-02-26 VITALS
DIASTOLIC BLOOD PRESSURE: 78 MMHG | RESPIRATION RATE: 18 BRPM | OXYGEN SATURATION: 98 % | SYSTOLIC BLOOD PRESSURE: 167 MMHG | HEART RATE: 99 BPM | TEMPERATURE: 98 F

## 2022-02-26 LAB
ALBUMIN SERPL ELPH-MCNC: 3.6 G/DL — SIGNIFICANT CHANGE UP (ref 3.5–5)
ALP SERPL-CCNC: 83 U/L — SIGNIFICANT CHANGE UP (ref 40–120)
ALT FLD-CCNC: 23 U/L DA — SIGNIFICANT CHANGE UP (ref 10–60)
ANION GAP SERPL CALC-SCNC: 5 MMOL/L — SIGNIFICANT CHANGE UP (ref 5–17)
AST SERPL-CCNC: 21 U/L — SIGNIFICANT CHANGE UP (ref 10–40)
BASOPHILS # BLD AUTO: 0.03 K/UL — SIGNIFICANT CHANGE UP (ref 0–0.2)
BASOPHILS NFR BLD AUTO: 0.3 % — SIGNIFICANT CHANGE UP (ref 0–2)
BILIRUB SERPL-MCNC: 0.2 MG/DL — SIGNIFICANT CHANGE UP (ref 0.2–1.2)
BUN SERPL-MCNC: 35 MG/DL — HIGH (ref 7–18)
CALCIUM SERPL-MCNC: 9.1 MG/DL — SIGNIFICANT CHANGE UP (ref 8.4–10.5)
CHLORIDE SERPL-SCNC: 109 MMOL/L — HIGH (ref 96–108)
CO2 SERPL-SCNC: 26 MMOL/L — SIGNIFICANT CHANGE UP (ref 22–31)
CREAT SERPL-MCNC: 1.33 MG/DL — HIGH (ref 0.5–1.3)
EOSINOPHIL # BLD AUTO: 0.02 K/UL — SIGNIFICANT CHANGE UP (ref 0–0.5)
EOSINOPHIL NFR BLD AUTO: 0.2 % — SIGNIFICANT CHANGE UP (ref 0–6)
GLUCOSE SERPL-MCNC: 115 MG/DL — HIGH (ref 70–99)
HCT VFR BLD CALC: 36.7 % — SIGNIFICANT CHANGE UP (ref 34.5–45)
HGB BLD-MCNC: 11.2 G/DL — LOW (ref 11.5–15.5)
IMM GRANULOCYTES NFR BLD AUTO: 0.1 % — SIGNIFICANT CHANGE UP (ref 0–1.5)
LYMPHOCYTES # BLD AUTO: 0.97 K/UL — LOW (ref 1–3.3)
LYMPHOCYTES # BLD AUTO: 11 % — LOW (ref 13–44)
MCHC RBC-ENTMCNC: 25.9 PG — LOW (ref 27–34)
MCHC RBC-ENTMCNC: 30.5 GM/DL — LOW (ref 32–36)
MCV RBC AUTO: 85 FL — SIGNIFICANT CHANGE UP (ref 80–100)
MONOCYTES # BLD AUTO: 0.65 K/UL — SIGNIFICANT CHANGE UP (ref 0–0.9)
MONOCYTES NFR BLD AUTO: 7.4 % — SIGNIFICANT CHANGE UP (ref 2–14)
NEUTROPHILS # BLD AUTO: 7.11 K/UL — SIGNIFICANT CHANGE UP (ref 1.8–7.4)
NEUTROPHILS NFR BLD AUTO: 81 % — HIGH (ref 43–77)
NRBC # BLD: 0 /100 WBCS — SIGNIFICANT CHANGE UP (ref 0–0)
PLATELET # BLD AUTO: 287 K/UL — SIGNIFICANT CHANGE UP (ref 150–400)
POTASSIUM SERPL-MCNC: 4.8 MMOL/L — SIGNIFICANT CHANGE UP (ref 3.5–5.3)
POTASSIUM SERPL-SCNC: 4.8 MMOL/L — SIGNIFICANT CHANGE UP (ref 3.5–5.3)
PROT SERPL-MCNC: 7.8 G/DL — SIGNIFICANT CHANGE UP (ref 6–8.3)
RBC # BLD: 4.32 M/UL — SIGNIFICANT CHANGE UP (ref 3.8–5.2)
RBC # FLD: 14.4 % — SIGNIFICANT CHANGE UP (ref 10.3–14.5)
SODIUM SERPL-SCNC: 140 MMOL/L — SIGNIFICANT CHANGE UP (ref 135–145)
WBC # BLD: 8.79 K/UL — SIGNIFICANT CHANGE UP (ref 3.8–10.5)
WBC # FLD AUTO: 8.79 K/UL — SIGNIFICANT CHANGE UP (ref 3.8–10.5)

## 2022-02-26 PROCEDURE — 99284 EMERGENCY DEPT VISIT MOD MDM: CPT

## 2022-02-26 PROCEDURE — 93005 ELECTROCARDIOGRAM TRACING: CPT

## 2022-02-26 PROCEDURE — 99283 EMERGENCY DEPT VISIT LOW MDM: CPT

## 2022-02-26 PROCEDURE — 82962 GLUCOSE BLOOD TEST: CPT

## 2022-02-26 PROCEDURE — 80053 COMPREHEN METABOLIC PANEL: CPT

## 2022-02-26 PROCEDURE — 36415 COLL VENOUS BLD VENIPUNCTURE: CPT

## 2022-02-26 PROCEDURE — 85025 COMPLETE CBC W/AUTO DIFF WBC: CPT

## 2022-02-26 PROCEDURE — 93010 ELECTROCARDIOGRAM REPORT: CPT

## 2022-02-26 NOTE — ED PROVIDER NOTE - NSFOLLOWUPINSTRUCTIONS_ED_ALL_ED_FT
HYPOGLYCEMIA IN A PERSON WITH DIABETES - [facility](R) Instructions(ER/ED)           Hypoglycemia in a Person with Diabetes    WHAT YOU NEED TO KNOW:    Hypoglycemia is a serious condition that happens when your blood glucose (sugar) level drops too low. The blood sugar level is usually too high in a person with diabetes, but the level can also drop too low. It is important to follow your diabetes management plan to keep your blood sugar level steady.    DISCHARGE INSTRUCTIONS:    Have someone call your local emergency number (911 in the [place]) if:   •You have a seizure or pass out.      •Your blood sugar is less than 50 mg/dL and does not respond to treatment.      •You feel you are going to pass out.      •You have trouble thinking clearly.      Call your doctor or diabetes care team if:   •You have had symptoms of low blood sugar several times.      •You have questions about the amount of insulin or diabetes medicine you are taking.      •You have questions or concerns about your condition or care.      Medicines:   •Insulin or diabetes medicine help to keep your blood sugar under control.      •Glucagon may be needed if you have severe hypoglycemia.      •Take your medicine as directed. Contact your healthcare provider if you think your medicine is not helping or if you have side effects. Tell him or her if you are allergic to any medicine. Keep a list of the medicines, vitamins, and herbs you take. Include the amounts, and when and why you take them. Bring the list or the pill bottles to follow-up visits. Carry your medicine list with you in case of an emergency.      Manage hypoglycemia:   •Check your blood sugar level right away if you have symptoms of hypoglycemia. Hypoglycemia usually happens when your blood sugar level is 70 mg/dL or below. Ask your diabetes care team what blood sugar level is too low for you.      •If your blood sugar level is too low, eat or drink 15 grams of fast-acting carbohydrate. Examples of this amount of fast-acting carbohydrate are 4 ounces (½ cup) of fruit juice or 4 ounces of regular soda. Other examples are 2 tablespoons of raisins or 1 tube of glucose gel.  Ways to Raise Your Blood Sugar     Check your blood sugar level 15 minutes later. Sit still as you wait. If the level is still low (less than 100 mg/dL), have another 15 grams of carbohydrate. When the level returns to 100 mg/dL, eat a meal if it is time. If your meal time is more than 1 hour away, eat a snack. The snack should contain carbohydrates, such as the following:?3/4 cup of cereal      ?1 cup of skim or low fat milk      ?6 soda crackers      ?1/2 of a turkey sandwich      ?15 fat-free chips  This will help prevent another drop in blood sugar. Always carefully follow your diabetes care team's instructions on how to treat low blood sugar levels.    •Always carry a source of fast-acting carbohydrate. If you have symptoms of hypoglycemia and you do not have a blood glucose meter, have a source of fast-acting carbohydrate anyway. Avoid carbohydrate foods that are high in fat. The fat content may make the carbohydrate take longer to increase your blood sugar level. Ask your diabetes care team if you should carry a glucagon kit. Glucagon is a medicine that is injected when you develop severe hypoglycemia and become unconscious. Check the expiration date every month and replace it before it expires.      •Teach others how to help you if you have symptoms of hypoglycemia. Tell them about the symptoms of hypoglycemia. Ask them to give you a source of fast-acting carbohydrate if you cannot get it yourself. Ask them to give you a glucagon injection if you have signs of hypoglycemia and you become unconscious or have a seizure. Ask them to call the local emergency number (911 in the [place]). This is an emergency. Tell them never to try to make you swallow anything if you faint or have a seizure.      •Wear medical alert jewelry or carry a card that says you have diabetes. Ask where to get these items.  Medical Alert Jewelry           Prevent hypoglycemia:   •Take diabetes medicine as directed. Take your medicine at the right time and in the right amount. Do not double the amount of medicine you take unless instructed by your diabetes care team. You may need oral diabetes medicine, insulin, or both to help control your blood sugar levels. Your healthcare provider will teach you how and when to take oral diabetes medicine. You will also be taught about side effects oral diabetes medicine can cause. Insulin may be added if oral diabetes medicine becomes less effective over time. Insulin may be injected, or given through a pump or pen. You and your care team will discuss which method is best for you.?An insulin pump is an implanted device that gives your insulin 24 hours a day. An insulin pump prevents the need for multiple insulin injections in a day.             ?An insulin pen is a device prefilled with the right amount of insulin.  Insulin Pen            •Eat meals and snacks as directed. Talk to your dietitian or diabetes care team about a meal plan that is right for you. Do not skip meals.  Plate Method           •Check your blood sugar level as directed. Ask your diabetes care team what your blood sugar levels should be before and after you eat. Ask when and how often to check your blood sugar level. You may need to check a drop of blood in a glucose test machine. You may need to check at least 3 times each day. Record your blood sugar level results and take the record with you when you see your care team. They may use it to make changes to your medicine, food, or exercise schedules. Your care team provider may recommend a continuous glucose monitor (CGM). A CGM is a device that is worn at all times. The CGM checks your blood sugar every 5 minutes. It sends results to an electronic device such as a smart phone.  How to check your blood sugar       Continuous Glucose Monitoring            •Check your blood sugar level before you exercise. Physical activity, such as exercise, can decrease your blood sugar level. If your blood sugar level is less than 100 mg/dL, have a carbohydrate snack. Examples are 4 to 6 crackers, ½ banana, 8 ounces (1 cup) of nonfat or 1% milk, or 4 ounces (½ cup) of juice. If you will be active for more than 1 hour, you may need to check your blood sugar level every 30 minutes. Your diabetes care team may also recommend that you check your blood sugar level after your activity.      •Know the risks if you choose to drink alcohol. Alcohol can cause your blood sugar levels to be low if you use insulin. Alcohol can cause high blood sugar levels and weight gain if you drink too much. Women 21 years or older and men 65 years or older should limit alcohol to 1 drink a day. Men aged 21 to 64 years should limit alcohol to 2 drinks a day. A drink of alcohol is 12 ounces of beer, 5 ounces of wine, or 1½ ounces of liquor.      Follow up with your doctor or diabetes care team or specialist as directed: You may need your insulin or diabetes medicine changed if you continue to have hypoglycemia episodes. Write down your questions so you remember to ask them during your visits.       © Copyright [employer] 2022           back to top                          © Copyright [employer] 2022

## 2022-02-26 NOTE — ED ADULT NURSE NOTE - NSIMPLEMENTINTERV_GEN_ALL_ED
Implemented All Fall with Harm Risk Interventions:  [place] to call system. Call bell, personal items and telephone within reach. Instruct patient to call for assistance. Room bathroom lighting operational. Non-slip footwear when patient is off stretcher. Physically safe environment: no spills, clutter or unnecessary equipment. Stretcher in lowest position, wheels locked, appropriate side rails in place. Provide visual cue, wrist band, yellow gown, etc. Monitor gait and stability. Monitor for mental status changes and reorient to person, place, and time. Review medications for side effects contributing to fall risk. Reinforce activity limits and safety measures with patient and family. Provide visual clues: red socks.

## 2022-02-26 NOTE — ED PROVIDER NOTE - NSICDXPASTMEDICALHX_GEN_ALL_CORE_FT
PAST MEDICAL HISTORY:  Anemia     Anxiety     Arthritis     Cataract     Cervical spinal stenosis     Diabetes mellitus Type 2 IDDM    DUB (dysfunctional uterine bleeding) hysterectomy age 30's    Dyslipidemia     HTN (hypertension)     Obesity     Spinal stenosis     Spinal stenosis, lumbar

## 2022-02-26 NOTE — ED PROVIDER NOTE - PROGRESS NOTE DETAILS
Pt observed and improved, BG stable.  Advised strict return precautions and PMD f/u.  To go home with family.

## 2022-02-26 NOTE — ED PROVIDER NOTE - PATIENT PORTAL LINK FT
You can access the FollowMyHealth Patient Portal offered by [facility] by registering at the following website: [email or web address]. By joining [employer]’s FollowMyHealth portal, you will also be able to view your health information using other applications (apps) compatible with our system.

## 2022-02-26 NOTE — ED ADULT TRIAGE NOTE - BP NONINVASIVE DIASTOLIC (MM HG)
98 Breathing spontaneous and unlabored. Breath sounds clear and equal bilaterally with regular rhythm.

## 2022-02-26 NOTE — ED PROVIDER NOTE - NEUROLOGICAL, MLM
Alert and oriented, no focal deficits, no motor or sensory deficits. Mental status intact. Alert and oriented, no focal deficits, no motor or sensory deficits.

## 2022-02-26 NOTE — ED ADULT NURSE REASSESSMENT NOTE - NS ED NURSE REASSESS COMMENT FT1
Pt is alert and oriented. Family is at bedside. Pt endorsed desire of going home. Pt reports she is feeling better. No acute distress noted. MD made aware.

## 2022-02-26 NOTE — ED PROVIDER NOTE - OBJECTIVE STATEMENT
89 year old female with a PHMx of anemia, HTN, DM and anxiety presents to the ED brought by EMS from home for hypoglycemia. Fingerstick glucose on scene was 20. Paramedics treated with 25g D50 IV and reported glucose came up to 160 after that. Patient does use insulin and reports no recollection. Denies any symptoms at this time. States she ate today but does not know what time.   Allergies: Codiene, Penicillin, Aspirin, IV Contrast, Vancomycin, Iodine

## 2022-02-26 NOTE — ED ADULT TRIAGE NOTE - CCCP TRG CHIEF CMPLNT
Ventricular Rate : 104  Atrial Rate : 105  P-R Interval : 155  QRS Duration : 77  Q-T Interval : 341  QTC Calculation(Bazett) : 449  P Axis : 63  R Axis : 22  T Axis : 58  Diagnosis : Sinus tachycardia  Abnormal R-wave progression, early transition    Confirmed by Grzegorz Adorno ([id number]) on 11/23/2019 1:08:42 PM  
hypoglycemia

## 2022-02-26 NOTE — ED ADULT TRIAGE NOTE - CHIEF COMPLAINT QUOTE
Hypoglycemic ,f/s was 20 on scene ,daughter reported patient self administers insulin in empty stomach ,received 25 gm of d-50 1vp ,f.s 160 post glucose administration per ems

## 2022-03-02 ENCOUNTER — EMERGENCY (EMERGENCY)
Facility: HOSPITAL | Age: 88
LOS: 1 days | Discharge: HOME | End: 2022-03-02
Attending: EMERGENCY MEDICINE
Payer: COMMERCIAL

## 2022-03-02 VITALS
HEART RATE: 90 BPM | RESPIRATION RATE: 18 BRPM | WEIGHT: 199.96 LBS | TEMPERATURE: 99 F | SYSTOLIC BLOOD PRESSURE: 228 MMHG | HEIGHT: 63 IN | OXYGEN SATURATION: 98 % | DIASTOLIC BLOOD PRESSURE: 90 MMHG

## 2022-03-02 VITALS
RESPIRATION RATE: 18 BRPM | TEMPERATURE: 98 F | DIASTOLIC BLOOD PRESSURE: 94 MMHG | HEART RATE: 89 BPM | OXYGEN SATURATION: 98 % | SYSTOLIC BLOOD PRESSURE: 171 MMHG

## 2022-03-02 PROCEDURE — 93005 ELECTROCARDIOGRAM TRACING: CPT

## 2022-03-02 PROCEDURE — 99283 EMERGENCY DEPT VISIT LOW MDM: CPT

## 2022-03-02 PROCEDURE — 82962 GLUCOSE BLOOD TEST: CPT

## 2022-03-02 PROCEDURE — 99284 EMERGENCY DEPT VISIT MOD MDM: CPT

## 2022-03-02 NOTE — ED PROVIDER NOTE - CLINICAL SUMMARY MEDICAL DECISION MAKING FREE TEXT BOX
89 y/o woman, h/o DM, HTN, BIBA from home for hypoglycemia, reportedly as low as 37, and EMS gave D50 prior to arrival with improvement in mental status and BG came up.  Pt now feels better, and at normal baseline per daughter.  She denies use of oral hypoglycemics--BG stable and BP improved spontaneously to safer level, will D/C with daughter with recommendations for prevention and f/u with endocrinologist to discuss adjustments in her insulin regimen.

## 2022-03-02 NOTE — ED PROVIDER NOTE - PROGRESS NOTE DETAILS
Pt improved, BG stable and BP improved spontaneously. Advised strict return precautions and PMD f/u. Pt improved, BG stable and BP improved spontaneously. Advised strict return precautions and endocrine/PMD f/u.

## 2022-03-02 NOTE — ED PROVIDER NOTE - PATIENT PORTAL LINK FT
You can access the FollowMyHealth Patient Portal offered by [facility] by registering at the following website: [email or web address]. By joining [employer]’s FollowMyHealth portal, you will also be able to view your health information using other applications (apps) compatible with our system.

## 2022-03-02 NOTE — ED PROVIDER NOTE - NSFOLLOWUPINSTRUCTIONS_ED_ALL_ED_FT
Preventing Hypoglycemia      Hypoglycemia occurs when the level of sugar (glucose) in the blood is too low. Hypoglycemia can happen in people who do or do not have diabetes (diabetes mellitus). It can develop quickly, and it can be a medical emergency. For most people with diabetes, a blood glucose level below 70 mg/dL (3.9 mmol/L) is considered hypoglycemia.    Glucose is a type of sugar that provides the body's main source of energy. Certain hormones (insulin and glucagon) control the level of glucose in the blood. Insulin lowers blood glucose, and glucagon increases blood glucose. Hypoglycemia can result from having too much insulin in the bloodstream, or from not eating enough food that contains glucose.    Your risk for hypoglycemia is higher:  •If you take insulin or diabetes medicines to help lower your blood glucose or help your body make more insulin.      •If you skip or delay a meal or snack.      •If you are ill.      •During and after exercise.      You can prevent hypoglycemia by working with your health care provider to adjust your meal plan as needed and by taking other precautions.      How can hypoglycemia affect me?    Mild symptoms     Mild hypoglycemia may not cause any symptoms. If you do have symptoms, they may include:  •Hunger.      •Anxiety.      •Sweating and feeling clammy.      •Dizziness or feeling light-headed.      •Sleepiness.      •Nausea.      •Increased heart rate.      •Headache.      •Blurry vision.      •Irritability.      •Tingling or numbness around the mouth, lips, or tongue.      •A change in coordination.      •Restless sleep.      If mild hypoglycemia is not recognized and treated, it can quickly become moderate or severe hypoglycemia.    Moderate symptoms    Moderate hypoglycemia can cause:  •Mental confusion and poor judgment.      •Behavior changes.      •Weakness.      •Irregular heartbeat.      Severe symptoms    Severe hypoglycemia is a medical emergency. It can cause:  •Fainting.      •Seizures.      •Loss of consciousness (coma).      •Death.        What nutrition changes can be made?    •Work with your health care provider or diet and nutrition specialist (dietitian) to make a healthy meal plan that is right for you. Follow your meal plan carefully.      •Eat meals at regular times.      •If recommended by your health care provider, have snacks between meals.      • Do not skip or delay meals or snacks. You can be at risk for hypoglycemia if you are not getting enough carbohydrates.        What lifestyle changes can be made?   •Work closely with your health care provider to manage your blood glucose. Make sure you know:  •Your goal blood glucose levels.      •How and when to check your blood glucose.      •The symptoms of hypoglycemia. It is important to treat it right away to keep it from becoming severe.        • Do not drink alcohol on an empty stomach.      •When you are ill, check your blood glucose more often than usual. Follow your sick day plan whenever you cannot eat or drink normally. Make this plan in advance with your health care provider.      •Always check your blood glucose before, during, and after exercise.        How is this treated?    This condition can often be treated by immediately eating or drinking something that contains sugar, such as:  •Fruit juice, 4–6 oz (120–150 mL).      •Regular (not diet) soda, 4–6 oz (120–150 mL).      •Low-fat milk, 4 oz (120 mL).      •Several pieces of hard candy.      •Sugar or honey, 1 Tbsp (15 mL).      Treating hypoglycemia if you have diabetes    If you are alert and able to swallow safely, follow the 15:15 rule:  •Take 15 grams of a rapid-acting carbohydrate. Talk with your health care provider about how much you should take.    •Rapid-acting options include:  •Glucose pills (take 15 grams).      •6–8 pieces of hard candy.      •4–6 oz (120–150 mL) of fruit juice.      •4–6 oz (120–150 mL) of regular (not diet) soda.        •Check your blood glucose 15 minutes after you take the carbohydrate.      •If the repeat blood glucose level is still at or below 70 mg/dL (3.9 mmol/L), take 15 grams of a carbohydrate again.      •If your blood glucose level does not increase above 70 mg/dL (3.9 mmol/L) after 3 tries, seek emergency medical care.      •After your blood glucose level returns to normal, eat a meal or a snack within 1 hour.      Treating severe hypoglycemia    Severe hypoglycemia is when your blood glucose level is at or below 54 mg/dL (3 mmol/L). Severe hypoglycemia is a medical emergency. Get medical help right away.    If you have severe hypoglycemia and you cannot eat or drink, you may need an injection of glucagon. A family member or close friend should learn how to check your blood glucose and how to give you a glucagon injection. Ask your health care provider if you need to have an emergency glucagon injection kit available.    Severe hypoglycemia may need to be treated in a hospital. The treatment may include getting glucose through an IV. You may also need treatment for the cause of your hypoglycemia.      Where to find more information    •American Diabetes Association: www.diabetes.org      •National [place] of Diabetes and Digestive and Kidney Diseases: www.niddk.nih.gov        Contact a health care provider if:    •You have problems keeping your blood glucose in your target range.      •You have frequent episodes of hypoglycemia.        Get help right away if:    •You continue to have hypoglycemia symptoms after eating or drinking something containing glucose.      •Your blood glucose level is at or below 54 mg/dL (3 mmol/L).      •You faint.      •You have a seizure.      These symptoms may represent a serious problem that is an emergency. Do not wait to see if the symptoms will go away. Get medical help right away. Call your local emergency services (911 in the [place]       Summary    •Know the symptoms of hypoglycemia, and when you are at risk for it (such as during exercise or when you are sick). Check your blood glucose often when you are at risk for hypoglycemia.      •Hypoglycemia can develop quickly, and it can be dangerous if it is not treated right away. If you have a history of severe hypoglycemia, make sure you know how to use your glucagon injection kit.      •Make sure you know how to treat hypoglycemia. Keep a carbohydrate snack available when you may be at risk for hypoglycemia.      This information is not intended to replace advice given to you by your health care provider. Make sure you discuss any questions you have with your health care provider.      Document Revised: 04/10/2020 Document Reviewed: 08/15/2018    [employer] Patient Education © 2021 [employer]           HYPOGLYCEMIA IN A PERSON WITH DIABETES - AfterCare(R) Instructions(ER/ED)           Hypoglycemia in a Person with Diabetes    WHAT YOU NEED TO KNOW:    Hypoglycemia is a serious condition that happens when your blood glucose (sugar) level drops too low. The blood sugar level is usually too high in a person with diabetes, but the level can also drop too low. It is important to follow your diabetes management plan to keep your blood sugar level steady.    DISCHARGE INSTRUCTIONS:    Have someone call your local emergency number (911 in the [place]) if:   •You have a seizure or pass out.      •Your blood sugar is less than 50 mg/dL and does not respond to treatment.      •You feel you are going to pass out.      •You have trouble thinking clearly.      Call your doctor or diabetes care team if:   •You have had symptoms of low blood sugar several times.      •You have questions about the amount of insulin or diabetes medicine you are taking.      •You have questions or concerns about your condition or care.      Medicines:   •Insulin or diabetes medicine help to keep your blood sugar under control.      •Glucagon may be needed if you have severe hypoglycemia.      •Take your medicine as directed. Contact your healthcare provider if you think your medicine is not helping or if you have side effects. Tell him or her if you are allergic to any medicine. Keep a list of the medicines, vitamins, and herbs you take. Include the amounts, and when and why you take them. Bring the list or the pill bottles to follow-up visits. Carry your medicine list with you in case of an emergency.      Manage hypoglycemia:   •Check your blood sugar level right away if you have symptoms of hypoglycemia. Hypoglycemia usually happens when your blood sugar level is 70 mg/dL or below. Ask your diabetes care team what blood sugar level is too low for you.      •If your blood sugar level is too low, eat or drink 15 grams of fast-acting carbohydrate. Examples of this amount of fast-acting carbohydrate are 4 ounces (½ cup) of fruit juice or 4 ounces of regular soda. Other examples are 2 tablespoons of raisins or 1 tube of glucose gel.  Ways to Raise Your Blood Sugar     Check your blood sugar level 15 minutes later. Sit still as you wait. If the level is still low (less than 100 mg/dL), have another 15 grams of carbohydrate. When the level returns to 100 mg/dL, eat a meal if it is time. If your meal time is more than 1 hour away, eat a snack. The snack should contain carbohydrates, such as the following:?3/4 cup of cereal      ?1 cup of skim or low fat milk      ?6 soda crackers      ?1/2 of a turkey sandwich      ?15 fat-free chips  This will help prevent another drop in blood sugar. Always carefully follow your diabetes care team's instructions on how to treat low blood sugar levels.    •Always carry a source of fast-acting carbohydrate. If you have symptoms of hypoglycemia and you do not have a blood glucose meter, have a source of fast-acting carbohydrate anyway. Avoid carbohydrate foods that are high in fat. The fat content may make the carbohydrate take longer to increase your blood sugar level. Ask your diabetes care team if you should carry a glucagon kit. Glucagon is a medicine that is injected when you develop severe hypoglycemia and become unconscious. Check the expiration date every month and replace it before it expires.      •Teach others how to help you if you have symptoms of hypoglycemia. Tell them about the symptoms of hypoglycemia. Ask them to give you a source of fast-acting carbohydrate if you cannot get it yourself. Ask them to give you a glucagon injection if you have signs of hypoglycemia and you become unconscious or have a seizure. Ask them to call the local emergency number (911 in the [place]). This is an emergency. Tell them never to try to make you swallow anything if you faint or have a seizure.      •Wear medical alert jewelry or carry a card that says you have diabetes. Ask where to get these items.  Medical Alert Jewelry           Prevent hypoglycemia:   •Take diabetes medicine as directed. Take your medicine at the right time and in the right amount. Do not double the amount of medicine you take unless instructed by your diabetes care team. You may need oral diabetes medicine, insulin, or both to help control your blood sugar levels. Your healthcare provider will teach you how and when to take oral diabetes medicine. You will also be taught about side effects oral diabetes medicine can cause. Insulin may be added if oral diabetes medicine becomes less effective over time. Insulin may be injected, or given through a pump or pen. You and your care team will discuss which method is best for you.?An insulin pump is an implanted device that gives your insulin 24 hours a day. An insulin pump prevents the need for multiple insulin injections in a day.             ?An insulin pen is a device prefilled with the right amount of insulin.  Insulin Pen            •Eat meals and snacks as directed. Talk to your dietitian or diabetes care team about a meal plan that is right for you. Do not skip meals.  Plate Method           •Check your blood sugar level as directed. Ask your diabetes care team what your blood sugar levels should be before and after you eat. Ask when and how often to check your blood sugar level. You may need to check a drop of blood in a glucose test machine. You may need to check at least 3 times each day. Record your blood sugar level results and take the record with you when you see your care team. They may use it to make changes to your medicine, food, or exercise schedules. Your care team provider may recommend a continuous glucose monitor (CGM). A CGM is a device that is worn at all times. The CGM checks your blood sugar every 5 minutes. It sends results to an electronic device such as a smart phone.  How to check your blood sugar       Continuous Glucose Monitoring            •Check your blood sugar level before you exercise. Physical activity, such as exercise, can decrease your blood sugar level. If your blood sugar level is less than 100 mg/dL, have a carbohydrate snack. Examples are 4 to 6 crackers, ½ banana, 8 ounces (1 cup) of nonfat or 1% milk, or 4 ounces (½ cup) of juice. If you will be active for more than 1 hour, you may need to check your blood sugar level every 30 minutes. Your diabetes care team may also recommend that you check your blood sugar level after your activity.      •Know the risks if you choose to drink alcohol. Alcohol can cause your blood sugar levels to be low if you use insulin. Alcohol can cause high blood sugar levels and weight gain if you drink too much. Women 21 years or older and men 65 years or older should limit alcohol to 1 drink a day. Men aged 21 to 64 years should limit alcohol to 2 drinks a day. A drink of alcohol is 12 ounces of beer, 5 ounces of wine, or 1½ ounces of liquor.      Follow up with your doctor or diabetes care team or specialist as directed: You may need your insulin or diabetes medicine changed if you continue to have hypoglycemia episodes. Write down your questions so you remember to ask them during your visits.       © Copyright [employer] 2022           back to top                          © Copyright [employer] 2022                                 Log Out.      [facility]® CareNotes®     :  [facility]  	                       HYPERTENSION - AfterCare(R) Instructions(ER/ED)           Hypertension    WHAT YOU NEED TO KNOW:    Hypertension is high blood pressure. Your blood pressure is the force of your blood moving against the walls of your arteries. Hypertension causes your blood pressure to get so high that your heart has to work much harder than normal. This can damage your heart. The cause of hypertension may not be known. This is called essential or primary hypertension. Hypertension caused by another medical condition, such as kidney disease, is called secondary hypertension.    DISCHARGE INSTRUCTIONS:    Call your local emergency number (911 in the [place]) or have someone call if:   •You have chest pain.      •You have any of the following signs of a heart attack: ?Squeezing, pressure, or pain in your chest      ?You may also have any of the following: ?Discomfort or pain in your back, neck, jaw, stomach, or arm      ?Shortness of breath      ?Nausea or vomiting      ?Lightheadedness or a sudden cold sweat        •You become confused or have trouble speaking.      •You suddenly feel lightheaded or have trouble breathing.      Return to the emergency department if:   •You have a severe headache or vision loss.      •You have weakness in an arm or leg.      Call your doctor or cardiologist if:   •You feel faint, dizzy, confused, or drowsy.      •You have been taking your blood pressure medicine but your pressure is higher than your provider says it should be.      •You have questions or concerns about your condition or care.      Medicines: You may need any of the following:  •Antihypertensives may be used to help lower your blood pressure. Several kinds of medicines are available. Your healthcare provider will choose medicines based on the kind of hypertension you have. You may need more than one type of medicine. Take the medicine exactly as directed.      •Diuretics help decrease extra fluid that collects in your body. This will help lower your blood pressure. You may urinate more often while you take this medicine.      •Cholesterol medicine helps lower your cholesterol level. A low cholesterol level helps prevent heart disease and makes it easier to control your blood pressure.      •Take your medicine as directed. Contact your healthcare provider if you think your medicine is not helping or if you have side effects. Tell him or her if you are allergic to any medicine. Keep a list of the medicines, vitamins, and herbs you take. Include the amounts, and when and why you take them. Bring the list or the pill bottles to follow-up visits. Carry your medicine list with you in case of an emergency.      Follow up with your doctor or cardiologist as directed: You will need to return to have your blood pressure checked and to have other lab tests done. Write down your questions so you remember to ask them during your visits.    Stages of hypertension:     Blood Pressure Readings     •Normal blood pressure is 119/79 or lower. Your healthcare provider may only check your blood pressure each year if it stays at a normal level.      •Elevated blood pressure is 120/79 to 129/79. This is sometimes called prehypertension. Your healthcare provider may suggest lifestyle changes to help lower your blood pressure to a normal level. He or she may then check it again in 3 to 6 months.      •Stage 1 hypertension is 130/80 to 139/89. Your provider may recommend lifestyle changes, medication, and checks every 3 to 6 months until your blood pressure is controlled.      •Stage 2 hypertension is 140/90 or higher. Your provider will recommend lifestyle changes and have you take 2 kinds of hypertension medicines. You will also need to have your blood pressure checked monthly until it is controlled.      Manage hypertension:   •Check your blood pressure at home. Avoid smoking, caffeine, and exercise at least 30 minutes before checking your blood pressure. Sit and rest for 5 minutes before you take your blood pressure. Extend your arm and support it on a flat surface. Your arm should be at the same level as your heart. Follow the directions that came with your blood pressure monitor. Check your blood pressure 2 times, 1 minute apart, before you take your medicine in the morning. Also check your blood pressure before your evening meal. Keep a record of your readings and bring it to your follow-up visits. Ask your healthcare provider what your blood pressure should be.  How to take a Blood Pressure           •Manage any other health conditions you have. Health conditions such as diabetes can increase your risk for hypertension. Follow your healthcare provider's instructions and take all your medicines as directed.      •Ask about all medicines. Certain medicines can increase your blood pressure. Examples include oral birth control pills, decongestants, herbal supplements, and NSAIDs, such as ibuprofen. Your healthcare provider can tell you which medicines are safe for you to take. This includes prescription and over-the-counter medicines.      Lifestyle changes you can make to manage hypertension: Your healthcare provider may recommend you work with a team to manage hypertension. The team may include medical experts such as a dietitian, an exercise or physical therapist, and a behavior therapist. Your family members may be included in helping you create lifestyle changes.    Ways to Lower Your Blood Pressure     •Limit sodium (salt) as directed. Too much sodium can affect your fluid balance. Check labels to find low-sodium or no-salt-added foods. Some low-sodium foods use potassium salts for flavor. Too much potassium can also cause health problems. Your healthcare provider will tell you how much sodium and potassium are safe for you to have in a day. He or she may recommend that you limit sodium to 2,300 mg a day.             •Follow the meal plan recommended by your healthcare provider. A dietitian or your provider can give you more information on low-sodium plans or the DASH (Dietary Approaches to Stop Hypertension) eating plan. The DASH plan is low in sodium, processed sugar, unhealthy fats, and total fat. It is high in potassium, calcium, and fiber. These can be found in vegetables, fruit, and whole-grain foods.             •Be physically active throughout the day. Physical activity, such as exercise, can help control your blood pressure and your weight. Be physically active for at least 30 minutes per day, on most days of the week. Include aerobic activity, such as walking or riding a bicycle. Also include strength training at least 2 times each week. Your healthcare providers can help you create a physical activity plan.  Ways to Be Physically Active       Strength Training for Adults           •Decrease stress. This may help lower your blood pressure. Learn ways to relax, such as deep breathing or listening to music.      •Limit alcohol as directed. Alcohol can increase your blood pressure. A drink of alcohol is 12 ounces of beer, 5 ounces of wine, or 1½ ounces of liquor.      •Do not smoke. Nicotine and other chemicals in cigarettes and cigars can increase your blood pressure and also cause lung damage. Ask your healthcare provider for information if you currently smoke and need help to quit. E-cigarettes or smokeless tobacco still contain nicotine. Talk to your healthcare provider before you use these products.  Prevent Heart Disease               © Copyright [employer] 2022           back to top                          © Copyright [employer] 2022

## 2022-03-02 NOTE — ED ADULT TRIAGE NOTE - CHIEF COMPLAINT QUOTE
Pt BIBA for hypoglycemia as per EMS fingerstick was 37mg/dl after receiving 1 amp of D50 repeat fingerstick was 173md/dl. Pt alert responding to verbal stimuli. Pt has history of hypertension.

## 2022-03-02 NOTE — ED ADULT TRIAGE NOTE - HISTORY OF COVID-19 VACCINATION
This is a recent snapshot of the patient's [facility] medical record.  For current drug dose and complete information and questions, call [phone] or In [facility] pool, fv home infusion ([id number])  CSN Number:  [id number]     Vaccine status unknown

## 2022-03-02 NOTE — ED ADULT NURSE REASSESSMENT NOTE - NS ED NURSE REASSESS COMMENT FT1
covering rn: received pt.in bed at 0200 pt.is awake and responsive. denies pain. no s/s of hypoglycemia noted. re-check FSBS 154 mg/dl. /94  aware with no new order made. re-eval by the same MD with order to d/c home/. left in the ed in stable condition.not in distress

## 2022-03-02 NOTE — ED PROVIDER NOTE - OBJECTIVE STATEMENT
89 y/o woman, h/o DM, HTN, BIBA from home for hypoglycemia, reportedly as low as 37, and EMS gave D50 prior to arrival with improvement in mental status and BG came up.  Pt also with elevated BP, says she has been compliant with all meds but does not know names.  She says she ate dinner at about 6 pm, confirmed by her daughter.  No other symptoms or injury/fall.  Pt now feels better, and at normal baseline per daughter.  She denies use of oral hypoglycemics.

## 2022-08-02 NOTE — ED PROVIDER NOTE - [facility] ED SYMPTOMS POS
Topical Sulfur Applications Pregnancy And Lactation Text: This medication is Pregnancy Category C and has an unknown safety profile during pregnancy. It is unknown if this topical medication is excreted in breast milk. Benzoyl Peroxide Counseling: Patient counseled that medicine may cause skin irritation and bleach clothing. In the event of skin irritation, the patient was advised to reduce the amount of the drug applied or use it less frequently. The patient verbalized understanding of the proper use and possible adverse effects of benzoyl peroxide. All of the patient's questions and concerns were addressed. Dapsone Counseling: I discussed with the patient the risks of dapsone including but not limited to hemolytic anemia, agranulocytosis, rashes, methemoglobinemia, kidney failure, peripheral neuropathy, headaches, GI upset, and liver toxicity. Patients who start dapsone require monitoring including baseline LFTs and weekly CBCs for the first month, then every month thereafter. The patient verbalized understanding of the proper use and possible adverse effects of dapsone. All of the patient's questions and concerns were addressed. Azithromycin Pregnancy And Lactation Text: This medication is considered safe during pregnancy and is also secreted in breast milk. Erythromycin Counseling:  I discussed with the patient the risks of erythromycin including but not limited to GI upset, allergic reaction, drug rash, diarrhea, increase in liver enzymes, and yeast infections. Erythromycin Pregnancy And Lactation Text: This medication is Pregnancy Category B and is considered safe during pregnancy. It is also excreted in breast milk. Tazorac Pregnancy And Lactation Text: This medication is not safe during pregnancy. It is unknown if this medication is excreted in breast milk. High Dose Vitamin A Pregnancy And Lactation Text: High dose vitamin A therapy is contraindicated during pregnancy and breast feeding. Spironolactone Counseling: Patient advised regarding risks of diarrhea, abdominal pain, hyperkalemia, birth defects (for female patients), liver toxicity and renal toxicity. The patient may need blood work to monitor liver and kidney function and potassium levels while on therapy. The patient verbalized understanding of the proper use and possible adverse effects of spironolactone. All of the patient's questions and concerns were addressed. Topical Clindamycin Counseling: Patient counseled that this medication may cause skin irritation or allergic reactions. In the event of skin irritation, the patient was advised to reduce the amount of the drug applied or use it less frequently. The patient verbalized understanding of the proper use and possible adverse effects of clindamycin. All of the patient's questions and concerns were addressed. Minocycline Counseling: Patient advised regarding possible photosensitivity and discoloration of the teeth, skin, lips, tongue and gums. Patient instructed to avoid sunlight, if possible. When exposed to sunlight, patients should wear protective clothing, sunglasses, and sunscreen. The patient was instructed to call the office immediately if the following severe adverse effects occur:  hearing changes, easy bruising/bleeding, severe headache, or vision changes. The patient verbalized understanding of the proper use and possible adverse effects of minocycline. All of the patient's questions and concerns were addressed. Detail Level: Zone Bactrim Counseling:  I discussed with the patient the risks of sulfa antibiotics including but not limited to GI upset, allergic reaction, drug rash, diarrhea, dizziness, photosensitivity, and yeast infections. Rarely, more serious reactions can occur including but not limited to aplastic anemia, agranulocytosis, methemoglobinemia, blood dyscrasias, liver or kidney failure, lung infiltrates or desquamative/blistering drug rashes. Benzoyl Peroxide Pregnancy And Lactation Text: This medication is Pregnancy Category C. It is unknown if benzoyl peroxide is excreted in breast milk. Bactrim Pregnancy And Lactation Text: This medication is Pregnancy Category D and is known to cause fetal risk. It is also excreted in breast milk. Isotretinoin Counseling: Patient should get monthly blood tests, not donate blood, not drive at night if vision affected, not share medication, and not undergo elective surgery for 6 months after tx completed. Side effects reviewed, pt to contact office should one occur. Spironolactone Pregnancy And Lactation Text: This medication can cause feminization of the male fetus and should be avoided during pregnancy. The active metabolite is also found in breast milk. Dapsone Pregnancy And Lactation Text: This medication is Pregnancy Category C and is not considered safe during pregnancy or breast feeding. Doxycycline Counseling:  Patient counseled regarding possible photosensitivity and increased risk for sunburn. Patient instructed to avoid sunlight, if possible. When exposed to sunlight, patients should wear protective clothing, sunglasses, and sunscreen. The patient was instructed to call the office immediately if the following severe adverse effects occur:  hearing changes, easy bruising/bleeding, severe headache, or vision changes. The patient verbalized understanding of the proper use and possible adverse effects of doxycycline. All of the patient's questions and concerns were addressed. Tetracycline Counseling: Patient counseled regarding possible photosensitivity and increased risk for sunburn. Patient instructed to avoid sunlight, if possible. When exposed to sunlight, patients should wear protective clothing, sunglasses, and sunscreen. The patient was instructed to call the office immediately if the following severe adverse effects occur:  hearing changes, easy bruising/bleeding, severe headache, or vision changes. The patient verbalized understanding of the proper use and possible adverse effects of tetracycline. All of the patient's questions and concerns were addressed. Patient understands to avoid pregnancy while on therapy due to potential birth defects. Include Pregnancy/Lactation Warning?: No Topical Retinoid counseling:  Patient advised to apply a pea-sized amount only at bedtime and wait 30 minutes after washing their face before applying. If too drying, patient may add a non-comedogenic moisturizer. The patient verbalized understanding of the proper use and possible adverse effects of retinoids. All of the patient's questions and concerns were addressed. Minocycline Pregnancy And Lactation Text: This medication is Pregnancy Category D and not consider safe during pregnancy. It is also excreted in breast milk. Topical Retinoid Pregnancy And Lactation Text: This medication is Pregnancy Category C. It is unknown if this medication is excreted in breast milk. Birth Control Pills Counseling: Birth Control Pill Counseling: I discussed with the patient the potential side effects of OCPs including but not limited to increased risk of stroke, heart attack, thrombophlebitis, deep venous thrombosis, hepatic adenomas, breast changes, GI upset, headaches, and depression. The patient verbalized understanding of the proper use and possible adverse effects of OCPs. All of the patient's questions and concerns were addressed. Topical Clindamycin Pregnancy And Lactation Text: This medication is Pregnancy Category B and is considered safe during pregnancy. It is unknown if it is excreted in breast milk. Isotretinoin Pregnancy And Lactation Text: This medication is Pregnancy Category X and is considered extremely dangerous during pregnancy. It is unknown if it is excreted in breast milk. High Dose Vitamin A Counseling: Side effects reviewed, pt to contact office should one occur. Topical Sulfur Applications Counseling: Topical Sulfur Counseling: Patient counseled that this medication may cause skin irritation or allergic reactions. In the event of skin irritation, the patient was advised to reduce the amount of the drug applied or use it less frequently. The patient verbalized understanding of the proper use and possible adverse effects of topical sulfur application. All of the patient's questions and concerns were addressed. Azithromycin Counseling:  I discussed with the patient the risks of azithromycin including but not limited to GI upset, allergic reaction, drug rash, diarrhea, and yeast infections. Sarecycline Counseling: Patient advised regarding possible photosensitivity and discoloration of the teeth, skin, lips, tongue and gums. Patient instructed to avoid sunlight, if possible. When exposed to sunlight, patients should wear protective clothing, sunglasses, and sunscreen. The patient was instructed to call the office immediately if the following severe adverse effects occur:  hearing changes, easy bruising/bleeding, severe headache, or vision changes. The patient verbalized understanding of the proper use and possible adverse effects of sarecycline. All of the patient's questions and concerns were addressed. Doxycycline Pregnancy And Lactation Text: This medication is Pregnancy Category D and not consider safe during pregnancy. It is also excreted in breast milk but is considered safe for shorter treatment courses. Birth Control Pills Pregnancy And Lactation Text: This medication should be avoided if pregnant and for the first 30 days post-partum. Tazorac Counseling:  Patient advised that medication is irritating and drying. Patient may need to apply sparingly and wash off after an hour before eventually leaving it on overnight. The patient verbalized understanding of the proper use and possible adverse effects of tazorac. All of the patient's questions and concerns were addressed. Hypoglycemia Detail Level: Detailed

## 2023-01-02 NOTE — PHYSICAL THERAPY INITIAL EVALUATION ADULT - MANUAL MUSCLE TESTING RESULTS, REHAB EVAL
Problem: Delirium, Risk for  Goal: # No symptoms of delirium  Description: Evaluate delirium symptoms under active problem when present  Outcome: Outcome Met, Continue evaluating goal progress toward completion     Problem: Respiratory Function, Ineffective (with Ventilator)  Goal: Oxygenation/ventilation parameters are achieved/maintained without ventilator support (with/without artificial airway)  Description: Patients are considered \"ready\" for weaning when there is the is some resolution in the acute phase of current illness, cardiac stability (HR<140) without vasopressors, afebrile (<38C), and adequate gas exchange (PaO2/FIO2 ratio>150-200 on lower PEEP/CPAP), and able to initiate inspiratory effort (Alfredo, 2001)  Outcome: Outcome Not Met, Continue to Monitor  Goal: Verbalizes/demonstrates understanding of treatment plan and their role in successful weaning  Description: Document on Patient Education Activity  Outcome: Outcome Not Met, Continue to Monitor     Problem: At Risk for Falls  Goal: # Patient does not fall  Outcome: Outcome Met, Continue evaluating goal progress toward completion  Goal: # Takes action to control fall-related risks  Outcome: Outcome Not Met, Continue to Monitor     Problem: VTE, Risk for  Goal: # No s/s of VTE  Outcome: Outcome Met, Continue evaluating goal progress toward completion     Problem: Potential for injury, Restraints  Goal: # Remains free from injury  Outcome: Outcome Met, Continue evaluating goal progress toward completion  Goal: Verbalizes criteria for restraint discontinuation  Description: Document on Patient Education Activity  Outcome: Outcome Not Met, Continue to Monitor      grossly 3/5 t/o extremities/grossly assessed due to

## 2023-04-04 NOTE — ED ADULT NURSE NOTE - CAS TRG GENERAL AIRWAY, MLM
[de-identified] : Patient is WDWN, alert, and in no acute distress. Breathing is unlabored. 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

## 2023-05-13 NOTE — PHYSICAL THERAPY INITIAL EVALUATION ADULT - ORIENTATION, REHAB EVAL
"Steph Monroe is a 76 y.o. female admitted to [facility] on 2023 for Critical limb ischemia of right lower extremity. Patient previously being seen by PT but orders discontinued secondary to R femoral endarterectomy performed on 23. Steph Monroe tolerated re-evaluation well today. She is very pleasant and agreeable to re-evaluation, hard of hearing, reports improving RLE pain (4-5/10 throughout session). She was able to ambulate 25 ft on room air with the rolling walker and stand-by assistance, patient utilizing a 3-pt gait sequence to offload painful R foot with walker as needed. Distance ultimately limited by R foot discomfort with walking. Up into chair, comfortable with pillow under RLE at end of session. Discussed PT role, POC, and goals with patient; verbalized understanding. Steph Monroe would benefit from acute PT services to promote mobility during this admission and improve return to PLOF.    Problem: Physical Therapy  Goal: Physical Therapy Goal  Description: Goals to be met by: 23     Patient will increase functional independence with mobility by performin. Supine to sit with Modified [place] - Not met  2. Sit to stand transfer with Supervision with RW - Not met  3. Gait x 150 feet with SBA using Rolling Walker - Not met  4. Ascend/descend 6" curb step with RW and CGA - Not met  Outcome: Ongoing, Progressing    Joseph Calhoun, PT  2023  " oriented to person, place, time and situation

## 2023-06-20 NOTE — ED ADULT NURSE NOTE - NSSUHOSCREENINGYN_ED_ALL_ED
Detail Level: Zone Chronology Of Present Illness: 6/20/23\\nPatient noted condition started after having a rash to thumb x months. Rash resolved, but patient now experiencing lifting of nail from nail bed since March with slight subungual debris. Will perform nail clipping on todays exam to r/o onychomycosis. Discussed doing topical steroids, will defer for now and wait for culture results. Patient to f/u in 6 weeks. Yes - the patient is able to be screened

## 2023-09-24 NOTE — ED PROVIDER NOTE - DISPOSITION TYPE
I have personally seen and examined the patient. I have collaborated with and supervised the
DISCHARGE

## 2024-04-18 NOTE — ED PROVIDER NOTE - OBJECTIVE STATEMENT
The patient is a 39y Male complaining of 
89 y/o female h/o IDDM, HTN, anemia, BIB EMS from home after daughter found her unresponsive. EMS reported blood glucose of 23. EMS treated her with IV D50 1amp and the repeat blood glucose was 165. Currently she denies other symptoms, specifically vomiting, chest pain, abdominal pain, or headache. States she has been eating normally.

## 2025-02-11 NOTE — ED ADULT NURSE NOTE - CAS DISCH TRANSFER METHOD
pt brought by JORI from  [place] [id number] for aggressive behavior. Pt was hitting doors and kicking objects. Pt calm and cooperative in triage. Pt denies SI, HI, hallucinations, chest pain, sob, n+v, headahce, dizziness. RR even, unlabored. hx schizophrenia. KARISHMA Dorman called, plan for BH
Private car

## 2025-03-01 ENCOUNTER — INPATIENT (INPATIENT)
Facility: HOSPITAL | Age: 89
LOS: 4 days | Discharge: HOME | DRG: 556 | End: 2025-03-06
Attending: STUDENT IN AN ORGANIZED HEALTH CARE EDUCATION/TRAINING PROGRAM | Admitting: STUDENT IN AN ORGANIZED HEALTH CARE EDUCATION/TRAINING PROGRAM
Payer: COMMERCIAL

## 2025-03-01 VITALS
DIASTOLIC BLOOD PRESSURE: 69 MMHG | OXYGEN SATURATION: 96 % | HEIGHT: 66 IN | RESPIRATION RATE: 16 BRPM | SYSTOLIC BLOOD PRESSURE: 126 MMHG | TEMPERATURE: 98 F | HEART RATE: 88 BPM | WEIGHT: 148.59 LBS

## 2025-03-01 LAB
ALBUMIN SERPL ELPH-MCNC: 3.1 G/DL — LOW (ref 3.5–5)
ALP SERPL-CCNC: 76 U/L — SIGNIFICANT CHANGE UP (ref 40–120)
ALT FLD-CCNC: 19 U/L DA — SIGNIFICANT CHANGE UP (ref 10–60)
ANION GAP SERPL CALC-SCNC: 10 MMOL/L — SIGNIFICANT CHANGE UP (ref 5–17)
ANISOCYTOSIS BLD QL: SLIGHT — SIGNIFICANT CHANGE UP
APTT BLD: 30.5 SEC — SIGNIFICANT CHANGE UP (ref 24.5–35.6)
AST SERPL-CCNC: 39 U/L — SIGNIFICANT CHANGE UP (ref 10–40)
BASOPHILS # BLD AUTO: 0.02 K/UL — SIGNIFICANT CHANGE UP (ref 0–0.2)
BASOPHILS NFR BLD AUTO: 0.2 % — SIGNIFICANT CHANGE UP (ref 0–2)
BILIRUB SERPL-MCNC: 0.5 MG/DL — SIGNIFICANT CHANGE UP (ref 0.2–1.2)
BUN SERPL-MCNC: 23 MG/DL — HIGH (ref 7–18)
CALCIUM SERPL-MCNC: 9.2 MG/DL — SIGNIFICANT CHANGE UP (ref 8.4–10.5)
CHLORIDE SERPL-SCNC: 107 MMOL/L — SIGNIFICANT CHANGE UP (ref 96–108)
CO2 SERPL-SCNC: 20 MMOL/L — LOW (ref 22–31)
CREAT SERPL-MCNC: 1.21 MG/DL — SIGNIFICANT CHANGE UP (ref 0.5–1.3)
D DIMER BLD IA.RAPID-MCNC: 2839 NG/ML DDU — HIGH
EGFR: 42 ML/MIN/1.73M2 — LOW
EGFR: 42 ML/MIN/1.73M2 — LOW
EOSINOPHIL # BLD AUTO: 0.01 K/UL — SIGNIFICANT CHANGE UP (ref 0–0.5)
EOSINOPHIL NFR BLD AUTO: 0.1 % — SIGNIFICANT CHANGE UP (ref 0–6)
FLUAV AG NPH QL: SIGNIFICANT CHANGE UP
FLUBV AG NPH QL: SIGNIFICANT CHANGE UP
GIANT PLATELETS BLD QL SMEAR: PRESENT — SIGNIFICANT CHANGE UP
GLUCOSE SERPL-MCNC: 263 MG/DL — HIGH (ref 70–99)
HCT VFR BLD CALC: 30.1 % — LOW (ref 34.5–45)
HGB BLD-MCNC: 9.7 G/DL — LOW (ref 11.5–15.5)
IMM GRANULOCYTES NFR BLD AUTO: 0.5 % — SIGNIFICANT CHANGE UP (ref 0–0.9)
INR BLD: 0.99 RATIO — SIGNIFICANT CHANGE UP (ref 0.85–1.16)
LG PLATELETS BLD QL AUTO: SLIGHT — SIGNIFICANT CHANGE UP
LYMPHOCYTES # BLD AUTO: 1.86 K/UL — SIGNIFICANT CHANGE UP (ref 1–3.3)
LYMPHOCYTES # BLD AUTO: 14 % — SIGNIFICANT CHANGE UP (ref 13–44)
MANUAL SMEAR VERIFICATION: SIGNIFICANT CHANGE UP
MCHC RBC-ENTMCNC: 26.4 PG — LOW (ref 27–34)
MCHC RBC-ENTMCNC: 32.2 G/DL — SIGNIFICANT CHANGE UP (ref 32–36)
MCV RBC AUTO: 81.8 FL — SIGNIFICANT CHANGE UP (ref 80–100)
MONOCYTES # BLD AUTO: 1.06 K/UL — HIGH (ref 0–0.9)
MONOCYTES NFR BLD AUTO: 8 % — SIGNIFICANT CHANGE UP (ref 2–14)
NEUTROPHILS # BLD AUTO: 10.22 K/UL — HIGH (ref 1.8–7.4)
NEUTROPHILS NFR BLD AUTO: 77.2 % — HIGH (ref 43–77)
NRBC BLD AUTO-RTO: 0 /100 WBCS — SIGNIFICANT CHANGE UP (ref 0–0)
NT-PROBNP SERPL-SCNC: 629 PG/ML — HIGH (ref 0–450)
OVALOCYTES BLD QL SMEAR: SLIGHT — SIGNIFICANT CHANGE UP
PLAT MORPH BLD: NORMAL — SIGNIFICANT CHANGE UP
PLATELET # BLD AUTO: 220 K/UL — SIGNIFICANT CHANGE UP (ref 150–400)
PLATELET COUNT - ESTIMATE: NORMAL — SIGNIFICANT CHANGE UP
POIKILOCYTOSIS BLD QL AUTO: SLIGHT — SIGNIFICANT CHANGE UP
POTASSIUM SERPL-MCNC: 4.5 MMOL/L — SIGNIFICANT CHANGE UP (ref 3.5–5.3)
POTASSIUM SERPL-SCNC: 4.5 MMOL/L — SIGNIFICANT CHANGE UP (ref 3.5–5.3)
PROT SERPL-MCNC: 7.3 G/DL — SIGNIFICANT CHANGE UP (ref 6–8.3)
PROTHROM AB SERPL-ACNC: 11.5 SEC — SIGNIFICANT CHANGE UP (ref 9.9–13.4)
RBC # BLD: 3.68 M/UL — LOW (ref 3.8–5.2)
RBC # FLD: 15.8 % — HIGH (ref 10.3–14.5)
RBC BLD AUTO: ABNORMAL
RSV RNA NPH QL NAA+NON-PROBE: SIGNIFICANT CHANGE UP
SARS-COV-2 RNA SPEC QL NAA+PROBE: SIGNIFICANT CHANGE UP
SODIUM SERPL-SCNC: 137 MMOL/L — SIGNIFICANT CHANGE UP (ref 135–145)
TROPONIN I, HIGH SENSITIVITY RESULT: 22.6 NG/L — SIGNIFICANT CHANGE UP
WBC # BLD: 13.24 K/UL — HIGH (ref 3.8–10.5)
WBC # FLD AUTO: 13.24 K/UL — HIGH (ref 3.8–10.5)

## 2025-03-01 PROCEDURE — 73630 X-RAY EXAM OF FOOT: CPT | Mod: 26,LT

## 2025-03-01 PROCEDURE — 71045 X-RAY EXAM CHEST 1 VIEW: CPT | Mod: 26

## 2025-03-01 PROCEDURE — 99223 1ST HOSP IP/OBS HIGH 75: CPT | Mod: GC

## 2025-03-01 PROCEDURE — 73610 X-RAY EXAM OF ANKLE: CPT | Mod: 26,LT

## 2025-03-01 PROCEDURE — 99285 EMERGENCY DEPT VISIT HI MDM: CPT

## 2025-03-01 RX ORDER — METHYLPREDNISOLONE ACETATE 80 MG/ML
125 INJECTION, SUSPENSION INTRA-ARTICULAR; INTRALESIONAL; INTRAMUSCULAR; SOFT TISSUE ONCE
Refills: 0 | Status: COMPLETED | OUTPATIENT
Start: 2025-03-01 | End: 2025-03-01

## 2025-03-01 RX ORDER — DIPHENHYDRAMINE HCL 12.5MG/5ML
50 ELIXIR ORAL ONCE
Refills: 0 | Status: COMPLETED | OUTPATIENT
Start: 2025-03-01 | End: 2025-03-01

## 2025-03-01 RX ORDER — CYCLOBENZAPRINE HYDROCHLORIDE 15 MG/1
10 CAPSULE, EXTENDED RELEASE ORAL ONCE
Refills: 0 | Status: COMPLETED | OUTPATIENT
Start: 2025-03-01 | End: 2025-03-01

## 2025-03-01 RX ORDER — ACETAMINOPHEN 500 MG/5ML
650 LIQUID (ML) ORAL ONCE
Refills: 0 | Status: COMPLETED | OUTPATIENT
Start: 2025-03-01 | End: 2025-03-01

## 2025-03-01 RX ORDER — LIDOCAINE HYDROCHLORIDE 20 MG/ML
1 JELLY TOPICAL ONCE
Refills: 0 | Status: COMPLETED | OUTPATIENT
Start: 2025-03-01 | End: 2025-03-01

## 2025-03-01 RX ADMIN — LIDOCAINE HYDROCHLORIDE 1 PATCH: 20 JELLY TOPICAL at 22:11

## 2025-03-01 RX ADMIN — METHYLPREDNISOLONE ACETATE 125 MILLIGRAM(S): 80 INJECTION, SUSPENSION INTRA-ARTICULAR; INTRALESIONAL; INTRAMUSCULAR; SOFT TISSUE at 23:47

## 2025-03-01 RX ADMIN — Medication 650 MILLIGRAM(S): at 22:11

## 2025-03-01 RX ADMIN — CYCLOBENZAPRINE HYDROCHLORIDE 10 MILLIGRAM(S): 15 CAPSULE, EXTENDED RELEASE ORAL at 22:12

## 2025-03-01 NOTE — H&P ADULT - ASSESSMENT
89 yo F w/ hx of DM2, HTN, OA, spinal stenosis, CKD III, chronic anemia, hyperthyroidism, seizures on Keppra presents with ambulatory dysfunction and SOB x1 day. DDimer elevated, Xray foot negative. Patient admitted for ambulatory dysfunction and r/o DVT/PE.

## 2025-03-01 NOTE — H&P ADULT - NSICDXPASTSURGICALHX_GEN_ALL_CORE_FT
PAST SURGICAL HISTORY:  H/O mastoidectomy Right ear    History of appendectomy 1960's    History of cholecystectomy 1960's    History of colon polyps removal colonoscopy 2010    History of hysterectomy age: 30's    History of total knee replacement right 2006  left 2011    Hx of cataract surgery bilateral 2008    L3- L5 laminectomy with fusion 2/2013

## 2025-03-01 NOTE — H&P ADULT - NSHPSOCIALHISTORY_GEN_ALL_CORE
Patient is a retired, elderly female who lives with daughter in [place]. Normally ambulates with cane. Denies any tobacco, ETOH, or drug use.

## 2025-03-01 NOTE — ED PROVIDER NOTE - OBJECTIVE STATEMENT
Patient is a 89-year-old female from home ambulates with a cane with minimal assistance with past medical history anemia, anxiety, HTN, IDDM, seizures on Keppra Presenting from home with left foot pain and difficulty walking since yesterday.  Patient daughter at bedside states patient usually lives in [place] with her other daughter.  Patient is visiting [place] and arrived last week.  Patient has been ambulatory with a cane however since yesterday patient has had left foot pain at the dorsal aspect and has had difficulty bearing weight.  Denies falls, LOC, blood thinners, trauma.  Patient states she feels short of breath intermittently while walking as well.
I have personally seen and examined this patient.  I have fully participated in the care of this patient. I have reviewed all pertinent clinical information, including history, physical exam, plan and the Resident’s note and agree except as noted.

## 2025-03-01 NOTE — ED PROVIDER NOTE - PHYSICAL EXAMINATION
Gen: no acute distress  Head: normocephalic, atraumatic  Lung: CTAB, no respiratory distress, no wheezing, rales, rhonchi  CV: normal s1/s2, rrr,   Abd: soft, non-tender, non-distended  MSK: Left foot mildly tender to palpation on the dorsal aspect, no tenderness to palpation at the base of the first or fifth metatarsal, no tenderness at the ankle, full range of motion at the ankle knee and hip, 2+ pulses, capillary refill less than 2 seconds, no obvious deformities   Neuro: No focal neurologic deficits

## 2025-03-01 NOTE — H&P ADULT - HISTORY OF PRESENT ILLNESS
89 yo F w/ hx of DM2, HTN, OA, spinal stenosis, CKD III, chronic anemia, hyperthyroidism, seizures on Keppra presents with ambulatory dysfunction x1 day. Per patient and daughter at bedside, patient was "bent over in pain" and "walking funny" while trying to walk to bathroom this AM. Per daughter, patient normally ambulates with cane and assistance, had no issues day prior. Denies any trauma, fall, LOC, dizziness, or seizure like activity. Patient normally lives with daughter in [place] and is now visiting daughter in [place] after a 2 hour car ride last Saturday. Patient daughter also reports patient had some SOB after walking to bathroom. Of note, patient had recent hospitalization in [place] for fall and was started on seizure medications.    In ED:   VS: /67 / HR 77 / T 97.7 / 96%02 on RA  WBC 13.24  Hgb 9.7  DDimer 2839  s/p solumedrol x1, flexeril x1, benadryl x1

## 2025-03-01 NOTE — H&P ADULT - ATTENDING COMMENTS
IMAGING  Chest X-Ray  Pending official read; on my read no infiltrate, consolidation, or effusion.    Left Foot X-Ray  Left Ankle X-Ray  Pending official read; on my read there is no fracture    HPI  89 year old female patient with pmhx anemia, anxiety, HTN, HLD, IDDM, seizures on Keppra, meningioma, spinal stenosis, CKD, OA, hyperthyroidism who presented to the ER due to ambulatory dysfunction and left foot pain since yesterday.  The patient typically lives in [place] with her other daughter and on last Saturday came to [place] to visit. She had a 2 hour car ride to get to [place]. She typically ambulates with a cane, but since yesterday she has been having difficulty ambulating due to pain in her left foot. She has pain on weight bearing of her left lower extremity and when the top lateral aspect of her left foot is palpated. No recent injuries to her foot and no recent falls.    Review Of Systems included: + left foot pain, + ambulatory dysfunction, + intermittent dyspnea on exertion,   No injuries to foot, no chest pain, no abdominal pain, no shortness of breath, no dysuria, no diarrhea, no constipation, no fever, no chills, no falls    Physical Exam  General: Awake, Alert, Oriented x2-3 (at baseline)  Cardiac: RRR  Pulmonary: CTA b/l  Abdominal: Soft, ND, NT  Extremities: Left food with tenderness to top of foot, no erythema, 2+ pulses b/l, no edema b/l    A/P  # Ambulatory Dysfunction  # Left Foot Pain  - Podiatry consulted  - Follow up foot x-ray official read  - Check Venous Duplex U/S Left Lower Extremity  - PT Evaluation  - Fall precautions  - Tylenol prn    # Hx of IDDM  - Insulin Sliding Scale  - Blood Glucose Monitoring  - Continue home long acting insulin as Lantus at 70-80% of home dose    # Hx of anemia, anxiety, HTN, HLD, seizures on Keppra, meningioma, spinal stenosis, CKD, OA, hyperthyroidism  - Continue home medications Amlodipine, Atorvastatin, Hydralazine, Keppra, Methimazole    # DVT PPx  - Heparin    # FEN  - Diabetic DASH Diet  - Monitor and replete electrolytes as needed  - Aspiration Precautions  - Fall Precautions    FULL CODE    Previous Admissions Included  3/2/2022: ER Visit for hypoglycemia  2/26/2022: ER Visit for hypoglycemia  6/26/2021: ER Visit for hypoglycemia  3/15/2021: ER Visit for hypoglycemia  10/30/2018: Neurosurgery Clinic Visit: Incidentally found falcine meningioma. She is asymptomatic and neurologically intact.  10/7/2018 - 10/10/2018: Meningioma, Fall, Right hand third distal phalanx fracture  6/16/2015: ER Visit for UTI  6/27/2013 - 7/4/2013: C Diff  6/12/2013 - 6/19/2013: C Diff, UTI  3/26/2013: 3/30/2013: s/p C3-C7 laminectomy, C3-C6 posterior spinal fusion  2/27/2013 - 3/5/2013: s/p L3-5 laminectomy/PSIF    Patient case and management was discussed with ER Attending  I did examine all labs (including CBC, PT/INR, APTT, D-Dimer, CMP, Troponin, pro-BNP, COVID/Flu/RSV Test), imaging, prior notes    Time-based billing (NON-critical care).  76 minutes spent on total encounter excluding any time spent teaching residents. The necessity of the time spent during the encounter on this date of service was due to: Review of records, vital signs, labs, microbiology, and imaging, Ordering labs and/or tests, General physical examination performed, Generation of treatment plan, Counseling of the patient and/or family members IMAGING  Chest X-Ray  Pending official read; on my read no infiltrate, consolidation, or effusion.    Left Foot X-Ray  Left Ankle X-Ray  Pending official read; on my read there is no fracture    HPI  89 year old female patient with pmhx anemia, anxiety, HTN, HLD, IDDM, seizures on Keppra, meningioma, spinal stenosis, CKD, OA, hyperthyroidism who presented to the ER due to ambulatory dysfunction and left foot pain since yesterday.  The patient typically lives in [place] with her other daughter and on last Saturday came to [place] to visit. She had a 2 hour car ride to get to [place]. She typically ambulates with a cane, but since yesterday she has been having difficulty ambulating due to pain in her left foot. She has pain on weight bearing of her left lower extremity and when the top lateral aspect of her left foot is palpated. No recent injuries to her foot and no recent falls.    Review Of Systems included: + left foot pain, + ambulatory dysfunction, + intermittent dyspnea on exertion,   No injuries to foot, no chest pain, no abdominal pain, no shortness of breath, no dysuria, no diarrhea, no constipation, no fever, no chills, no falls    Physical Exam  General: Awake, Alert, Oriented x2-3 (at baseline)  Cardiac: RRR  Pulmonary: CTA b/l  Abdominal: Soft, ND, NT  Extremities: Left food with tenderness to top of foot, no erythema, 2+ pulses b/l, no edema b/l    A/P  # Ambulatory Dysfunction  # Left Foot Pain  # Elevated D-Dimer, r/o blood clot  - Podiatry consulted  - Follow up foot x-ray official read  - Check Venous Duplex U/S Left Lower Extremity  - PT Evaluation  - Fall precautions  - Tylenol prn    # Hx of IDDM  - Insulin Sliding Scale  - Blood Glucose Monitoring  - Continue home long acting insulin as Lantus at 70-80% of home dose    # Hx of anemia, anxiety, HTN, HLD, seizures on Keppra, meningioma, spinal stenosis, CKD, OA, hyperthyroidism  - Continue home medications Amlodipine, Atorvastatin, Hydralazine, Keppra, Methimazole    # DVT PPx  - Heparin    # FEN  - Diabetic DASH Diet  - Monitor and replete electrolytes as needed  - Aspiration Precautions  - Fall Precautions    FULL CODE    Previous Admissions Included  3/2/2022: ER Visit for hypoglycemia  2/26/2022: ER Visit for hypoglycemia  6/26/2021: ER Visit for hypoglycemia  3/15/2021: ER Visit for hypoglycemia  10/30/2018: Neurosurgery Clinic Visit: Incidentally found falcine meningioma. She is asymptomatic and neurologically intact.  10/7/2018 - 10/10/2018: Meningioma, Fall, Right hand third distal phalanx fracture  6/16/2015: ER Visit for UTI  6/27/2013 - 7/4/2013: C Diff  6/12/2013 - 6/19/2013: C Diff, UTI  3/26/2013: 3/30/2013: s/p C3-C7 laminectomy, C3-C6 posterior spinal fusion  2/27/2013 - 3/5/2013: s/p L3-5 laminectomy/PSIF    Patient case and management was discussed with ER Attending  I did examine all labs (including CBC, PT/INR, APTT, D-Dimer, CMP, Troponin, pro-BNP, COVID/Flu/RSV Test), imaging, prior notes    Time-based billing (NON-critical care).  76 minutes spent on total encounter excluding any time spent teaching residents. The necessity of the time spent during the encounter on this date of service was due to: Review of records, vital signs, labs, microbiology, and imaging, Ordering labs and/or tests, General physical examination performed, Generation of treatment plan, Counseling of the patient and/or family members IMAGING  Chest X-Ray  Pending official read; on my read no infiltrate, consolidation, or effusion.    Left Foot X-Ray  Left Ankle X-Ray  Pending official read; on my read there is no fracture    CTA Chest  IMPRESSION:  Limited by motion artifact. No main, lobar or segmental pulmonary   embolism. No evidence of pneumonia.  Age-indeterminate mild L1 compression fracture.    HPI  89 year old female patient with pmhx anemia, anxiety, HTN, HLD, IDDM, seizures on Keppra, meningioma, spinal stenosis, CKD, OA, hyperthyroidism who presented to the ER due to ambulatory dysfunction and left foot pain since yesterday.  The patient typically lives in [place] with her other daughter and on last Saturday came to [place] to visit. She had a 2 hour car ride to get to [place]. She typically ambulates with a cane, but since yesterday she has been having difficulty ambulating due to pain in her left foot. She has pain on weight bearing of her left lower extremity and when the top lateral aspect of her left foot is palpated. No recent injuries to her foot and no recent falls.    Review Of Systems included: + left foot pain, + ambulatory dysfunction, + intermittent dyspnea on exertion,   No injuries to foot, no chest pain, no abdominal pain, no shortness of breath, no dysuria, no diarrhea, no constipation, no fever, no chills, no falls    Physical Exam  General: Awake, Alert, Oriented x2-3 (at baseline)  Cardiac: RRR  Pulmonary: CTA b/l  Abdominal: Soft, ND, NT  Extremities: Left food with tenderness to top of foot, no erythema, 2+ pulses b/l, no edema b/l    A/P  # Ambulatory Dysfunction  # Left Foot Pain  # Elevated D-Dimer, r/o blood clot  - Podiatry consulted  - Follow up foot x-ray official read  - Check Venous Duplex U/S Left Lower Extremity  - PT Evaluation  - Fall precautions  - Tylenol prn    # Hx of IDDM  - Insulin Sliding Scale  - Blood Glucose Monitoring  - Continue home long acting insulin as Lantus at 70-80% of home dose    # Hx of anemia, anxiety, HTN, HLD, seizures on Keppra, meningioma, spinal stenosis, CKD, OA, hyperthyroidism  - Continue home medications Amlodipine, Atorvastatin, Hydralazine, Keppra, Methimazole    # DVT PPx  - Heparin    # FEN  - Diabetic DASH Diet  - Monitor and replete electrolytes as needed  - Aspiration Precautions  - Fall Precautions    FULL CODE    Previous Admissions Included  3/2/2022: ER Visit for hypoglycemia  2/26/2022: ER Visit for hypoglycemia  6/26/2021: ER Visit for hypoglycemia  3/15/2021: ER Visit for hypoglycemia  10/30/2018: Neurosurgery Clinic Visit: Incidentally found falcine meningioma. She is asymptomatic and neurologically intact.  10/7/2018 - 10/10/2018: Meningioma, Fall, Right hand third distal phalanx fracture  6/16/2015: ER Visit for UTI  6/27/2013 - 7/4/2013: C Diff  6/12/2013 - 6/19/2013: C Diff, UTI  3/26/2013: 3/30/2013: s/p C3-C7 laminectomy, C3-C6 posterior spinal fusion  2/27/2013 - 3/5/2013: s/p L3-5 laminectomy/PSIF    Patient case and management was discussed with ER Attending  I did examine all labs (including CBC, PT/INR, APTT, D-Dimer, CMP, Troponin, pro-BNP, COVID/Flu/RSV Test), imaging, prior notes    Time-based billing (NON-critical care).  76 minutes spent on total encounter excluding any time spent teaching residents. The necessity of the time spent during the encounter on this date of service was due to: Review of records, vital signs, labs, microbiology, and imaging, Ordering labs and/or tests, General physical examination performed, Generation of treatment plan, Counseling of the patient and/or family members

## 2025-03-01 NOTE — H&P ADULT - NSHPPHYSICALEXAM_GEN_ALL_CORE
GENERAL: NAD; lying in bed  HEAD:  Atraumatic, Normocephalic  EYES: Mild periorbital swelling of left eye. EOMI, PERRLA, conjunctiva and sclera clear  ENMT: No tonsillar erythema, exudates, or enlargement; Moist mucous membranes  NECK: Supple, normal appearance, No JVD  NERVOUS SYSTEM:  Alert & Oriented X2-3,  Motor Strength 5/5 B/L upper and lower extremities, sensation intact  CHEST/LUNG: Lungs clear to auscultation bilaterally, No rales, rhonchi, wheezing   HEART: Regular rate and rhythm; No murmurs, rubs, or gallops  ABDOMEN: Soft, Nontender, Nondistended; Bowel sounds present  EXTREMITIES:  TTP in dorsolateral aspect of Left Foot. 2+ Peripheral Pulses, No clubbing, cyanosis, or edema  SKIN: No rashes or lesions;    Vital Signs Last 24 Hrs  T(C): 36.5 (01 Mar 2025 23:46), Max: 36.5 (01 Mar 2025 23:46)  T(F): 97.7 (01 Mar 2025 23:46), Max: 97.7 (01 Mar 2025 23:46)  HR: 77 (01 Mar 2025 23:46) (77 - 88)  BP: 133/67 (01 Mar 2025 23:46) (126/69 - 133/67)  BP(mean): --  RR: 18 (01 Mar 2025 23:46) (16 - 18)  SpO2: 96% (01 Mar 2025 23:46) (96% - 96%)    Parameters below as of 01 Mar 2025 23:46  Patient On (Oxygen Delivery Method): room air

## 2025-03-01 NOTE — ED PROVIDER NOTE - CLINICAL SUMMARY MEDICAL DECISION MAKING FREE TEXT BOX
Patient is a 89-year-old female from home ambulates with a cane with minimal assistance with past medical history anemia, anxiety, HTN, IDDM, seizures on Keppra Presenting from home with left foot pain and difficulty walking since yesterday.  VSS NVI Patient is a 89-year-old female from home ambulates with a cane with minimal assistance with past medical history anemia, anxiety, HTN, IDDM, seizures on Keppra Presenting from home with left foot pain and difficulty walking since yesterday.  VSS NVI  Left foot mildly tender to palpation on the dorsal aspect, no tenderness to palpation at the base of the first or fifth metatarsal, no tenderness at the ankle, full range of motion at the ankle knee and hip, 2+ pulses, capillary refill less than 2 seconds, no obvious deformities.  - Will treat likely musculoskeletal pain, x-ray to rule out fracture, check labs and rule out electrolyte abnormalities D-dimer to rule out DVT/pulmonary embolism, EKG troponin to assess for ACS versus arrhythmia, BNP chest x-ray to eval for heart failure vs pna vs ptx, reassess.

## 2025-03-01 NOTE — H&P ADULT - PROBLEM SELECTOR PLAN 3
hx of DM on home Januvia 50mg, Tresiba 20u, Novolog 6u premeal   on admission  hold home Januvia  started on Lantus 15u, Ademelog 6u premeal, and MICHELLE  monitor sugars and adjust accordingly

## 2025-03-01 NOTE — H&P ADULT - PROBLEM SELECTOR PLAN 1
presented with left foot pain limiting ability to ambulate  ambulates with cane at baseline  denies trauma, fall, LOC  Xray left foot negative for acute fracture  likely chronic arthritic changes  partial weight bearing, ambulate with assistance  f/u PT eval  f/u podiatry consult  fall precautions  acetamenophen 650prn and lidocaine patch for pain

## 2025-03-01 NOTE — ED PROVIDER NOTE - PROGRESS NOTE DETAILS
VSS resting comfortably. Labs showing elevated D-dimer.  Given patient with contrast allergy, will administer methylprednisolone, Benadryl 3 hours after methylprednisolone, CT angio 1 hour after Benadryl  As per contrast allergy protocol.  Will continue to monitor. Eamon Harris MD. X-ray without evidence of fracture however patient still with difficulty bearing weight and ambulating.  Podiatry consulted.  Will admit to medicine for ambulatory dysfunction.  CT angio pending. Eamon Harris MD.

## 2025-03-01 NOTE — H&P ADULT - NSHPREVIEWOFSYSTEMS_GEN_ALL_CORE
CONSTITUTIONAL: Denies fever, chills, fatigue  HEENT: Denies acute changes in vision and hearing  CARDIO: Denies CP, SOB, palpitations  PULM: Denies cough, wheezing, SOB  ABD: Denies abd pain, n/v/d/c  : Denies dysuria, urinary frequency  NEURO: Denies HA, numbness/tingling  EXTREMITIES: Admits to pain in dorsolateral aspect of left foot. Denies LE swelling, calf pain

## 2025-03-01 NOTE — H&P ADULT - PROBLEM SELECTOR PLAN 2
presented with SOB and left foot pain  DDimer 2839  96% O2 sat on RA, HR stable  r/o DVT   No ssx of compartment syndrome  s/p benadryl x1 and solumedrol x1 iso IV contrast allergy  f/u Duplex US b/l LE  f/u CTA Chest r/o PE iso SOB  -full dose lovenox

## 2025-03-02 DIAGNOSIS — R79.89 OTHER SPECIFIED ABNORMAL FINDINGS OF BLOOD CHEMISTRY: ICD-10-CM

## 2025-03-02 DIAGNOSIS — R09.89 OTHER SPECIFIED SYMPTOMS AND SIGNS INVOLVING THE CIRCULATORY AND RESPIRATORY SYSTEMS: ICD-10-CM

## 2025-03-02 DIAGNOSIS — I82.409 ACUTE EMBOLISM AND THROMBOSIS OF UNSPECIFIED DEEP VEINS OF UNSPECIFIED LOWER EXTREMITY: ICD-10-CM

## 2025-03-02 DIAGNOSIS — R26.2 DIFFICULTY IN WALKING, NOT ELSEWHERE CLASSIFIED: ICD-10-CM

## 2025-03-02 DIAGNOSIS — I10 ESSENTIAL (PRIMARY) HYPERTENSION: ICD-10-CM

## 2025-03-02 DIAGNOSIS — M48.00 SPINAL STENOSIS, SITE UNSPECIFIED: ICD-10-CM

## 2025-03-02 DIAGNOSIS — D32.9 BENIGN NEOPLASM OF MENINGES, UNSPECIFIED: ICD-10-CM

## 2025-03-02 DIAGNOSIS — G93.6 CEREBRAL EDEMA: ICD-10-CM

## 2025-03-02 DIAGNOSIS — G93.5 COMPRESSION OF BRAIN: ICD-10-CM

## 2025-03-02 DIAGNOSIS — Z29.9 ENCOUNTER FOR PROPHYLACTIC MEASURES, UNSPECIFIED: ICD-10-CM

## 2025-03-02 DIAGNOSIS — E05.90 THYROTOXICOSIS, UNSPECIFIED WITHOUT THYROTOXIC CRISIS OR STORM: ICD-10-CM

## 2025-03-02 DIAGNOSIS — E78.5 HYPERLIPIDEMIA, UNSPECIFIED: ICD-10-CM

## 2025-03-02 DIAGNOSIS — N18.30 CHRONIC KIDNEY DISEASE, STAGE 3 UNSPECIFIED: ICD-10-CM

## 2025-03-02 DIAGNOSIS — M25.572 PAIN IN LEFT ANKLE AND JOINTS OF LEFT FOOT: ICD-10-CM

## 2025-03-02 DIAGNOSIS — E11.9 TYPE 2 DIABETES MELLITUS WITHOUT COMPLICATIONS: ICD-10-CM

## 2025-03-02 DIAGNOSIS — D64.9 ANEMIA, UNSPECIFIED: ICD-10-CM

## 2025-03-02 LAB
GLUCOSE BLDC GLUCOMTR-MCNC: 301 MG/DL — HIGH (ref 70–99)
GLUCOSE BLDC GLUCOMTR-MCNC: 318 MG/DL — HIGH (ref 70–99)
GLUCOSE BLDC GLUCOMTR-MCNC: 324 MG/DL — HIGH (ref 70–99)
GLUCOSE BLDC GLUCOMTR-MCNC: 373 MG/DL — HIGH (ref 70–99)

## 2025-03-02 PROCEDURE — 70450 CT HEAD/BRAIN W/O DYE: CPT | Mod: 26

## 2025-03-02 PROCEDURE — 99497 ADVNCD CARE PLAN 30 MIN: CPT

## 2025-03-02 PROCEDURE — 93970 EXTREMITY STUDY: CPT | Mod: 26

## 2025-03-02 PROCEDURE — 71275 CT ANGIOGRAPHY CHEST: CPT | Mod: 26

## 2025-03-02 RX ORDER — OXYCODONE HYDROCHLORIDE AND ACETAMINOPHEN 10; 325 MG/1; MG/1
1 TABLET ORAL
Refills: 0 | DISCHARGE

## 2025-03-02 RX ORDER — AMLODIPINE BESYLATE 10 MG/1
1 TABLET ORAL
Refills: 0 | DISCHARGE

## 2025-03-02 RX ORDER — AMLODIPINE BESYLATE 10 MG/1
10 TABLET ORAL DAILY
Refills: 0 | Status: DISCONTINUED | OUTPATIENT
Start: 2025-03-02 | End: 2025-03-06

## 2025-03-02 RX ORDER — INSULIN GLARGINE-YFGN 100 [IU]/ML
7 INJECTION, SOLUTION SUBCUTANEOUS ONCE
Refills: 0 | Status: COMPLETED | OUTPATIENT
Start: 2025-03-02 | End: 2025-03-02

## 2025-03-02 RX ORDER — SITAGLIPTIN 100 MG/1
1 TABLET, FILM COATED ORAL
Refills: 0 | DISCHARGE

## 2025-03-02 RX ORDER — ATORVASTATIN CALCIUM 80 MG/1
1 TABLET, FILM COATED ORAL
Refills: 0 | DISCHARGE

## 2025-03-02 RX ORDER — INSULIN LISPRO 100 U/ML
INJECTION, SOLUTION INTRAVENOUS; SUBCUTANEOUS AT BEDTIME
Refills: 0 | Status: DISCONTINUED | OUTPATIENT
Start: 2025-03-02 | End: 2025-03-06

## 2025-03-02 RX ORDER — MELATONIN 5 MG
3 TABLET ORAL AT BEDTIME
Refills: 0 | Status: DISCONTINUED | OUTPATIENT
Start: 2025-03-02 | End: 2025-03-06

## 2025-03-02 RX ORDER — ACETAMINOPHEN 500 MG/5ML
650 LIQUID (ML) ORAL EVERY 6 HOURS
Refills: 0 | Status: DISCONTINUED | OUTPATIENT
Start: 2025-03-02 | End: 2025-03-06

## 2025-03-02 RX ORDER — LEVETIRACETAM 10 MG/ML
1 INJECTION, SOLUTION INTRAVENOUS
Refills: 0 | DISCHARGE

## 2025-03-02 RX ORDER — INSULIN GLARGINE-YFGN 100 [IU]/ML
15 INJECTION, SOLUTION SUBCUTANEOUS AT BEDTIME
Refills: 0 | Status: DISCONTINUED | OUTPATIENT
Start: 2025-03-02 | End: 2025-03-03

## 2025-03-02 RX ORDER — INSULIN LISPRO 100 U/ML
INJECTION, SOLUTION INTRAVENOUS; SUBCUTANEOUS
Refills: 0 | Status: DISCONTINUED | OUTPATIENT
Start: 2025-03-02 | End: 2025-03-06

## 2025-03-02 RX ORDER — METHIMAZOLE 5 MG
1 TABLET ORAL
Refills: 0 | DISCHARGE

## 2025-03-02 RX ORDER — ENOXAPARIN SODIUM 100 MG/ML
40 INJECTION SUBCUTANEOUS EVERY 24 HOURS
Refills: 0 | Status: DISCONTINUED | OUTPATIENT
Start: 2025-03-02 | End: 2025-03-02

## 2025-03-02 RX ORDER — INSULIN LISPRO 100 U/ML
6 INJECTION, SOLUTION INTRAVENOUS; SUBCUTANEOUS
Refills: 0 | Status: DISCONTINUED | OUTPATIENT
Start: 2025-03-02 | End: 2025-03-03

## 2025-03-02 RX ORDER — ENOXAPARIN SODIUM 100 MG/ML
30 INJECTION SUBCUTANEOUS EVERY 24 HOURS
Refills: 0 | Status: DISCONTINUED | OUTPATIENT
Start: 2025-03-02 | End: 2025-03-06

## 2025-03-02 RX ORDER — LEVETIRACETAM 10 MG/ML
250 INJECTION, SOLUTION INTRAVENOUS
Refills: 0 | Status: DISCONTINUED | OUTPATIENT
Start: 2025-03-02 | End: 2025-03-06

## 2025-03-02 RX ORDER — MAGNESIUM, ALUMINUM HYDROXIDE 200-200 MG
30 TABLET,CHEWABLE ORAL EVERY 4 HOURS
Refills: 0 | Status: DISCONTINUED | OUTPATIENT
Start: 2025-03-02 | End: 2025-03-06

## 2025-03-02 RX ORDER — ATORVASTATIN CALCIUM 80 MG/1
10 TABLET, FILM COATED ORAL AT BEDTIME
Refills: 0 | Status: DISCONTINUED | OUTPATIENT
Start: 2025-03-02 | End: 2025-03-06

## 2025-03-02 RX ORDER — ONDANSETRON HCL/PF 4 MG/2 ML
4 VIAL (ML) INJECTION EVERY 8 HOURS
Refills: 0 | Status: DISCONTINUED | OUTPATIENT
Start: 2025-03-02 | End: 2025-03-06

## 2025-03-02 RX ADMIN — INSULIN LISPRO 6 UNIT(S): 100 INJECTION, SOLUTION INTRAVENOUS; SUBCUTANEOUS at 08:26

## 2025-03-02 RX ADMIN — Medication 50 MILLIGRAM(S): at 02:39

## 2025-03-02 RX ADMIN — INSULIN LISPRO 4: 100 INJECTION, SOLUTION INTRAVENOUS; SUBCUTANEOUS at 11:45

## 2025-03-02 RX ADMIN — AMLODIPINE BESYLATE 10 MILLIGRAM(S): 10 TABLET ORAL at 07:07

## 2025-03-02 RX ADMIN — Medication 50 MILLIGRAM(S): at 07:06

## 2025-03-02 RX ADMIN — ENOXAPARIN SODIUM 30 MILLIGRAM(S): 100 INJECTION SUBCUTANEOUS at 08:25

## 2025-03-02 RX ADMIN — LIDOCAINE HYDROCHLORIDE 1 PATCH: 20 JELLY TOPICAL at 10:21

## 2025-03-02 RX ADMIN — INSULIN LISPRO 2: 100 INJECTION, SOLUTION INTRAVENOUS; SUBCUTANEOUS at 21:57

## 2025-03-02 RX ADMIN — INSULIN LISPRO 6 UNIT(S): 100 INJECTION, SOLUTION INTRAVENOUS; SUBCUTANEOUS at 16:42

## 2025-03-02 RX ADMIN — LIDOCAINE HYDROCHLORIDE 1 PATCH: 20 JELLY TOPICAL at 06:55

## 2025-03-02 RX ADMIN — INSULIN LISPRO 4: 100 INJECTION, SOLUTION INTRAVENOUS; SUBCUTANEOUS at 16:42

## 2025-03-02 RX ADMIN — INSULIN LISPRO 5: 100 INJECTION, SOLUTION INTRAVENOUS; SUBCUTANEOUS at 08:26

## 2025-03-02 RX ADMIN — ATORVASTATIN CALCIUM 10 MILLIGRAM(S): 80 TABLET, FILM COATED ORAL at 21:34

## 2025-03-02 RX ADMIN — LEVETIRACETAM 250 MILLIGRAM(S): 10 INJECTION, SOLUTION INTRAVENOUS at 07:09

## 2025-03-02 RX ADMIN — INSULIN GLARGINE-YFGN 15 UNIT(S): 100 INJECTION, SOLUTION SUBCUTANEOUS at 21:33

## 2025-03-02 RX ADMIN — INSULIN GLARGINE-YFGN 7 UNIT(S): 100 INJECTION, SOLUTION SUBCUTANEOUS at 08:48

## 2025-03-02 RX ADMIN — INSULIN LISPRO 6 UNIT(S): 100 INJECTION, SOLUTION INTRAVENOUS; SUBCUTANEOUS at 11:45

## 2025-03-02 RX ADMIN — Medication 50 MILLIGRAM(S): at 18:07

## 2025-03-02 RX ADMIN — LEVETIRACETAM 250 MILLIGRAM(S): 10 INJECTION, SOLUTION INTRAVENOUS at 18:07
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
## 2025-03-02 NOTE — CONSULT NOTE ADULT - ASSESSMENT
Assessment: 89F w/ PMHx of meningioma (dx 2018), DM2, HTN, OA, spinal stenosis, CKD3, chronic anemia, hyperthyroidism, seizures (Dx Jan 2025) on Keppra presents with ambulatory dysfunction x1 day. Xray of left foot show no acute fracture. CTA PE study negative. UA pending. CTH demonstrates interval enlargment of parafalcine mass, with new mass effect and midline shift.     Plan:  Neuro:  Interval enlargementof hyperdense partially calcified extra-axial   parafalcine mass, currently 4.5 x 3.3 x 4.3 cm, previously 3.2 x 2.2 x   3.0 cm (maximal AP x TV x CC dimensions).    New extensive left frontal vasogenic edema.    New mass effect upon the left frontalhorn. New rightward midline shift   of 2 to 3 mm at the level of the foramen of Monro.    Cardiovascular:    Pulmonary:     Infectious Diseases:    Gastrointestinal:    Renal:    Heme/onc:     Endo:     Skin/ catheter:     Prophylaxis:     Goals of Care:     Dispo:  Assessment: 89F w/ PMHx of meningioma (dx 2018), DM2, HTN, OA, spinal stenosis, CKD3, chronic anemia, hyperthyroidism, seizures (Dx Jan 2025) on Keppra presents with ambulatory dysfunction x1 day. Xray of left foot show no acute fracture. CTA PE study negative. UA pending. CTH demonstrates interval enlargement of parafalcine mass, with new mass effect and midline shift. ICU consulted for concern of mass enlargement and potential worsening in neurologic function.    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  Neurosurgery consulted by Hospitalist and recommended against aggressive interventions, states he will discuss with family further GOC. No steroids recommended. Awaiting formal consult note.  Mass has been present for 7 years, pt has no focal deficits on exam and appears at baseline mental status  - F/u Neurosurgery recommendations  - Consider Neurology eval  - Consult palliative care    Pt to remain on floors. If interval worsening of rsepiratory status or decline in function please re-consult, based on GOC.   Assessment: 89F w/ PMHx of meningioma (dx 2018), DM2, HTN, OA, spinal stenosis, CKD3, chronic anemia, hyperthyroidism, seizures (Dx Jan 2025) on Keppra presents with ambulatory dysfunction x1 day. Xray of left foot show no acute fracture. CTA PE study negative. UA pending. CTH demonstrates interval enlargement of parafalcine mass, with new mass effect and midline shift. ICU consulted for concern of mass enlargement and potential worsening in neurologic function.    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  Neurosurgery consulted by Hospitalist and recommended against aggressive interventions, states he will discuss with family further GOC. No steroids recommended. Awaiting formal consult note.  Mass has been present for 7 years, pt has no focal deficits on exam and appears at baseline mental status  - F/u Neurosurgery recommendations  - Consider Neurology eval  - Consult palliative care  - C/w Kera    No current ICU needs, pt to remain on floors. If interval worsening of respiratory status or decline in function please re-consult as needed.

## 2025-03-02 NOTE — PATIENT PROFILE ADULT - FALL HARM RISK - DEVICES
Patient is a 69-year-old female being assessed with a diagnosis of COPD.    Results    FEV1 2.22 L 92% of predicted with normal Z-score  FVC 3.05 L 98% of predicted with a normal Z-score  Ratio is normal 73%  Following the administration of bronchodilators there is no significant change in FEV1    Diffusion capacity is 94% of predicted corrects to 116% of predicted when alveolar volume is considered.  Total lung capacity 5.45 L 103% of predicted with normal Z-score  Residual volume 2.4 L 113% of predicted with normal Z-score    Impression --no evidence of airways obstruction-and no significant change following the administration of bronchodilators.  This however does not preclude use of bronchodilators as clinically indicated.  Diffusion capacity and lung volumes are normal.    When compared to a study from 6/27/2018-there is been a mild decrease in FEV1 (previously 2.49 L 95% of predicted) and FVC (previously 3.43 liters 103% of predicted)-there is no bronchodilator response at that time.  Lung volumes demonstrate less hyperinflation(previously residual volume 2.99 L 148% of predicted) diffusion is unchanged    
Cane

## 2025-03-02 NOTE — CONSULT NOTE ADULT - ATTENDING COMMENTS
IMP: This is a  89 yr old woman with  meningioma (dx 2018), DM2, HTN, OA, spinal stenosis, CKD3, chronic anemia, hyperthyroidism, seizures (Dx Jan 2025) on Keppra presents with ambulatory dysfunction x1 day. Xray of left foot show no acute fracture. CTA PE study negative. UA pending. CTH demonstrates interval enlargement of parafalcine mass, with new mass effect and midline shift. ICU consulted for concern of mass enlargement and potential worsening in neurologic function.    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  Neurosurgery consulted by Hospitalist ( Dr Larose ) and recommended against aggressive interventions, states he will discuss with family further GOC. No steroids recommended. Awaiting formal consult note.  Mass has been present for 7 years, pt has no focal deficits on exam and appears at baseline mental status  - F/u Neurosurgery recommendations  - Consider Neurology eval  - Consult palliative care  - C/w Kera    No current ICU needs, pt to remain on floors. If interval worsening of respiratory status or decline in function please re-consult as needed.     pat seen  at bedside with PGY-3 and discussed with Dr Larose who is in agreement

## 2025-03-02 NOTE — GOALS OF CARE CONVERSATION - ADVANCED CARE PLANNING - CONVERSATION DETAILS
Patient noted to have significant findings on the CT scan of the brain, including enlarging parafalcine mass with new left frontal vasogenic edema and new mass effect on the left frontal horn with night rightward mid line shift. Given the concerning findings, GOC was discussed with the patient's daughter Alfonso. Explained the new findings to Alfonso, including swelling of the brain and compression of the left frontal horn. Also explained that neurosurgery (Dr. Rayo) had been consulted and given the patient's age and co-morbidities, aggressive intervention was not recommended. Daughter Alfonso reported she had also spoken with Dr. Rayo and understood the situation. I explained that without surgical intervention the mass would continue to get bigger and that life-sustaining measures such as intubation and CPR/shocks would not change this. I explained that unfortunately I could not give a timeline as to when the patient's neurologic status might deteriorate. Alfonso understands and would like to discuss the situation, including DNR/DNI status with her family, including her sister Lorena. She reports they will have a decision tomorrow. For now, patient remains full code.

## 2025-03-02 NOTE — ED ADULT NURSE NOTE - OBJECTIVE STATEMENT
Patient presents to ED with daughter  reporting as per daughter LLE pain + swelling w/ difficulty walking, s/p seizure in January PMH DM, HTN , HLD

## 2025-03-02 NOTE — CONSULT NOTE ADULT - SUBJECTIVE AND OBJECTIVE BOX
Patient is a 89y old  Female who presents with a chief complaint of Ambulatory Dysfunction, r/o PE (02 Mar 2025 13:37)      HPI:  89 yo F w/ hx of DM2, HTN, OA, spinal stenosis, CKD III, chronic anemia, hyperthyroidism, seizures on Keppra presents with ambulatory dysfunction x1 day. Per patient and daughter at bedside, patient was "bent over in pain" and "walking funny" while trying to walk to bathroom this AM. Per daughter, patient normally ambulates with cane and assistance, had no issues day prior. Denies any trauma, fall, LOC, dizziness, or seizure like activity. Patient normally lives with daughter in [place] and is now visiting daughter in [place] after a 2 hour car ride last Saturday. Patient daughter also reports patient had some SOB after walking to bathroom. Of note, patient had recent hospitalization in [place] for fall and was started on seizure medications.    In ED:   VS: /67 / HR 77 / T 97.7 / 96%02 on RA  WBC 13.24  Hgb 9.7  DDimer 2839  s/p solumedrol x1, flexeril x1, benadryl x1 (01 Mar 2025 23:58)    ICU consulted for brain mass and concern for possible worsening of mental status and neurologic compromise.      PAST MEDICAL & SURGICAL HISTORY:  Diabetes mellitus  Type 2 IDDM      Arthritis      Spinal stenosis, lumbar      Dyslipidemia      Anxiety      HTN (hypertension)      Obesity      Spinal stenosis      Cataract      DUB (dysfunctional uterine bleeding)  hysterectomy age 30's      Anemia      Cervical spinal stenosis      History of hysterectomy  age: 30's      History of appendectomy  1960's      History of cholecystectomy  1960's      H/O mastoidectomy  Right ear      Hx of cataract surgery  bilateral 2008      History of total knee replacement  right 2006  left 2011      History of colon polyps removal  colonoscopy 2010      L3- L5 laminectomy with fusion  2/2013          SOCIAL HX:   Smoking                         ETOH                            Other    FAMILY HISTORY:  :  No known cardiovacular family hisotry     ROS:  See HPI     Allergies    vancomycin (Hives)  iodine (Swelling)  IV Contrast (Short breath)  codeine (Rash)  penicillin (Swelling)  aspirin (Stomach Upset)    Intolerances          PHYSICAL EXAM    ICU Vital Signs Last 24 Hrs  T(C): 36.7 (02 Mar 2025 05:25), Max: 36.7 (02 Mar 2025 05:25)  T(F): 98 (02 Mar 2025 05:25), Max: 98 (02 Mar 2025 05:25)  HR: 71 (02 Mar 2025 05:25) (71 - 88)  BP: 160/76 (02 Mar 2025 05:25) (126/69 - 160/76)  BP(mean): --  ABP: --  ABP(mean): --  RR: 16 (02 Mar 2025 05:25) (16 - 18)  SpO2: 97% (02 Mar 2025 05:25) (96% - 97%)    O2 Parameters below as of 02 Mar 2025 05:25  Patient On (Oxygen Delivery Method): room air          General: Not in distress, AOx2  HEENT:  MJ              Lymphatic system: No LN  Lungs: CTA B/L  Cardiovascular: RRR  Gastrointestinal: Soft, Positive BS  Musculoskeletal: No clubbing.  Moves all extremities.   Skin: Warm. Intact.  Neurological: No motor or sensory deficit.        LABS:                          9.7    13.24 )-----------( 220      ( 01 Mar 2025 22:13 )             30.1                                               03-01    137  |  107  |  23[H]  ----------------------------<  263[H]  4.5   |  20[L]  |  1.21    Ca    9.2      01 Mar 2025 22:13    TPro  7.3  /  Alb  3.1[L]  /  TBili  0.5  /  DBili  x   /  AST  39  /  ALT  19  /  AlkPhos  76  03-01      PT/INR - ( 01 Mar 2025 22:13 )   PT: 11.5 sec;   INR: 0.99 ratio         PTT - ( 01 Mar 2025 22:13 )  PTT:30.5 sec                                       Urinalysis Basic - ( 01 Mar 2025 22:13 )    Color: x / Appearance: x / SG: x / pH: x  Gluc: 263 mg/dL / Ketone: x  / Bili: x / Urobili: x   Blood: x / Protein: x / Nitrite: x   Leuk Esterase: x / RBC: x / WBC x   Sq Epi: x / Non Sq Epi: x / Bacteria: x                                                  LIVER FUNCTIONS - ( 01 Mar 2025 22:13 )  Alb: 3.1 g/dL / Pro: 7.3 g/dL / ALK PHOS: 76 U/L / ALT: 19 U/L DA / AST: 39 U/L / GGT: x                                                                                                                                       CXR:    ECHO:    MEDICATIONS  (STANDING):  amLODIPine   Tablet 10 milliGRAM(s) Oral daily  atorvastatin 10 milliGRAM(s) Oral at bedtime  enoxaparin Injectable 30 milliGRAM(s) SubCutaneous every 24 hours  hydrALAZINE 50 milliGRAM(s) Oral two times a day  insulin glargine Injectable (LANTUS) 15 Unit(s) SubCutaneous at bedtime  insulin lispro (ADMELOG) corrective regimen sliding scale   SubCutaneous three times a day before meals  insulin lispro (ADMELOG) corrective regimen sliding scale   SubCutaneous at bedtime  insulin lispro Injectable (ADMELOG) 6 Unit(s) SubCutaneous three times a day before meals  levETIRAcetam 250 milliGRAM(s) Oral two times a day  methimazole 5 milliGRAM(s) Oral daily    MEDICATIONS  (PRN):  acetaminophen     Tablet .. 650 milliGRAM(s) Oral every 6 hours PRN Temp greater or equal to 38C (100.4F), Mild Pain (1 - 3)  aluminum hydroxide/magnesium hydroxide/simethicone Suspension 30 milliLiter(s) Oral every 4 hours PRN Dyspepsia  melatonin 3 milliGRAM(s) Oral at bedtime PRN Insomnia  ondansetron Injectable 4 milliGRAM(s) IV Push every 8 hours PRN Nausea and/or Vomiting         Patient is a 89y old  Female who presents with a chief complaint of Ambulatory Dysfunction, r/o PE (02 Mar 2025 13:37)      HPI:  89 yo F w/ hx of DM2, HTN, OA, spinal stenosis, CKD III, chronic anemia, hyperthyroidism, seizures on Keppra presents with ambulatory dysfunction x1 day. Per patient and daughter at bedside, patient was "bent over in pain" and "walking funny" while trying to walk to bathroom this AM. Per daughter, patient normally ambulates with cane and assistance, had no issues day prior. Denies any trauma, fall, LOC, dizziness, or seizure like activity. Patient normally lives with daughter in [place] and is now visiting daughter in [place] after a 2 hour car ride last Saturday. Patient daughter also reports patient had some SOB after walking to bathroom. Of note, patient had recent hospitalization in [place] for fall and was started on seizure medications.    In ED:   VS: /67 / HR 77 / T 97.7 / 96%02 on RA  WBC 13.24  Hgb 9.7  DDimer 2839  s/p solumedrol x1, flexeril x1, benadryl x1 (01 Mar 2025 23:58)    ICU consulted for brain mass and concern for possible worsening of mental status and neurologic compromise.      PAST MEDICAL & SURGICAL HISTORY:  Diabetes mellitus  Type 2 IDDM      Arthritis      Spinal stenosis, lumbar      Dyslipidemia      Anxiety      HTN (hypertension)      Obesity      Spinal stenosis      Cataract      DUB (dysfunctional uterine bleeding)  hysterectomy age 30's      Anemia      Cervical spinal stenosis      History of hysterectomy  age: 30's      History of appendectomy  1960's      History of cholecystectomy  1960's      H/O mastoidectomy  Right ear      Hx of cataract surgery  bilateral 2008      History of total knee replacement  right 2006  left 2011      History of colon polyps removal  colonoscopy 2010      L3- L5 laminectomy with fusion  2/2013          SOCIAL HX:   Smoking                         ETOH                            Other    FAMILY HISTORY:  :  No known cardiovacular family hisotry     ROS:  See HPI     Allergies    vancomycin (Hives)  iodine (Swelling)  IV Contrast (Short breath)  codeine (Rash)  penicillin (Swelling)  aspirin (Stomach Upset)    Intolerances          PHYSICAL EXAM    ICU Vital Signs Last 24 Hrs  T(C): 36.7 (02 Mar 2025 05:25), Max: 36.7 (02 Mar 2025 05:25)  T(F): 98 (02 Mar 2025 05:25), Max: 98 (02 Mar 2025 05:25)  HR: 71 (02 Mar 2025 05:25) (71 - 88)  BP: 160/76 (02 Mar 2025 05:25) (126/69 - 160/76)  BP(mean): --  ABP: --  ABP(mean): --  RR: 16 (02 Mar 2025 05:25) (16 - 18)  SpO2: 97% (02 Mar 2025 05:25) (96% - 97%)    O2 Parameters below as of 02 Mar 2025 05:25  Patient On (Oxygen Delivery Method): room air          General: Not in distress  HEENT:  MJ              Lymphatic system: No LN  Lungs: CTA B/L  Cardiovascular: RRR, Systolic Murmur present 3/6, best heard in left second intercostal space.  Gastrointestinal: Soft, Positive BS  Musculoskeletal: No clubbing.  Moves all extremities. no peripheral edema  Skin: Warm. Intact.  Neurological: AOx2, No motor or sensory deficit. 5/5 strength in all extremities        LABS:                          9.7    13.24 )-----------( 220      ( 01 Mar 2025 22:13 )             30.1                                               03-01    137  |  107  |  23[H]  ----------------------------<  263[H]  4.5   |  20[L]  |  1.21    Ca    9.2      01 Mar 2025 22:13    TPro  7.3  /  Alb  3.1[L]  /  TBili  0.5  /  DBili  x   /  AST  39  /  ALT  19  /  AlkPhos  76  03-01      PT/INR - ( 01 Mar 2025 22:13 )   PT: 11.5 sec;   INR: 0.99 ratio         PTT - ( 01 Mar 2025 22:13 )  PTT:30.5 sec                                       Urinalysis Basic - ( 01 Mar 2025 22:13 )    Color: x / Appearance: x / SG: x / pH: x  Gluc: 263 mg/dL / Ketone: x  / Bili: x / Urobili: x   Blood: x / Protein: x / Nitrite: x   Leuk Esterase: x / RBC: x / WBC x   Sq Epi: x / Non Sq Epi: x / Bacteria: x                                                  LIVER FUNCTIONS - ( 01 Mar 2025 22:13 )  Alb: 3.1 g/dL / Pro: 7.3 g/dL / ALK PHOS: 76 U/L / ALT: 19 U/L DA / AST: 39 U/L / GGT: x                                                                                                                                       CXR:    ECHO:    MEDICATIONS  (STANDING):  amLODIPine   Tablet 10 milliGRAM(s) Oral daily  atorvastatin 10 milliGRAM(s) Oral at bedtime  enoxaparin Injectable 30 milliGRAM(s) SubCutaneous every 24 hours  hydrALAZINE 50 milliGRAM(s) Oral two times a day  insulin glargine Injectable (LANTUS) 15 Unit(s) SubCutaneous at bedtime  insulin lispro (ADMELOG) corrective regimen sliding scale   SubCutaneous three times a day before meals  insulin lispro (ADMELOG) corrective regimen sliding scale   SubCutaneous at bedtime  insulin lispro Injectable (ADMELOG) 6 Unit(s) SubCutaneous three times a day before meals  levETIRAcetam 250 milliGRAM(s) Oral two times a day  methimazole 5 milliGRAM(s) Oral daily    MEDICATIONS  (PRN):  acetaminophen     Tablet .. 650 milliGRAM(s) Oral every 6 hours PRN Temp greater or equal to 38C (100.4F), Mild Pain (1 - 3)  aluminum hydroxide/magnesium hydroxide/simethicone Suspension 30 milliLiter(s) Oral every 4 hours PRN Dyspepsia  melatonin 3 milliGRAM(s) Oral at bedtime PRN Insomnia  ondansetron Injectable 4 milliGRAM(s) IV Push every 8 hours PRN Nausea and/or Vomiting         Patient is a 89y old  Female who presents with a chief complaint of Ambulatory Dysfunction, r/o PE (02 Mar 2025 13:37)      HPI:  89 yo F w/ hx of DM2, HTN, OA, spinal stenosis, CKD III, chronic anemia, hyperthyroidism, seizures on Keppra presents with ambulatory dysfunction x1 day. Per patient and daughter at bedside, patient was "bent over in pain" and "walking funny" while trying to walk to bathroom this AM. Per daughter, patient normally ambulates with cane and assistance, had no issues day prior. Denies any trauma, fall, LOC, dizziness, or seizure like activity. Patient normally lives with daughter in [place] and is now visiting daughter in [place] after a 2 hour car ride last Saturday. Patient daughter also reports patient had some SOB after walking to bathroom. Of note, patient had recent hospitalization in [place] for fall and was started on seizure medications.    In ED:   VS: /67 / HR 77 / T 97.7 / 96%02 on RA  WBC 13.24  Hgb 9.7  DDimer 2839  s/p solumedrol x1, flexeril x1, benadryl x1 (01 Mar 2025 23:58)    ICU consulted for brain mass and concern for possible worsening of mental status and neurologic compromise.      PAST MEDICAL & SURGICAL HISTORY:  Diabetes mellitus  Type 2 IDDM      Arthritis      Spinal stenosis, lumbar      Dyslipidemia      Anxiety      HTN (hypertension)      Obesity      Spinal stenosis      Cataract      DUB (dysfunctional uterine bleeding)  hysterectomy age 30's      Anemia      Cervical spinal stenosis      History of hysterectomy  age: 30's      History of appendectomy  1960's      History of cholecystectomy  1960's      H/O mastoidectomy  Right ear      Hx of cataract surgery  bilateral 2008      History of total knee replacement  right 2006  left 2011      History of colon polyps removal  colonoscopy 2010      L3- L5 laminectomy with fusion  2/2013          SOCIAL HX:   Smoking                         ETOH                            Other    FAMILY HISTORY:  :  No known cardiovacular family hisotry     ROS:  See HPI     Allergies    vancomycin (Hives)  iodine (Swelling)  IV Contrast (Short breath)  codeine (Rash)  penicillin (Swelling)  aspirin (Stomach Upset)    Intolerances          PHYSICAL EXAM    ICU Vital Signs Last 24 Hrs  T(C): 36.7 (02 Mar 2025 05:25), Max: 36.7 (02 Mar 2025 05:25)  T(F): 98 (02 Mar 2025 05:25), Max: 98 (02 Mar 2025 05:25)  HR: 71 (02 Mar 2025 05:25) (71 - 88)  BP: 160/76 (02 Mar 2025 05:25) (126/69 - 160/76)  BP(mean): --  ABP: --  ABP(mean): --  RR: 16 (02 Mar 2025 05:25) (16 - 18)  SpO2: 97% (02 Mar 2025 05:25) (96% - 97%)    O2 Parameters below as of 02 Mar 2025 05:25  Patient On (Oxygen Delivery Method): room air          General: Not in distress  HEENT:  PERRLA              Lymphatic system: No LN  Lungs: CTA B/L  Cardiovascular: RRR, Systolic Murmur present 3/6, best heard in left second intercostal space.  Gastrointestinal: Soft, Positive BS  Musculoskeletal: No clubbing.  Moves all extremities. no peripheral edema  Skin: Warm. Intact.  Neurological: AOx2, No motor or sensory deficit. 5/5 strength in all extremities        LABS:                          9.7    13.24 )-----------( 220      ( 01 Mar 2025 22:13 )             30.1                                               03-01    137  |  107  |  23[H]  ----------------------------<  263[H]  4.5   |  20[L]  |  1.21    Ca    9.2      01 Mar 2025 22:13    TPro  7.3  /  Alb  3.1[L]  /  TBili  0.5  /  DBili  x   /  AST  39  /  ALT  19  /  AlkPhos  76  03-01      PT/INR - ( 01 Mar 2025 22:13 )   PT: 11.5 sec;   INR: 0.99 ratio         PTT - ( 01 Mar 2025 22:13 )  PTT:30.5 sec                                       Urinalysis Basic - ( 01 Mar 2025 22:13 )    Color: x / Appearance: x / SG: x / pH: x  Gluc: 263 mg/dL / Ketone: x  / Bili: x / Urobili: x   Blood: x / Protein: x / Nitrite: x   Leuk Esterase: x / RBC: x / WBC x   Sq Epi: x / Non Sq Epi: x / Bacteria: x                                                  LIVER FUNCTIONS - ( 01 Mar 2025 22:13 )  Alb: 3.1 g/dL / Pro: 7.3 g/dL / ALK PHOS: 76 U/L / ALT: 19 U/L DA / AST: 39 U/L / GGT: x                                                                                                                                       CXR:    ECHO:    MEDICATIONS  (STANDING):  amLODIPine   Tablet 10 milliGRAM(s) Oral daily  atorvastatin 10 milliGRAM(s) Oral at bedtime  enoxaparin Injectable 30 milliGRAM(s) SubCutaneous every 24 hours  hydrALAZINE 50 milliGRAM(s) Oral two times a day  insulin glargine Injectable (LANTUS) 15 Unit(s) SubCutaneous at bedtime  insulin lispro (ADMELOG) corrective regimen sliding scale   SubCutaneous three times a day before meals  insulin lispro (ADMELOG) corrective regimen sliding scale   SubCutaneous at bedtime  insulin lispro Injectable (ADMELOG) 6 Unit(s) SubCutaneous three times a day before meals  levETIRAcetam 250 milliGRAM(s) Oral two times a day  methimazole 5 milliGRAM(s) Oral daily    MEDICATIONS  (PRN):  acetaminophen     Tablet .. 650 milliGRAM(s) Oral every 6 hours PRN Temp greater or equal to 38C (100.4F), Mild Pain (1 - 3)  aluminum hydroxide/magnesium hydroxide/simethicone Suspension 30 milliLiter(s) Oral every 4 hours PRN Dyspepsia  melatonin 3 milliGRAM(s) Oral at bedtime PRN Insomnia  ondansetron Injectable 4 milliGRAM(s) IV Push every 8 hours PRN Nausea and/or Vomiting

## 2025-03-02 NOTE — PATIENT PROFILE ADULT - FALL HARM RISK - HARM RISK INTERVENTIONS
Patient is requesting a call back from clinical staff to discuss being seen for a Paragard IUD insertion.    Assistance with ambulation/Assistance OOB with selected safe patient handling equipment/Communicate Risk of Fall with Harm to all staff/Discuss with provider need for PT consult/Monitor gait and stability/Provide patient with walking aids - walker, cane, crutches/Reinforce activity limits and safety measures with patient and family/Tailored Fall Risk Interventions/Visual Cue: Yellow wristband and red socks/Bed in lowest position, wheels locked, appropriate side rails in place/Call bell, personal items and telephone in reach/Instruct patient to call for assistance before getting out of bed or chair/Non-slip footwear when patient is out of bed/[place] to call system/Physically safe environment - no spills, clutter or unnecessary equipment/Purposeful Proactive Rounding/Room/bathroom lighting operational, light cord in reach

## 2025-03-02 NOTE — ED ADULT NURSE NOTE - CHIEF COMPLAINT QUOTE
Detail Level: Generalized
Detail Level: Detailed
Detail Level: Zone
as per daughter LLE pain + swelling w/ difficulty walking, s/p seizure in January PMH DM, HTN , HLD

## 2025-03-02 NOTE — ED ADULT NURSE NOTE - NSFALLHARMRISKINTERV_ED_ALL_ED

## 2025-03-03 DIAGNOSIS — Z75.8 OTHER PROBLEMS RELATED TO MEDICAL FACILITIES AND OTHER HEALTH CARE: ICD-10-CM

## 2025-03-03 DIAGNOSIS — Z51.5 ENCOUNTER FOR PALLIATIVE CARE: ICD-10-CM

## 2025-03-03 DIAGNOSIS — R41.0 DISORIENTATION, UNSPECIFIED: ICD-10-CM

## 2025-03-03 DIAGNOSIS — G93.89 OTHER SPECIFIED DISORDERS OF BRAIN: ICD-10-CM

## 2025-03-03 LAB
A1C WITH ESTIMATED AVERAGE GLUCOSE RESULT: 8 % — HIGH (ref 4–5.6)
ANION GAP SERPL CALC-SCNC: 5 MMOL/L — SIGNIFICANT CHANGE UP (ref 5–17)
APPEARANCE UR: ABNORMAL
BACTERIA # UR AUTO: ABNORMAL /HPF
BASOPHILS # BLD AUTO: 0.01 K/UL — SIGNIFICANT CHANGE UP (ref 0–0.2)
BASOPHILS NFR BLD AUTO: 0.1 % — SIGNIFICANT CHANGE UP (ref 0–2)
BILIRUB UR-MCNC: NEGATIVE — SIGNIFICANT CHANGE UP
BUN SERPL-MCNC: 25 MG/DL — HIGH (ref 7–18)
CALCIUM SERPL-MCNC: 9.1 MG/DL — SIGNIFICANT CHANGE UP (ref 8.4–10.5)
CHLORIDE SERPL-SCNC: 109 MMOL/L — HIGH (ref 96–108)
CO2 SERPL-SCNC: 24 MMOL/L — SIGNIFICANT CHANGE UP (ref 22–31)
COLOR SPEC: YELLOW — SIGNIFICANT CHANGE UP
CREAT SERPL-MCNC: 1 MG/DL — SIGNIFICANT CHANGE UP (ref 0.5–1.3)
DIFF PNL FLD: NEGATIVE — SIGNIFICANT CHANGE UP
EGFR: 52 ML/MIN/1.73M2 — LOW
EGFR: 52 ML/MIN/1.73M2 — LOW
EOSINOPHIL # BLD AUTO: 0.02 K/UL — SIGNIFICANT CHANGE UP (ref 0–0.5)
EOSINOPHIL NFR BLD AUTO: 0.1 % — SIGNIFICANT CHANGE UP (ref 0–6)
EPI CELLS # UR: PRESENT
ESTIMATED AVERAGE GLUCOSE: 183 MG/DL — HIGH (ref 68–114)
GLUCOSE BLDC GLUCOMTR-MCNC: 128 MG/DL — HIGH (ref 70–99)
GLUCOSE BLDC GLUCOMTR-MCNC: 135 MG/DL — HIGH (ref 70–99)
GLUCOSE BLDC GLUCOMTR-MCNC: 291 MG/DL — HIGH (ref 70–99)
GLUCOSE BLDC GLUCOMTR-MCNC: 330 MG/DL — HIGH (ref 70–99)
GLUCOSE BLDC GLUCOMTR-MCNC: 49 MG/DL — CRITICAL LOW (ref 70–99)
GLUCOSE BLDC GLUCOMTR-MCNC: 50 MG/DL — CRITICAL LOW (ref 70–99)
GLUCOSE BLDC GLUCOMTR-MCNC: 82 MG/DL — SIGNIFICANT CHANGE UP (ref 70–99)
GLUCOSE SERPL-MCNC: 266 MG/DL — HIGH (ref 70–99)
GLUCOSE UR QL: 500 MG/DL
HCT VFR BLD CALC: 31.2 % — LOW (ref 34.5–45)
HGB BLD-MCNC: 9.8 G/DL — LOW (ref 11.5–15.5)
IMM GRANULOCYTES NFR BLD AUTO: 0.7 % — SIGNIFICANT CHANGE UP (ref 0–0.9)
KETONES UR-MCNC: ABNORMAL MG/DL
LEUKOCYTE ESTERASE UR-ACNC: ABNORMAL
LYMPHOCYTES # BLD AUTO: 1.42 K/UL — SIGNIFICANT CHANGE UP (ref 1–3.3)
LYMPHOCYTES # BLD AUTO: 9.6 % — LOW (ref 13–44)
MAGNESIUM SERPL-MCNC: 1.6 MG/DL — SIGNIFICANT CHANGE UP (ref 1.6–2.6)
MCHC RBC-ENTMCNC: 25.9 PG — LOW (ref 27–34)
MCHC RBC-ENTMCNC: 31.4 G/DL — LOW (ref 32–36)
MCV RBC AUTO: 82.5 FL — SIGNIFICANT CHANGE UP (ref 80–100)
MONOCYTES # BLD AUTO: 0.74 K/UL — SIGNIFICANT CHANGE UP (ref 0–0.9)
MONOCYTES NFR BLD AUTO: 5 % — SIGNIFICANT CHANGE UP (ref 2–14)
NEUTROPHILS # BLD AUTO: 12.51 K/UL — HIGH (ref 1.8–7.4)
NEUTROPHILS NFR BLD AUTO: 84.5 % — HIGH (ref 43–77)
NITRITE UR-MCNC: POSITIVE
NRBC BLD AUTO-RTO: 0 /100 WBCS — SIGNIFICANT CHANGE UP (ref 0–0)
PH UR: 5 — SIGNIFICANT CHANGE UP (ref 5–8)
PHOSPHATE SERPL-MCNC: 2.4 MG/DL — LOW (ref 2.5–4.5)
PLATELET # BLD AUTO: 294 K/UL — SIGNIFICANT CHANGE UP (ref 150–400)
POTASSIUM SERPL-MCNC: 3.8 MMOL/L — SIGNIFICANT CHANGE UP (ref 3.5–5.3)
POTASSIUM SERPL-SCNC: 3.8 MMOL/L — SIGNIFICANT CHANGE UP (ref 3.5–5.3)
PROT UR-MCNC: 100 MG/DL
RBC # BLD: 3.78 M/UL — LOW (ref 3.8–5.2)
RBC # FLD: 15.4 % — HIGH (ref 10.3–14.5)
RBC CASTS # UR COMP ASSIST: 3 /HPF — SIGNIFICANT CHANGE UP (ref 0–4)
SODIUM SERPL-SCNC: 138 MMOL/L — SIGNIFICANT CHANGE UP (ref 135–145)
SP GR SPEC: 1.02 — SIGNIFICANT CHANGE UP (ref 1–1.03)
TSH SERPL-MCNC: 1.41 UU/ML — SIGNIFICANT CHANGE UP (ref 0.34–4.82)
UROBILINOGEN FLD QL: 0.2 MG/DL — SIGNIFICANT CHANGE UP (ref 0.2–1)
WBC # BLD: 14.8 K/UL — HIGH (ref 3.8–10.5)
WBC # FLD AUTO: 14.8 K/UL — HIGH (ref 3.8–10.5)
WBC UR QL: 8 /HPF — HIGH (ref 0–5)

## 2025-03-03 PROCEDURE — 99497 ADVNCD CARE PLAN 30 MIN: CPT | Mod: 25

## 2025-03-03 PROCEDURE — 99223 1ST HOSP IP/OBS HIGH 75: CPT

## 2025-03-03 PROCEDURE — 99233 SBSQ HOSP IP/OBS HIGH 50: CPT

## 2025-03-03 RX ORDER — SOD PHOS DI, MONO/K PHOS MONO 250 MG
1 TABLET ORAL
Refills: 0 | Status: COMPLETED | OUTPATIENT
Start: 2025-03-03 | End: 2025-03-03

## 2025-03-03 RX ORDER — INSULIN GLARGINE-YFGN 100 [IU]/ML
20 INJECTION, SOLUTION SUBCUTANEOUS AT BEDTIME
Refills: 0 | Status: DISCONTINUED | OUTPATIENT
Start: 2025-03-03 | End: 2025-03-04

## 2025-03-03 RX ORDER — INSULIN LISPRO 100 U/ML
7 INJECTION, SOLUTION INTRAVENOUS; SUBCUTANEOUS
Refills: 0 | Status: DISCONTINUED | OUTPATIENT
Start: 2025-03-03 | End: 2025-03-04

## 2025-03-03 RX ADMIN — ATORVASTATIN CALCIUM 10 MILLIGRAM(S): 80 TABLET, FILM COATED ORAL at 21:40

## 2025-03-03 RX ADMIN — Medication 50 MILLIGRAM(S): at 17:41

## 2025-03-03 RX ADMIN — LEVETIRACETAM 250 MILLIGRAM(S): 10 INJECTION, SOLUTION INTRAVENOUS at 17:41

## 2025-03-03 RX ADMIN — AMLODIPINE BESYLATE 10 MILLIGRAM(S): 10 TABLET ORAL at 05:07

## 2025-03-03 RX ADMIN — ENOXAPARIN SODIUM 30 MILLIGRAM(S): 100 INJECTION SUBCUTANEOUS at 07:47

## 2025-03-03 RX ADMIN — Medication 1 TABLET(S): at 11:39

## 2025-03-03 RX ADMIN — LEVETIRACETAM 250 MILLIGRAM(S): 10 INJECTION, SOLUTION INTRAVENOUS at 05:08

## 2025-03-03 RX ADMIN — INSULIN LISPRO 6 UNIT(S): 100 INJECTION, SOLUTION INTRAVENOUS; SUBCUTANEOUS at 07:47

## 2025-03-03 RX ADMIN — Medication 1 TABLET(S): at 16:37

## 2025-03-03 RX ADMIN — INSULIN LISPRO 7 UNIT(S): 100 INJECTION, SOLUTION INTRAVENOUS; SUBCUTANEOUS at 11:41

## 2025-03-03 RX ADMIN — INSULIN LISPRO 3: 100 INJECTION, SOLUTION INTRAVENOUS; SUBCUTANEOUS at 07:46

## 2025-03-03 RX ADMIN — INSULIN LISPRO 4: 100 INJECTION, SOLUTION INTRAVENOUS; SUBCUTANEOUS at 11:40

## 2025-03-03 RX ADMIN — Medication 50 MILLIGRAM(S): at 05:07

## 2025-03-03 RX ADMIN — INSULIN LISPRO 7 UNIT(S): 100 INJECTION, SOLUTION INTRAVENOUS; SUBCUTANEOUS at 16:37

## 2025-03-03 NOTE — CONSULT NOTE ADULT - SUBJECTIVE AND OBJECTIVE BOX
[facility] Geriatric and Palliative Consult Service:  Jaja Ca DO: cell ([phone])  Vincent Salas MD: cell ([phone])  Frankie Finney NP: cell ([phone])   Kenyon yDer SW: cell ([phone])   Jessica Fleischer-Black, MD: cell ([phone])    Can contact any Palliative Team member via [employer] Teams for consults and questions    HPI:  89 yo F w/ hx of DM2, HTN, OA, spinal stenosis, CKD III, chronic anemia, hyperthyroidism, seizures on Keppra presents with ambulatory dysfunction x1 day. Per patient and daughter at bedside, patient was "bent over in pain" and "walking funny" while trying to walk to bathroom this AM. Per daughter, patient normally ambulates with cane and assistance, had no issues day prior. Denies any trauma, fall, LOC, dizziness, or seizure like activity. Patient normally lives with daughter in [place] and is now visiting daughter in [place] after a 2 hour car ride last Saturday. Patient daughter also reports patient had some SOB after walking to bathroom. Of note, patient had recent hospitalization in [place] for fall and was started on seizure medications.    In ED:   VS: /67 / HR 77 / T 97.7 / 96%02 on RA  WBC 13.24  Hgb 9.7  DDimer 2839  s/p solumedrol x1, flexeril x1, benadryl x1 (01 Mar 2025 23:58)      PAST MEDICAL & SURGICAL HISTORY:  Diabetes mellitus  Type 2 IDDM      Arthritis      Spinal stenosis, lumbar      Dyslipidemia      Anxiety      HTN (hypertension)      Obesity      Spinal stenosis      Cataract      DUB (dysfunctional uterine bleeding)  hysterectomy age 30's      Anemia      Cervical spinal stenosis      Mass, brain      History of hysterectomy  age: 30's      History of appendectomy  1960's      History of cholecystectomy  1960's      H/O mastoidectomy  Right ear      Hx of cataract surgery  bilateral 2008      History of total knee replacement  right 2006  left 2011      History of colon polyps removal  colonoscopy 2010      L3- L5 laminectomy with fusion  2/2013          SOCIAL HISTORY:    Admitted from:  home  (with family)  [ none ] Substance abuse, [ none ] Tobacco hx, [ none ] Alcohol hx, [ none ] Home Opioid Hx  Worship: rotestant  Language preferred: English    FAMILY HISTORY:  see H&P for history  Baseline ADLs (prior to admission): Independent with ADLs. Lives with family for assistance with glucose control    Allergies    vancomycin (Hives)  iodine (Swelling)  IV Contrast (Short breath)  codeine (Rash)  penicillin (Swelling)  aspirin (Stomach Upset)    Intolerances      Review of Systems: All others negative  Present Symptoms: Mild  Pain:             Location -                               Aggravating factors -             Quality -             Radiation -             Timing-             Severity (0-10 scale):             Minimal acceptable level (0-10 scale):  Fatigue:  Nausea:  Lack of Appetite:   SOB:  Depression:  Anxiety:  Constipation:     CPOT:    https://ccs-st.org/resources/Documents/Sedation%20Analgesia%20Delirium%20in%20CC/CCS%20STH%20Guideline%20template%20CPOT.pdf  PAIN AD Score:   [email or web address].[email or web address]/painad.pdf (press ctrl +  left click to view)      MEDICATIONS  (STANDING):  amLODIPine   Tablet 10 milliGRAM(s) Oral daily  atorvastatin 10 milliGRAM(s) Oral at bedtime  enoxaparin Injectable 30 milliGRAM(s) SubCutaneous every 24 hours  hydrALAZINE 50 milliGRAM(s) Oral two times a day  insulin glargine Injectable (LANTUS) 20 Unit(s) SubCutaneous at bedtime  insulin lispro (ADMELOG) corrective regimen sliding scale   SubCutaneous three times a day before meals  insulin lispro (ADMELOG) corrective regimen sliding scale   SubCutaneous at bedtime  insulin lispro Injectable (ADMELOG) 7 Unit(s) SubCutaneous three times a day before meals  levETIRAcetam 250 milliGRAM(s) Oral two times a day  methimazole 5 milliGRAM(s) Oral daily  potassium phosphate / sodium phosphate Tablet (K-PHOS No. 2) 1 Tablet(s) Oral four times a day with meals    MEDICATIONS  (PRN):  acetaminophen     Tablet .. 650 milliGRAM(s) Oral every 6 hours PRN Temp greater or equal to 38C (100.4F), Mild Pain (1 - 3)  aluminum hydroxide/magnesium hydroxide/simethicone Suspension 30 milliLiter(s) Oral every 4 hours PRN Dyspepsia  melatonin 3 milliGRAM(s) Oral at bedtime PRN Insomnia  ondansetron Injectable 4 milliGRAM(s) IV Push every 8 hours PRN Nausea and/or Vomiting      PHYSICAL EXAM:  Vital Signs Last 24 Hrs  T(C): 36.3 (03 Mar 2025 13:45), Max: 36.6 (03 Mar 2025 05:13)  T(F): 97.3 (03 Mar 2025 13:45), Max: 97.8 (03 Mar 2025 05:13)  HR: 71 (03 Mar 2025 13:45) (62 - 75)  BP: 138/64 (03 Mar 2025 13:45) (124/69 - 172/79)  BP(mean): 89 (03 Mar 2025 13:45) (89 - 110)  RR: 18 (03 Mar 2025 13:45) (18 - 18)  SpO2: 97% (03 Mar 2025 13:45) (97% - 99%)    Parameters below as of 03 Mar 2025 13:45  Patient On (Oxygen Delivery Method): room air        General: alert  oriented x ____    lethargic distressed cachexia  nonverbal  unarousable verbal    Palliative Performance Scale/Karnofsky Score:  http://npcrc.org/files/news/palliative_performance_scale_ppsv2.pdf    HEENT: no abnormal lesion, dry mouth  ET tube/trach oral lesions:  Lungs: tachypnea/labored breathing, audible excessive secretions  CV: RRR, S1S2, tachycardia  GI: soft non distended non tender  incontinent               PEG/NG/OG tube  constipation  last BM:   : incontinent  oliguria/anuria  castillo  Musculoskeletal: weakness x4 edema x4    ambulatory with assistance   mostly/fully bedbound/wheelchair bound  Skin: no abnormal skin lesions, poor skin turgor, pressure ulcer stage:   Neuro: no deficits, mild cognitive impairment dsyphagia/dysarthria paresis  Oral intake ability: unable/only mouth care, minimal moderate full capability    LABS:                        9.8    14.80 )-----------( 294      ( 03 Mar 2025 05:28 )             31.2     03-03    138  |  109[H]  |  25[H]  ----------------------------<  266[H]  3.8   |  24  |  1.00    Ca    9.1      03 Mar 2025 05:28  Phos  2.4     03-03  Mg     1.6     03-03    TPro  7.3  /  Alb  3.1[L]  /  TBili  0.5  /  DBili  x   /  AST  39  /  ALT  19  /  AlkPhos  76  03-01    Urinalysis Basic - ( 03 Mar 2025 05:28 )    Color: x / Appearance: x / SG: x / pH: x  Gluc: 266 mg/dL / Ketone: x  / Bili: x / Urobili: x   Blood: x / Protein: x / Nitrite: x   Leuk Esterase: x / RBC: x / WBC x   Sq Epi: x / Non Sq Epi: x / Bacteria: x        RADIOLOGY & ADDITIONAL STUDIES: Reviewed

## 2025-03-03 NOTE — CONSULT NOTE ADULT - PROBLEM SELECTOR RECOMMENDATION 3
Family reports AMS last night  Now back to baseline  High risk for delirium    Supportive treatment of delirium: 1) Minimize use of benzodiazepines, opioids, anticholinergics, and antihistamines whenever possible. 2) Maintain regular sleep/wake cycle, optimize nutritional/medical factors.  3)Provide frequent reorientation and redirection.  4) Family member at bedside if possible. 5) Provide corrective lenses if required. 6) Recommend lights and TV off at night time, lights on during the day, and verbal deesculation/redirection if patient to become confused.

## 2025-03-03 NOTE — PHYSICAL THERAPY INITIAL EVALUATION ADULT - ADDITIONAL COMMENTS
Pt is coming from a private house here in [place] with 8 TANIA. Granddaughter states that if pt is DC to home then her and her mother will rearrange home in order to have everything accessible on the first floor. Granddaughter states that pt has a CDPAP that stays with her while she is in her home in [place]. If DC to home pt would be staying with daughter and granddaughter here in [place].

## 2025-03-03 NOTE — PHYSICAL THERAPY INITIAL EVALUATION ADULT - LIVES WITH, PROFILE
Pt has CDPAP with her in her home in [place]. Granddaughter and Daughter will be staying with pt her in [place].

## 2025-03-03 NOTE — CONSULT NOTE ADULT - ASSESSMENT
< from: CT Head No Cont (03.02.25 @ 10:29) >  Interval enlargementof hyperdense partially calcified extra-axial   parafalcine mass, currently 4.5 x 3.3 x 4.3 cm, previously 3.2 x 2.2 x   3.0 cm (maximal AP x TV x CC dimensions).    New extensive left frontal vasogenic edema.    New mass effect upon the left frontalhorn. New rightward midline shift   of 2 to 3 mm at the level of the foramen of Monro.    Basal cisterns visualized. No hydrocephalus.    Dr. Loco discussed these findings with nurse practitioner Benitez on   3/2/2025 11:34 AM with read back.    < end of copied text >   Confusion and gait problems in patient with known meningioma, now with   Interval enlargementof hyperdense partially calcified extra-axial   parafalcine mass, currently 4.5 x 3.3 x 4.3 cm, previously 3.2 x 2.2 x   3.0 cm (maximal AP x TV x CC dimensions) and now with New extensive left frontal vasogenic edema and New mass effect upon the left frontalhorn. New rightward midline shift   of 2 to 3 mm at the level of the foramen of Monro.    NTD as per  neursurgery  will recommend to get records from [place] hospitalization including prior imaging and dose and duration and response to steroids treatment in order to assess if paitent will benefit from steroids for edema and midline shift. will also recommend contribution of steroids rec from ICU service as well.  cw keppra for  now  sz and fall ppx    dw Dr. Hoff and palliative  spent 85min

## 2025-03-03 NOTE — PHYSICAL THERAPY INITIAL EVALUATION ADULT - DIAGNOSIS, PT EVAL
(ICF Model) Pt. present w/deficits in Body Structures/Function (Impairments), incl: Strength, Balance, pain leading to deficits in performing the below noted Activities (Limitations).

## 2025-03-03 NOTE — PHYSICAL THERAPY INITIAL EVALUATION ADULT - GENERAL OBSERVATIONS, REHAB EVAL
Consult received, chart reviewed. Patient received supine in bed, NAD Granddaughter at bedside, + primafit. Patient agreed to EVALUATION from Physical Therapist.

## 2025-03-03 NOTE — CONSULT NOTE ADULT - TIME BILLING
This includes chart review, patient assessment, discussion and collaboration with interdisciplinary team members, excluding Advanced Care Planning.

## 2025-03-03 NOTE — CONSULT NOTE ADULT - ASSESSMENT
89 yo F w/ hx of DM2, HTN, OA, spinal stenosis, CKD III, chronic anemia, hyperthyroidism, seizures on Keppra presents with ambulatory dysfunction x1 day. Found to have brain mass with vasogenic edema and midline shift on CT head.

## 2025-03-03 NOTE — PHYSICAL THERAPY INITIAL EVALUATION ADULT - WEIGHT-BEARING RESTRICTIONS: SIT/STAND, REHAB EVAL
50% L LE. Pt is non compliant with PWB L LE instructions, however does not complain of increased pain when performing transfers with WBAT./partial weight-bearing

## 2025-03-03 NOTE — PHYSICAL THERAPY INITIAL EVALUATION ADULT - PERTINENT HX OF CURRENT PROBLEM, REHAB EVAL
pt admitted on 3/1/25 for Ambulatory dysfunction and r/o PE. Pt states that she is feeling better since admission however is a little lethargic. US shows no DVT in L LE. CT angio chest shows age indeterminate mild L1 compression fracture.

## 2025-03-03 NOTE — PHYSICAL THERAPY INITIAL EVALUATION ADULT - WEIGHT-BEARING RESTRICTIONS, REHAB EVAL
50% L LE. Pt is non compliant with PWB L LE instructions, however does not complain of increased pain when negotiating stairs with WBAT./partial weight-bearing

## 2025-03-03 NOTE — PROGRESS NOTE ADULT - SUBJECTIVE AND OBJECTIVE BOX
Patient is a 89y old  Female who presents with a chief complaint of Ambulatory Dysfunction, r/o PE (02 Mar 2025 13:39)      INTERVAL HPI/OVERNIGHT EVENTS: no new complaints    MEDICATIONS  (STANDING):  amLODIPine   Tablet 10 milliGRAM(s) Oral daily  atorvastatin 10 milliGRAM(s) Oral at bedtime  enoxaparin Injectable 30 milliGRAM(s) SubCutaneous every 24 hours  hydrALAZINE 50 milliGRAM(s) Oral two times a day  insulin glargine Injectable (LANTUS) 20 Unit(s) SubCutaneous at bedtime  insulin lispro (ADMELOG) corrective regimen sliding scale   SubCutaneous three times a day before meals  insulin lispro (ADMELOG) corrective regimen sliding scale   SubCutaneous at bedtime  insulin lispro Injectable (ADMELOG) 7 Unit(s) SubCutaneous three times a day before meals  levETIRAcetam 250 milliGRAM(s) Oral two times a day  methimazole 5 milliGRAM(s) Oral daily  potassium phosphate / sodium phosphate Tablet (K-PHOS No. 2) 1 Tablet(s) Oral four times a day with meals    MEDICATIONS  (PRN):  acetaminophen     Tablet .. 650 milliGRAM(s) Oral every 6 hours PRN Temp greater or equal to 38C (100.4F), Mild Pain (1 - 3)  aluminum hydroxide/magnesium hydroxide/simethicone Suspension 30 milliLiter(s) Oral every 4 hours PRN Dyspepsia  melatonin 3 milliGRAM(s) Oral at bedtime PRN Insomnia  ondansetron Injectable 4 milliGRAM(s) IV Push every 8 hours PRN Nausea and/or Vomiting      __________________________________________________  REVIEW OF SYSTEMS:    CONSTITUTIONAL: No fever,   EYES: no acute visual disturbances  NECK: No pain or stiffness  RESPIRATORY: No cough; No shortness of breath  CARDIOVASCULAR: No chest pain, no palpitations  GASTROINTESTINAL: No pain. No nausea or vomiting; No diarrhea   NEUROLOGICAL: No headache or numbness, no tremors  MUSCULOSKELETAL: No joint pain, no muscle pain  GENITOURINARY: no dysuria, no frequency, no hesitancy  PSYCHIATRY: no depression , no anxiety  ALL OTHER  ROS negative      Vital Signs Last 24 Hrs  T(C): 36.6 (03 Mar 2025 05:13), Max: 36.6 (03 Mar 2025 05:13)  T(F): 97.8 (03 Mar 2025 05:13), Max: 97.8 (03 Mar 2025 05:13)  HR: 62 (03 Mar 2025 05:13) (62 - 75)  BP: 172/79 (03 Mar 2025 05:13) (150/65 - 172/79)  BP(mean): 110 (03 Mar 2025 05:13) (97 - 110)  RR: 18 (03 Mar 2025 05:13) (18 - 18)  SpO2: 97% (03 Mar 2025 05:13) (97% - 97%)    Parameters below as of 03 Mar 2025 05:13  Patient On (Oxygen Delivery Method): room air        ________________________________________________  PHYSICAL EXAM:  GENERAL: NAD  HEENT: Normocephalic;  conjunctivae and sclerae clear; moist mucous membranes;   NECK : supple  CHEST/LUNG: Clear to auscultation bilaterally with good air entry   HEART: S1 S2  regular; no murmurs, gallops or rubs  ABDOMEN: Soft, Nontender, Nondistended; Bowel sounds present  EXTREMITIES: no cyanosis; no edema; no calf tenderness  SKIN: warm and dry; no rash  NERVOUS SYSTEM:  Awake and alert; Oriented  to place, person and time ; no new deficits    _________________________________________________  LABS:                        9.8    14.80 )-----------( 294      ( 03 Mar 2025 05:28 )             31.2     03-03    138  |  109[H]  |  25[H]  ----------------------------<  266[H]  3.8   |  24  |  1.00    Ca    9.1      03 Mar 2025 05:28  Phos  2.4     03-03  Mg     1.6     03-03    TPro  7.3  /  Alb  3.1[L]  /  TBili  0.5  /  DBili  x   /  AST  39  /  ALT  19  /  AlkPhos  76  03-01    PT/INR - ( 01 Mar 2025 22:13 )   PT: 11.5 sec;   INR: 0.99 ratio         PTT - ( 01 Mar 2025 22:13 )  PTT:30.5 sec  Urinalysis Basic - ( 03 Mar 2025 05:28 )    Color: x / Appearance: x / SG: x / pH: x  Gluc: 266 mg/dL / Ketone: x  / Bili: x / Urobili: x   Blood: x / Protein: x / Nitrite: x   Leuk Esterase: x / RBC: x / WBC x   Sq Epi: x / Non Sq Epi: x / Bacteria: x      CAPILLARY BLOOD GLUCOSE      POCT Blood Glucose.: 291 mg/dL (03 Mar 2025 07:33)  POCT Blood Glucose.: 318 mg/dL (02 Mar 2025 21:31)  POCT Blood Glucose.: 301 mg/dL (02 Mar 2025 16:23)  POCT Blood Glucose.: 324 mg/dL (02 Mar 2025 11:34)      RADIOLOGY & ADDITIONAL TESTS:    Imaging Personally Reviewed:  YES/NO    Consultant(s) Notes Reviewed:   YES/ No    Care Discussed with Consultants :     Plan of care was discussed with patient and /or primary care giver; all questions and concerns were addressed and care was aligned with patient's wishes.       Patient is a 89y old  Female who presents with a chief complaint of Ambulatory Dysfunction, r/o PE (02 Mar 2025 13:39)      INTERVAL HPI/OVERNIGHT EVENTS: Pt seen with no new complaints    MEDICATIONS  (STANDING):  amLODIPine   Tablet 10 milliGRAM(s) Oral daily  atorvastatin 10 milliGRAM(s) Oral at bedtime  enoxaparin Injectable 30 milliGRAM(s) SubCutaneous every 24 hours  hydrALAZINE 50 milliGRAM(s) Oral two times a day  insulin glargine Injectable (LANTUS) 20 Unit(s) SubCutaneous at bedtime  insulin lispro (ADMELOG) corrective regimen sliding scale   SubCutaneous three times a day before meals  insulin lispro (ADMELOG) corrective regimen sliding scale   SubCutaneous at bedtime  insulin lispro Injectable (ADMELOG) 7 Unit(s) SubCutaneous three times a day before meals  levETIRAcetam 250 milliGRAM(s) Oral two times a day  methimazole 5 milliGRAM(s) Oral daily  potassium phosphate / sodium phosphate Tablet (K-PHOS No. 2) 1 Tablet(s) Oral four times a day with meals    MEDICATIONS  (PRN):  acetaminophen     Tablet .. 650 milliGRAM(s) Oral every 6 hours PRN Temp greater or equal to 38C (100.4F), Mild Pain (1 - 3)  aluminum hydroxide/magnesium hydroxide/simethicone Suspension 30 milliLiter(s) Oral every 4 hours PRN Dyspepsia  melatonin 3 milliGRAM(s) Oral at bedtime PRN Insomnia  ondansetron Injectable 4 milliGRAM(s) IV Push every 8 hours PRN Nausea and/or Vomiting  __________________________________________________  REVIEW OF SYSTEMS:    CONSTITUTIONAL: No fever,   EYES: no acute visual disturbances  NECK: No pain or stiffness  RESPIRATORY: No cough; No shortness of breath  CARDIOVASCULAR: No chest pain, no palpitations  GASTROINTESTINAL: No pain. No nausea or vomiting; No diarrhea   NEUROLOGICAL: No headache or numbness, no tremors  MUSCULOSKELETAL: No joint pain, no muscle pain  GENITOURINARY: no dysuria, no frequency, no hesitancy  PSYCHIATRY: no depression , no anxiety  ALL OTHER  ROS negative      Vital Signs Last 24 Hrs  T(C): 36.6 (03 Mar 2025 05:13), Max: 36.6 (03 Mar 2025 05:13)  T(F): 97.8 (03 Mar 2025 05:13), Max: 97.8 (03 Mar 2025 05:13)  HR: 62 (03 Mar 2025 05:13) (62 - 75)  BP: 172/79 (03 Mar 2025 05:13) (150/65 - 172/79)  BP(mean): 110 (03 Mar 2025 05:13) (97 - 110)  RR: 18 (03 Mar 2025 05:13) (18 - 18)  SpO2: 97% (03 Mar 2025 05:13) (97% - 97%)    Parameters below as of 03 Mar 2025 05:13  Patient On (Oxygen Delivery Method): room air  ________________________________________________  PHYSICAL EXAM:  GENERAL: NAD  HEENT: Normocephalic;  conjunctivae and sclerae clear; moist mucous membranes;   NECK : supple  CHEST/LUNG: Clear to auscultation bilaterally with good air entry   HEART: S1 S2  regular; no murmurs, gallops or rubs  ABDOMEN: Soft, Nontender, Nondistended; Bowel sounds present  EXTREMITIES: no cyanosis; no edema; no calf tenderness  SKIN: warm and dry; no rash  NERVOUS SYSTEM:  Awake and alert; Oriented  to place, person and time ; no new deficits    _________________________________________________  LABS:                        9.8    14.80 )-----------( 294      ( 03 Mar 2025 05:28 )             31.2     03-03    138  |  109[H]  |  25[H]  ----------------------------<  266[H]  3.8   |  24  |  1.00    Ca    9.1      03 Mar 2025 05:28  Phos  2.4     03-03  Mg     1.6     03-03    TPro  7.3  /  Alb  3.1[L]  /  TBili  0.5  /  DBili  x   /  AST  39  /  ALT  19  /  AlkPhos  76  03-01    PT/INR - ( 01 Mar 2025 22:13 )   PT: 11.5 sec;   INR: 0.99 ratio         PTT - ( 01 Mar 2025 22:13 )  PTT:30.5 sec  Urinalysis Basic - ( 03 Mar 2025 05:28 )    Color: x / Appearance: x / SG: x / pH: x  Gluc: 266 mg/dL / Ketone: x  / Bili: x / Urobili: x   Blood: x / Protein: x / Nitrite: x   Leuk Esterase: x / RBC: x / WBC x   Sq Epi: x / Non Sq Epi: x / Bacteria: x    CAPILLARY BLOOD GLUCOSE    POCT Blood Glucose.: 291 mg/dL (03 Mar 2025 07:33)  POCT Blood Glucose.: 318 mg/dL (02 Mar 2025 21:31)  POCT Blood Glucose.: 301 mg/dL (02 Mar 2025 16:23)  POCT Blood Glucose.: 324 mg/dL (02 Mar 2025 11:34)    RADIOLOGY & ADDITIONAL TESTS:   < from: US Duplex Venous Lower Ext Complete, Bilateral (03.02.25 @ 11:04) >  ACC: [id number] EXAM:  US DPLX LWR EXT VEINS COMPL BI   ORDERED BY: WENDY HILLMAN     PROCEDURE DATE:  03/02/2025          INTERPRETATION:  CLINICAL INFORMATION: Elevated d-dimer, evaluate for DVT.    COMPARISON: Bilateral leg venous duplex ultrasound 9/30/2011. Right leg   venous duplex ultrasound 4/16/2013.    TECHNIQUE: Duplex sonography of the BILATERAL LOWER extremity veins with   color and spectral Doppler, with and without compression.    FINDINGS:    RIGHT:  Normal compressibility of theRIGHT common femoral, femoral and popliteal   veins. Doppler examination shows normal spontaneous and phasic flow. No   RIGHT calf vein thrombosis is detected.    LEFT:  Normal compressibility of the LEFT common femoral, femoral and popliteal   veins. Doppler examination shows normal spontaneous and phasic flow. No   LEFT calf vein thrombosis is detected.    IMPRESSION:    No acute DVT of the right or left lower extremity.    --- End of Report ---    < end of copied text >  < from: CT Head No Cont (03.02.25 @ 10:29) >  ACC: [id number] EXAM:  CT BRAIN   ORDERED BY: EDEL MAYS     PROCEDURE DATE:  03/02/2025          INTERPRETATION:  Noncontrast CT of the brain.    CLINICAL INDICATION:  New delirium, history of meningioma.    TECHNIQUE : Axial CT scanning of the brainwas obtained from the skull   base to the vertex without the administration of intravenous contrast.   Sagittal and coronal reformats were provided.    COMPARISON: MRI brain 10/7/2018.    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    New extensive left frontal vasogenic edema.    New mass effect upon the left frontal horn. New rightward midline shift   of 2 to 3 mm at the level of the foramen of Monro.    Basal cisterns visualized. No hydrocephalus.    No acute intracranial hemorrhage.    The visualized paranasal sinuses and mastoid air cells are clear.    IMPRESSION:    Interval enlargementof hyperdense partially calcified extra-axial   parafalcine mass, currently 4.5 x 3.3 x 4.3 cm, previously 3.2 x 2.2 x   3.0 cm (maximal AP x TV x CC dimensions).    New extensive left frontal vasogenic edema.    New mass effect upon the left frontalhorn. New rightward midline shift   of 2 to 3 mm at the level of the foramen of Monro.    Basal cisterns visualized. No hydrocephalus.    Dr. Loco discussed these findings with nurse practitioner Benitez on   3/2/2025 11:34 AM with read back.    --- End of Report ---        < end of copied text >  < from: CT Angio Chest PE Protocol w/ IV Cont (03.02.25 @ 03:58) >  ACC: [id number] EXAM:  CT ANGIO CHEST PULM [facility]   ORDERED BY: ARTEMIO RIVAS     PROCEDURE DATE:  03/02/2025          INTERPRETATION:  CLINICAL INFORMATION: Left ankle pain. Patient has been   ambulatory with a cane however since yesterday patienthas had left foot   pain at the dorsal aspect and has had difficulty bearing weight. Denies   falls, LOC, blood thinners, trauma. Patient states she feels short of   breath intermittently while walking as well.    COMPARISON: None.    CONTRAST/COMPLICATIONS:  IV Contrast: Omnipaque 350  40 cc administered   60 cc discarded  Oral Contrast: NONE  .    PROCEDURE:  CT Angiography of the Chest.  Sagittal and coronal reformats were performed as well as 3D (MIP)   reconstructions.    FINDINGS:    LUNGS AND LARGE AIRWAYS: Patent central airways. No consolidation. Mild   bibasilar dependent atelectasis. No suspicious nodule.  PLEURA: No pleural effusion.  VESSELS: Adequate opacification of the pulmonary vasculature, however,   motion artifact present which limits evaluation of the distal pulmonary   vasculature. No main, lobar or segmental filling defect to indicate   pulmonary embolism. The main pulmonary artery and thoracic aorta are   normal in caliber. No gross evidence for dissection.  HEART:Heart size is mildly enlarged with left ventricular hypertrophy.   No pericardial effusion. Coronary artery calcifications.  MEDIASTINUM AND JESSE: No lymphadenopathy.  CHEST WALL AND LOWER NECK: Within normal limits.  VISUALIZED UPPER ABDOMEN: Gallbladder is not visualized. Mild intra and   extrahepatic biliary duct dilatation likely due to the cholecystectomy   state. Transverse and descending colon diverticulosis. No wall thickening   or inflammatory changes. Bilateral renal cysts.  BONES: Severe chronic appearing compression fracture deformity of T4.   Age-indeterminate mild L1 compression fracture deformity with slight   retropulsion of bone into the epidural space. Degenerative changes.    IMPRESSION:  Limited by motion artifact. No main, lobar or segmental pulmonary   embolism. No evidence of pneumonia.  Age-indeterminate mild L1 compression fracture.      --- End of Report ---    < from: Xray Chest 1 View- PORTABLE-Urgent (Xray Chest 1 View- PORTABLE-Urgent .) (03.01.25 @ 23:17) >  ACC: [id number] EXAM:  XR CHEST PORTABLE URGENT 1V   ORDERED BY: ARTEMIO RIVAS     PROCEDURE DATE:  03/01/2025        INTERPRETATION:  Chest portable    CLINICAL HISTORY: Rule out pneumonia    Comparison:  6/27/2013    IMPRESSION: cardiomegaly with tortuous thoracic aorta and great vessels.   Lungs clear with linear atelectasis/fibrosis at the left base. In the   correct clinical setting a developing left basilar pneumonia cannot be   definitively excluded. Follow-up radiographs are suggested. Angles sharp.   Bones without acute abnormality.      --- End of Report ---    Imaging Personally Reviewed:  YES    Consultant(s) Notes Reviewed:   YES    Care Discussed with Consultants : YES     Plan of care was discussed with patient and /or primary care giver; all questions and concerns were addressed and care was aligned with patient's wishes.

## 2025-03-03 NOTE — PHYSICAL THERAPY INITIAL EVALUATION ADULT - GAIT DEVIATIONS NOTED, PT EVAL
decreased nia/increased time in double stance/decreased velocity of limb motion/decreased step length/decreased stride length/decreased weight-shifting ability

## 2025-03-03 NOTE — CONSULT NOTE ADULT - CONVERSATION DETAILS
Spoke at bedside, face to face,  with Mrs. Crews, Her daughter Alfonso and GD Cecily Hickman    We discussed resuscitation including CPR and intubation. Mrs. Eid was clear that she would want resuscitative efforts and a trial of critical care. I asked about HD and PEG tube and she would not want those. SHe trusts her children to make choices on her behalf if she is unable to speak for herself. I completed a MOLST, which Mrs. Eid signed. Original was placed in the chart and a copy given to Alfonso.       As she had never filled out a Health [employer] Proxy, we did so and she named Alfonso Eid as HCP with her other daughter, Lorena Hickman, as alternate. Copy placed in the chart and another given to Alfonso.     All questions answered and support given.

## 2025-03-03 NOTE — CONSULT NOTE ADULT - PROBLEM SELECTOR RECOMMENDATION 4
Gordon Eid is able to make healthcare decisions on her own behalf  HCP is Alfonso Crews with Lorena Hickman as alternate    FULL CODE with trial of critical care    No PEG or HD    MOLST and HCP form completed and placed in chart and copy given to Alfonso morris/w Dr. Larose    Palliative care will sign off. Please reconsult if needed.

## 2025-03-03 NOTE — CONSULT NOTE ADULT - SUBJECTIVE AND OBJECTIVE BOX
****TEMPLATE ONLY****      Patient is a 89y old  Female who presents with a chief complaint of Ambulatory Dysfunction, r/o PE (03 Mar 2025 08:59)      HPI:  89 yo F w/ hx of DM2, HTN, OA, spinal stenosis, CKD III, chronic anemia, hyperthyroidism, seizures on Keppra presents with ambulatory dysfunction x1 day. Per patient and daughter at bedside, patient was "bent over in pain" and "walking funny" while trying to walk to bathroom this AM. Per daughter, patient normally ambulates with cane and assistance, had no issues day prior. Denies any trauma, fall, LOC, dizziness, or seizure like activity. Patient normally lives with daughter in [place] and is now visiting daughter in [place] after a 2 hour car ride last Saturday. Patient daughter also reports patient had some SOB after walking to bathroom. Of note, patient had recent hospitalization in [place] for fall and was started on seizure medications.    In ED:   VS: /67 / HR 77 / T 97.7 / 96%02 on RA  WBC 13.24  Hgb 9.7  DDimer 2839  s/p solumedrol x1, flexeril x1, benadryl x1 (01 Mar 2025 23:58)         Neurological Review of Systems:  No difficulty with language.  No vision loss or double vision.  No dizziness, vertigo or new hearing loss.  No difficulty with speech or swallowing.  No focal weakness.  No focal sensory changes.  No numbness or tingling in the bilateral lower extremities.  No difficulty with balance.  No difficulty with ambulation.        MEDICATIONS  (STANDING):  amLODIPine   Tablet 10 milliGRAM(s) Oral daily  atorvastatin 10 milliGRAM(s) Oral at bedtime  enoxaparin Injectable 30 milliGRAM(s) SubCutaneous every 24 hours  hydrALAZINE 50 milliGRAM(s) Oral two times a day  insulin glargine Injectable (LANTUS) 20 Unit(s) SubCutaneous at bedtime  insulin lispro (ADMELOG) corrective regimen sliding scale   SubCutaneous three times a day before meals  insulin lispro (ADMELOG) corrective regimen sliding scale   SubCutaneous at bedtime  insulin lispro Injectable (ADMELOG) 7 Unit(s) SubCutaneous three times a day before meals  levETIRAcetam 250 milliGRAM(s) Oral two times a day  methimazole 5 milliGRAM(s) Oral daily  potassium phosphate / sodium phosphate Tablet (K-PHOS No. 2) 1 Tablet(s) Oral four times a day with meals    MEDICATIONS  (PRN):  acetaminophen     Tablet .. 650 milliGRAM(s) Oral every 6 hours PRN Temp greater or equal to 38C (100.4F), Mild Pain (1 - 3)  aluminum hydroxide/magnesium hydroxide/simethicone Suspension 30 milliLiter(s) Oral every 4 hours PRN Dyspepsia  melatonin 3 milliGRAM(s) Oral at bedtime PRN Insomnia  ondansetron Injectable 4 milliGRAM(s) IV Push every 8 hours PRN Nausea and/or Vomiting    Allergies    vancomycin (Hives)  iodine (Swelling)  IV Contrast (Short breath)  codeine (Rash)  penicillin (Swelling)  aspirin (Stomach Upset)    Intolerances      PAST MEDICAL & SURGICAL HISTORY:  Diabetes mellitus  Type 2 IDDM      Arthritis      Spinal stenosis, lumbar      Dyslipidemia      Anxiety      HTN (hypertension)      Obesity      Spinal stenosis      Cataract      DUB (dysfunctional uterine bleeding)  hysterectomy age 30's      Anemia      Cervical spinal stenosis      History of hysterectomy  age: 30's      History of appendectomy  1960's      History of cholecystectomy  1960's      H/O mastoidectomy  Right ear      Hx of cataract surgery  bilateral 2008      History of total knee replacement  right 2006  left 2011      History of colon polyps removal  colonoscopy 2010      L3- L5 laminectomy with fusion  2/2013        FAMILY HISTORY:    SOCIAL HISTORY: non smoker/ former smoker/ active smoker    Review of Systems:  Constitutional: No generalized weakness. No fevers or chills.                    Eyes, Ears, Mouth, Throat: No vision loss   Respiratory: No shortness of breath or cough.                                Cardiovascular: No chest pain or palpitations  Gastrointestinal: No nausea or vomiting.                                         Genitourinary: No urinary incontinence or burning on urination.  Musculoskeletal: No joint pain.                                                           Dermatologic: No rash.  Neurological: as per HPI                                                                      Psychiatric: No behavioral problems.  Endocrine: No known hypoglycemia.               Hematologic/Lymphatic: No easy bleeding.    O:  Vital Signs Last 24 Hrs  T(C): 36.6 (03 Mar 2025 05:13), Max: 36.6 (03 Mar 2025 05:13)  T(F): 97.8 (03 Mar 2025 05:13), Max: 97.8 (03 Mar 2025 05:13)  HR: 62 (03 Mar 2025 05:13) (62 - 75)  BP: 172/79 (03 Mar 2025 05:13) (150/65 - 172/79)  BP(mean): 110 (03 Mar 2025 05:13) (97 - 110)  RR: 18 (03 Mar 2025 05:13) (18 - 18)  SpO2: 97% (03 Mar 2025 05:13) (97% - 97%)    Parameters below as of 03 Mar 2025 05:13  Patient On (Oxygen Delivery Method): room air        General Exam:   General appearance: No acute distress                 Cardiovascular: Pedal dorsalis pulses intact bilaterally    Mental Status: Orientated to self, date and place.  Attention intact.  No dysarthria, aphasia or neglect.  Knowledge intact.  Registration intact.  Short and long term memory grossly intact.      Cranial Nerves: CN I - not tested.  PERRL, EOMI, VFF, no nystagmus or diplopia.  No APD.  Fundi not visualized.  CN V1-3 intact to light touch and pinprick.  No facial asymmetry.  Hearing intact to finger rub bilaterally.  Tongue, uvula and palate midline.  Sternocleidomastoid and Trapezius intact bilaterally.    Motor:   Tone: normal.                  Strength intact throughout  No pronator drift bilaterally                      No dysmetria on finger-nose-finger or heel-shin-heel  No truncal ataxia.  No resting, postural or action tremor.  No myoclonus.    Sensation: intact to light touch, pinprick, vibration and proprioception    Deep Tendon Reflexes: 1+ bilateral biceps, triceps, brachioradialis, knee and ankle  Toes flexor bilaterally    Gait: normal and stable.  Rhomberg -jaison.    Other:     LABS:                        9.8    14.80 )-----------( 294      ( 03 Mar 2025 05:28 )             31.2     03-03    138  |  109[H]  |  25[H]  ----------------------------<  266[H]  3.8   |  24  |  1.00    Ca    9.1      03 Mar 2025 05:28  Phos  2.4     03-03  Mg     1.6     03-03    TPro  7.3  /  Alb  3.1[L]  /  TBili  0.5  /  DBili  x   /  AST  39  /  ALT  19  /  AlkPhos  76  03-01    PT/INR - ( 01 Mar 2025 22:13 )   PT: 11.5 sec;   INR: 0.99 ratio         PTT - ( 01 Mar 2025 22:13 )  PTT:30.5 sec  Urinalysis Basic - ( 03 Mar 2025 05:28 )    Color: x / Appearance: x / SG: x / pH: x  Gluc: 266 mg/dL / Ketone: x  / Bili: x / Urobili: x   Blood: x / Protein: x / Nitrite: x   Leuk Esterase: x / RBC: x / WBC x   Sq Epi: x / Non Sq Epi: x / Bacteria: x          RADIOLOGY & ADDITIONAL STUDIES:    < from: CT Head No Cont (03.02.25 @ 10:29) >  Interval enlargementof hyperdense partially calcified extra-axial   parafalcine mass, currently 4.5 x 3.3 x 4.3 cm, previously 3.2 x 2.2 x   3.0 cm (maximal AP x TV x CC dimensions).    New extensive left frontal vasogenic edema.    New mass effect upon the left frontalhorn. New rightward midline shift   of 2 to 3 mm at the level of the foramen of Monro.    Basal cisterns visualized. No hydrocephalus.    Dr. Loco discussed these findings with nurse practitioner Benitez on   3/2/2025 11:34 AM with read back.    < end of copied text >    < from: MR Head w/wo IV Cont (10.07.18 @ 15:25) >  3.3 cm avidly enhancing left parasagittal frontal extra-axial mass with   adjacent vasogenic edema and local mass effect. Findings most consistent   with a meningioma.    T1 isointense, T2 and FLAIR hyperintense, enhancing lesion in the high   left anterior parietal bone which corresponds to a lucent lesion seen on   the prior CT, likely representing an hemangioma.    No abnormal parenchymal or leptomeningeal enhancement.    < end of copied text >       Patient is a 89y old  Female who presents with a chief complaint of Ambulatory Dysfunction, r/o PE (03 Mar 2025 08:59)      HPI:  89 yo F w/ hx of DM2, HTN, OA, spinal stenosis, CKD III, chronic anemia, hyperthyroidism, seizures on Keppra presents with ambulatory dysfunction x1 day. Per patient and daughter at bedside, patient was "bent over in pain" and "walking funny" while trying to walk to bathroom this AM. Per daughter, patient normally ambulates with cane and assistance, had no issues day prior.  She has been also noted to be confused.   Denies any trauma, fall, LOC, dizziness, or seizure like activity. Patient normally lives with daughter in [place] and is now visiting daughter in [place].  She was recently seen in [place] hospital and treated for seizures, she has presumely  gotten steroids there, unknown dose duration or when.     Patient daughter also reports patient had some SOB after walking to bathroom. Of note, patient had recent hospitalization in [place] for fall and was started on seizure medications.    In ED:   VS: /67 / HR 77 / T 97.7 / 96%02 on RA  WBC 13.24  Hgb 9.7  DDimer 2839  s/p solumedrol x1, flexeril x1, benadryl x1 (01 Mar 2025 23:58)     Neurological Review of Systems:  No difficulty with language.  No vision loss or double vision.  No dizziness, vertigo or new hearing loss.  No difficulty with speech or swallowing.  No focal weakness.  No focal sensory changes.   No difficulty with balance.  + difficulty with ambulation.        MEDICATIONS  (STANDING):  amLODIPine   Tablet 10 milliGRAM(s) Oral daily  atorvastatin 10 milliGRAM(s) Oral at bedtime  enoxaparin Injectable 30 milliGRAM(s) SubCutaneous every 24 hours  hydrALAZINE 50 milliGRAM(s) Oral two times a day  insulin glargine Injectable (LANTUS) 20 Unit(s) SubCutaneous at bedtime  insulin lispro (ADMELOG) corrective regimen sliding scale   SubCutaneous three times a day before meals  insulin lispro (ADMELOG) corrective regimen sliding scale   SubCutaneous at bedtime  insulin lispro Injectable (ADMELOG) 7 Unit(s) SubCutaneous three times a day before meals  levETIRAcetam 250 milliGRAM(s) Oral two times a day  methimazole 5 milliGRAM(s) Oral daily  potassium phosphate / sodium phosphate Tablet (K-PHOS No. 2) 1 Tablet(s) Oral four times a day with meals    MEDICATIONS  (PRN):  acetaminophen     Tablet .. 650 milliGRAM(s) Oral every 6 hours PRN Temp greater or equal to 38C (100.4F), Mild Pain (1 - 3)  aluminum hydroxide/magnesium hydroxide/simethicone Suspension 30 milliLiter(s) Oral every 4 hours PRN Dyspepsia  melatonin 3 milliGRAM(s) Oral at bedtime PRN Insomnia  ondansetron Injectable 4 milliGRAM(s) IV Push every 8 hours PRN Nausea and/or Vomiting    Allergies    vancomycin (Hives)  iodine (Swelling)  IV Contrast (Short breath)  codeine (Rash)  penicillin (Swelling)  aspirin (Stomach Upset)    Intolerances      PAST MEDICAL & SURGICAL HISTORY:  Diabetes mellitus  Type 2 IDDM      Arthritis      Spinal stenosis, lumbar      Dyslipidemia      Anxiety      HTN (hypertension)      Obesity      Spinal stenosis      Cataract      DUB (dysfunctional uterine bleeding)  hysterectomy age 30's      Anemia      Cervical spinal stenosis      History of hysterectomy  age: 30's      History of appendectomy  1960's      History of cholecystectomy  1960's      H/O mastoidectomy  Right ear      Hx of cataract surgery  bilateral 2008      History of total knee replacement  right 2006  left 2011      History of colon polyps removal  colonoscopy 2010      L3- L5 laminectomy with fusion  2/2013        FAMILY HISTORY: [place] daughter    SOCIAL HISTORY: non smoker    Review of Systems:  Constitutional: No fevers or chills.                    Eyes, Ears, Mouth, Throat: No vision loss   Respiratory: No  cough.                                Cardiovascular: No chest pain   Gastrointestinal: No vomiting.                                         Genitourinary: No urinary incontinence   Musculoskeletal: No joint pain.                                                           Dermatologic: No rash.  Neurological: as per HPI                                                                      Psychiatric: No behavioral problems.  Endocrine: No known hypoglycemia.               Hematologic/Lymphatic: No easy bleeding.    O:  Vital Signs Last 24 Hrs  T(C): 36.6 (03 Mar 2025 05:13), Max: 36.6 (03 Mar 2025 05:13)  T(F): 97.8 (03 Mar 2025 05:13), Max: 97.8 (03 Mar 2025 05:13)  HR: 62 (03 Mar 2025 05:13) (62 - 75)  BP: 172/79 (03 Mar 2025 05:13) (150/65 - 172/79)  BP(mean): 110 (03 Mar 2025 05:13) (97 - 110)  RR: 18 (03 Mar 2025 05:13) (18 - 18)  SpO2: 97% (03 Mar 2025 05:13) (97% - 97%)    Parameters below as of 03 Mar 2025 05:13  Patient On (Oxygen Delivery Method): room air        General Exam:   General appearance: No acute distress                 Cardiovascular: Pedal dorsalis pulses intact bilaterally    Mental Status: Orientated to self, and place but not to date.  Attention grossly intact.  No dysarthria, aphasia or neglect.  Knowledge intact.  Registration intact.  Short and long term memory grossly intact.      Cranial Nerves: CN I - not tested.  PERRL, EOMI, VFF, no nystagmus or diplopia.  No APD.  Fundi not visualized.  CN V1-3 intact to light touch.  No facial asymmetry.  Hearing intact to finger rub bilaterally.  Tongue, uvula and palate midline.  Sternocleidomastoid and Trapezius intact bilaterally.    Motor:   Tone: normal.                  Strength intact throughout  No pronator drift bilaterally                      No dysmetria on finger-nose-finger or heel-shin-heel  No truncal ataxia.  No resting, postural or action tremor.  No myoclonus.    Sensation: intact to light touch    Deep Tendon Reflexes: 1+ bilateral biceps, triceps, brachioradialis, knee and ankle  Toes flexor bilaterally    Gait: able to take few steps with walker    Other:     LABS:                        9.8    14.80 )-----------( 294      ( 03 Mar 2025 05:28 )             31.2     03-03    138  |  109[H]  |  25[H]  ----------------------------<  266[H]  3.8   |  24  |  1.00    Ca    9.1      03 Mar 2025 05:28  Phos  2.4     03-03  Mg     1.6     03-03    TPro  7.3  /  Alb  3.1[L]  /  TBili  0.5  /  DBili  x   /  AST  39  /  ALT  19  /  AlkPhos  76  03-01    PT/INR - ( 01 Mar 2025 22:13 )   PT: 11.5 sec;   INR: 0.99 ratio         PTT - ( 01 Mar 2025 22:13 )  PTT:30.5 sec  Urinalysis Basic - ( 03 Mar 2025 05:28 )    Color: x / Appearance: x / SG: x / pH: x  Gluc: 266 mg/dL / Ketone: x  / Bili: x / Urobili: x   Blood: x / Protein: x / Nitrite: x   Leuk Esterase: x / RBC: x / WBC x   Sq Epi: x / Non Sq Epi: x / Bacteria: x          RADIOLOGY & ADDITIONAL STUDIES:    < from: CT Head No Cont (03.02.25 @ 10:29) >  Interval enlargementof hyperdense partially calcified extra-axial   parafalcine mass, currently 4.5 x 3.3 x 4.3 cm, previously 3.2 x 2.2 x   3.0 cm (maximal AP x TV x CC dimensions).    New extensive left frontal vasogenic edema.    New mass effect upon the left frontalhorn. New rightward midline shift   of 2 to 3 mm at the level of the foramen of Monro.    Basal cisterns visualized. No hydrocephalus.    Dr. Loco discussed these findings with nurse practitioner Benitez on   3/2/2025 11:34 AM with read back.    < end of copied text >    < from: MR Head w/wo IV Cont (10.07.18 @ 15:25) >  3.3 cm avidly enhancing left parasagittal frontal extra-axial mass with   adjacent vasogenic edema and local mass effect. Findings most consistent   with a meningioma.    T1 isointense, T2 and FLAIR hyperintense, enhancing lesion in the high   left anterior parietal bone which corresponds to a lucent lesion seen on   the prior CT, likely representing an hemangioma.    No abnormal parenchymal or leptomeningeal enhancement.    < end of copied text >

## 2025-03-03 NOTE — PHYSICAL THERAPY INITIAL EVALUATION ADULT - WEIGHT-BEARING RESTRICTIONS: GAIT, REHAB EVAL
50% L LE. Pt is non compliant with PWB L LE instructions, however does not complain of increased pain when ambulating with WBAT./partial weight-bearing

## 2025-03-03 NOTE — PHYSICAL THERAPY INITIAL EVALUATION ADULT - WEIGHT-BEARING RESTRICTIONS: STAND/SIT, REHAB EVAL
[Procedure: _________] : a [unfilled] procedure visit 50% L LE. Pt is non compliant with PWB L LE instructions, however does not complain of increased pain when performing transfers with WBAT./partial weight-bearing

## 2025-03-04 LAB
ANION GAP SERPL CALC-SCNC: 6 MMOL/L — SIGNIFICANT CHANGE UP (ref 5–17)
BUN SERPL-MCNC: 17 MG/DL — SIGNIFICANT CHANGE UP (ref 7–18)
CALCIUM SERPL-MCNC: 8.8 MG/DL — SIGNIFICANT CHANGE UP (ref 8.4–10.5)
CHLORIDE SERPL-SCNC: 109 MMOL/L — HIGH (ref 96–108)
CO2 SERPL-SCNC: 26 MMOL/L — SIGNIFICANT CHANGE UP (ref 22–31)
CREAT SERPL-MCNC: 0.81 MG/DL — SIGNIFICANT CHANGE UP (ref 0.5–1.3)
CULTURE RESULTS: SIGNIFICANT CHANGE UP
EGFR: 67 ML/MIN/1.73M2 — SIGNIFICANT CHANGE UP
EGFR: 67 ML/MIN/1.73M2 — SIGNIFICANT CHANGE UP
GLUCOSE BLDC GLUCOMTR-MCNC: 105 MG/DL — HIGH (ref 70–99)
GLUCOSE BLDC GLUCOMTR-MCNC: 174 MG/DL — HIGH (ref 70–99)
GLUCOSE BLDC GLUCOMTR-MCNC: 179 MG/DL — HIGH (ref 70–99)
GLUCOSE BLDC GLUCOMTR-MCNC: 202 MG/DL — HIGH (ref 70–99)
GLUCOSE BLDC GLUCOMTR-MCNC: 39 MG/DL — CRITICAL LOW (ref 70–99)
GLUCOSE SERPL-MCNC: 196 MG/DL — HIGH (ref 70–99)
HCT VFR BLD CALC: 29.4 % — LOW (ref 34.5–45)
HGB BLD-MCNC: 9.5 G/DL — LOW (ref 11.5–15.5)
MCHC RBC-ENTMCNC: 26.2 PG — LOW (ref 27–34)
MCHC RBC-ENTMCNC: 32.3 G/DL — SIGNIFICANT CHANGE UP (ref 32–36)
MCV RBC AUTO: 81.2 FL — SIGNIFICANT CHANGE UP (ref 80–100)
NRBC BLD AUTO-RTO: 0 /100 WBCS — SIGNIFICANT CHANGE UP (ref 0–0)
PLATELET # BLD AUTO: 320 K/UL — SIGNIFICANT CHANGE UP (ref 150–400)
POTASSIUM SERPL-MCNC: 3.3 MMOL/L — LOW (ref 3.5–5.3)
POTASSIUM SERPL-SCNC: 3.3 MMOL/L — LOW (ref 3.5–5.3)
RBC # BLD: 3.62 M/UL — LOW (ref 3.8–5.2)
RBC # FLD: 15.4 % — HIGH (ref 10.3–14.5)
SODIUM SERPL-SCNC: 141 MMOL/L — SIGNIFICANT CHANGE UP (ref 135–145)
SPECIMEN SOURCE: SIGNIFICANT CHANGE UP
WBC # BLD: 7.54 K/UL — SIGNIFICANT CHANGE UP (ref 3.8–10.5)
WBC # FLD AUTO: 7.54 K/UL — SIGNIFICANT CHANGE UP (ref 3.8–10.5)

## 2025-03-04 PROCEDURE — 99233 SBSQ HOSP IP/OBS HIGH 50: CPT

## 2025-03-04 PROCEDURE — 99233 SBSQ HOSP IP/OBS HIGH 50: CPT | Mod: 25

## 2025-03-04 RX ORDER — INSULIN LISPRO 100 U/ML
5 INJECTION, SOLUTION INTRAVENOUS; SUBCUTANEOUS
Refills: 0 | Status: DISCONTINUED | OUTPATIENT
Start: 2025-03-04 | End: 2025-03-05

## 2025-03-04 RX ORDER — INSULIN GLARGINE-YFGN 100 [IU]/ML
10 INJECTION, SOLUTION SUBCUTANEOUS AT BEDTIME
Refills: 0 | Status: DISCONTINUED | OUTPATIENT
Start: 2025-03-04 | End: 2025-03-05

## 2025-03-04 RX ORDER — DEXTROSE 50 % IN WATER 50 %
50 SYRINGE (ML) INTRAVENOUS ONCE
Refills: 0 | Status: COMPLETED | OUTPATIENT
Start: 2025-03-04 | End: 2025-03-04

## 2025-03-04 RX ADMIN — Medication 50 MILLIGRAM(S): at 17:33

## 2025-03-04 RX ADMIN — Medication 40 MILLIEQUIVALENT(S): at 11:47

## 2025-03-04 RX ADMIN — INSULIN LISPRO 2: 100 INJECTION, SOLUTION INTRAVENOUS; SUBCUTANEOUS at 08:04

## 2025-03-04 RX ADMIN — LEVETIRACETAM 250 MILLIGRAM(S): 10 INJECTION, SOLUTION INTRAVENOUS at 06:32

## 2025-03-04 RX ADMIN — ENOXAPARIN SODIUM 30 MILLIGRAM(S): 100 INJECTION SUBCUTANEOUS at 08:04

## 2025-03-04 RX ADMIN — INSULIN LISPRO 7 UNIT(S): 100 INJECTION, SOLUTION INTRAVENOUS; SUBCUTANEOUS at 08:05

## 2025-03-04 RX ADMIN — INSULIN LISPRO 7 UNIT(S): 100 INJECTION, SOLUTION INTRAVENOUS; SUBCUTANEOUS at 11:48

## 2025-03-04 RX ADMIN — Medication 50 MILLILITER(S): at 22:08

## 2025-03-04 RX ADMIN — ATORVASTATIN CALCIUM 10 MILLIGRAM(S): 80 TABLET, FILM COATED ORAL at 22:08

## 2025-03-04 RX ADMIN — INSULIN LISPRO 5 UNIT(S): 100 INJECTION, SOLUTION INTRAVENOUS; SUBCUTANEOUS at 17:33

## 2025-03-04 RX ADMIN — Medication 50 MILLIGRAM(S): at 06:32

## 2025-03-04 RX ADMIN — INSULIN LISPRO 1: 100 INJECTION, SOLUTION INTRAVENOUS; SUBCUTANEOUS at 11:48

## 2025-03-04 RX ADMIN — LEVETIRACETAM 250 MILLIGRAM(S): 10 INJECTION, SOLUTION INTRAVENOUS at 17:34

## 2025-03-04 RX ADMIN — AMLODIPINE BESYLATE 10 MILLIGRAM(S): 10 TABLET ORAL at 06:32

## 2025-03-04 NOTE — DISCHARGE NOTE PROVIDER - DETAILS OF MALNUTRITION DIAGNOSIS/DIAGNOSES
This patient has been assessed with a concern for Malnutrition and was treated during this hospitalization for the following Nutrition diagnosis/diagnoses:     -  03/05/2025: Moderate protein-calorie malnutrition

## 2025-03-04 NOTE — SWALLOW BEDSIDE ASSESSMENT ADULT - ORAL PREPARATORY PHASE
2/2 edentulous, commensurate w/ age/Decreased mastication ability Within functional limits 2/2 edentulous/Decreased mastication ability

## 2025-03-04 NOTE — DISCHARGE NOTE PROVIDER - ATTENDING DISCHARGE PHYSICAL EXAMINATION:
Constitutional/General: Elderly, NAD, vitals reviewed  EYE: Symmetrical pupils, conjunctiva clear   ENT: Good dentition, oropharynx clear  NECK: No visual masses, no JVD  CHEST: No respiratory distress, bilateral symmetrical chest rise  ABDOMEN: Nondistended, no visual masses  SKIN: No rash, warm, dry  NEURO: A+Ox3, Cranial nerves grossly intact, moves all extremities, follows commands  PSYCH: Pleasant     Patient seen at bedside with daughter, Alfonso, present. Explained that per discussion with endocrinologist, Dr. Villarreal, patient will be started on 5 units of Lantus, and only 2 units premeal insulin with meals if blood glucose is greater than 200. Daughter in agreement. Will follow up with Dr. Camarena outpatient within one week of discharge.

## 2025-03-04 NOTE — CHART NOTE - NSCHARTNOTEFT_GEN_A_CORE
EVENT: Notified by nurse regarding patient w/ finger stick of 49mg/dl.     HPI: 89 yo F w/ hx of DM2, HTN, OA, spinal stenosis, CKD III, chronic anemia, hyperthyroidism, seizures on Keppra presents with ambulatory dysfunction and SOB x1 day. DDimer elevated, Xray foot negative. Patient admitted for ambulatory dysfunction and r/o DVT/PE. 	    Patient w/ finger stick of 49mg/dl. Mentating at baseline. All other VSS. As per daughter at bedside reported that patient ate most of her dinner.       T(C): 36.3 (03-03-25 @ 20:52), Max: 36.6 (03-03-25 @ 05:13)  HR: 71 (03-03-25 @ 20:52) (60 - 71)  BP: 118/54 (03-03-25 @ 20:52) (118/54 - 172/79)  RR: 17 (03-03-25 @ 20:52) (17 - 18)  SpO2: 96% (03-03-25 @ 20:52) (96% - 99%)    MEDS:   insulin glargine Injectable (LANTUS) 20 Unit(s) SubCutaneous at bedtime  insulin lispro (ADMELOG) corrective regimen sliding scale   SubCutaneous three times a day before meals  insulin lispro (ADMELOG) corrective regimen sliding scale   SubCutaneous at bedtime  insulin lispro Injectable (ADMELOG) 7 Unit(s) SubCutaneous three times a day before meals      PROBLEM: Hypoglycemia - Nurse and family gave patient orange juice fingerstick >82 >128mg/dl  PLAN:   Will encourage po intake.  HOLD lantus for tonight  Will continue to monitor FS q6.  Will continue to monitor closely.
EVENT: POCT Blood Glucose.: 39 mg/dL (03-04-25 @ 21:48)    BRIEF HPI: 89yF with PMH of type 2 DM, OA, spinal stenosis, CKD III, chronic anemia, hyperthyroidism, seizures on Keppra presents with ambulatory dysfunction and SOB x1 day. Admitted for ambulatory dysfunction and r/o PE/DVT. Hospital course complicated by findings of meningioma and midline shift causing compression of the left frontal horn, later found to be previously known. Upon discussion with neurosurgery, neither surgery nor steroids are indicated. Pt DNR/I. Now hypoglycemic.    Vital Signs Last 24 Hrs  T(C): 36.3 (04 Mar 2025 20:35), Max: 36.4 (04 Mar 2025 14:30)  T(F): 97.4 (04 Mar 2025 20:35), Max: 97.6 (04 Mar 2025 14:30)  HR: 88 (04 Mar 2025 20:35) (73 - 88)  BP: 143/60 (04 Mar 2025 20:35) (116/69 - 156/65)  BP(mean): --  RR: 19 (04 Mar 2025 20:35) (17 - 19)  SpO2: 94% (04 Mar 2025 20:35) (94% - 98%)    Parameters below as of 04 Mar 2025 20:35  Patient On (Oxygen Delivery Method): room air    FOCUSSED PE:  GEN: Daughter at bedside, pt in bed eyes closed on initial assessment    LAB  POCT Blood Glucose.: 39 mg/dL (03-04-25 @ 21:48)  POCT Blood Glucose.: 105 mg/dL (03-04-25 @ 17:13)  POCT Blood Glucose.: 179 mg/dL (03-04-25 @ 11:38)  POCT Blood Glucose.: 202 mg/dL (03-04-25 @ 07:45)  POCT Blood Glucose.: 128 mg/dL (03-03-25 @ 22:19)  POCT Blood Glucose.: 82 mg/dL (03-03-25 @ 21:36)  POCT Blood Glucose.: 49 mg/dL (03-03-25 @ 21:14)  POCT Blood Glucose.: 50 mg/dL (03-03-25 @ 21:12)  POCT Blood Glucose.: 135 mg/dL (03-03-25 @ 16:27)  POCT Blood Glucose.: 330 mg/dL (03-03-25 @ 11:32)  POCT Blood Glucose.: 291 mg/dL (03-03-25 @ 07:33)  POCT Blood Glucose.: 318 mg/dL (03-02-25 @ 21:31)  POCT Blood Glucose.: 301 mg/dL (03-02-25 @ 16:23)  POCT Blood Glucose.: 324 mg/dL (03-02-25 @ 11:34)  POCT Blood Glucose.: 373 mg/dL (03-02-25 @ 07:58)    A1C with Estimated Average Glucose Result: 8.0:% (03.03.25 @ 05:28)    PROBLEM: Hypoglycemia probably due to poor oral intake  PLAN  Dextrose 50% Injectable 50 milliliter(s) IV Push once  Cont Diet, Soft and Bite Sized: Mildly Thick Liquids (MILDTHICKLIQS) (03-04-25 @ 13:30) [Active]    FOLLOW UP: POCT glucose trend
Patient will require a rolling walker at home due to the diagnosis of  ambulatory disfunction secondary to spinal stenosis and brain mass to help complete her MRADLs

## 2025-03-04 NOTE — SWALLOW BEDSIDE ASSESSMENT ADULT - SWALLOW EVAL: PATIENT/FAMILY GOALS STATEMENT
As per Pt's daughter, Pt eats a normal diet at home and would like her mother advanced to Regular w/ Thin Liquids.

## 2025-03-04 NOTE — DISCHARGE NOTE PROVIDER - CARE PROVIDER_API CALL
Jaquelin Trujillo  Neurology  [address] [facility]  [place] [address]  Phone: [phone]  Fax: [phone]  Follow Up Time:     Yoko Camarena  Internal Medicine  [address] [place] [id number]  Phone: [phone]  Fax: [phone]  Follow Up Time:    Jaquelin Trujillo  Neurology  [address] [facility], [address]  [place] [address]  Phone: [phone]  Fax: [phone]  Follow Up Time: 1 week    Yoko Camarena  Internal Medicine  [address] [place] [address]  Phone: [phone]  Fax: [phone]  Follow Up Time: 1 week    Indy Villarreal  Endocrinology/Metab/Diabetes  [address] [place] [address]  Phone: [phone]  Fax: [phone]  Follow Up Time: 1 week

## 2025-03-04 NOTE — PROGRESS NOTE ADULT - CONVERSATION DETAILS
GOC discussed with patient and family at bedside. At this time, patient is opting to remain Full Code, and agrees to CPR/mechanical ventilation if indicated. No feeding tube.   Risk vs benefits of CPR and mechanical ventilation discussed, family stated they will revisit this with patient at a later time with patient's PCP.

## 2025-03-04 NOTE — DISCHARGE NOTE PROVIDER - NSDCMRMEDTOKEN_GEN_ALL_CORE_FT
amLODIPine 10 mg oral tablet: 1 tab(s) orally once a day  atorvastatin 10 mg oral tablet: 1 tab(s) orally once a day (at bedtime)  hydrALAZINE 50 mg oral tablet: 1 tab(s) orally 2 times a day  Januvia 50 mg oral tablet: 1 tab(s) orally once a day  levETIRAcetam 250 mg oral tablet: 1 tab(s) orally 2 times a day  methIMAzole 5 mg oral tablet: 1 tab(s) orally once a day  NovoLOG FlexPen 100 units/mL injectable solution: 6 unit(s) injectable 3 times a day  oxycodone-acetaminophen 5 mg-325 mg oral tablet: 1 tab(s) orally every 6 hours as needed for pain  Tresiba FlexTouch: 20 unit(s) subcutaneous once a day (at bedtime)   amLODIPine 10 mg oral tablet: 1 tab(s) orally once a day  atorvastatin 10 mg oral tablet: 1 tab(s) orally once a day (at bedtime)  Basaglar KwikPen 100 units/mL subcutaneous solution: 5 unit(s) subcutaneous once a day (in the morning)  hydrALAZINE 50 mg oral tablet: 1 tab(s) orally 2 times a day  Januvia 50 mg oral tablet: 1 tab(s) orally once a day  levETIRAcetam 250 mg oral tablet: 1 tab(s) orally 2 times a day  lidocaine 4% topical film: Apply topically to affected area once a day Apply patch to left foot for up to 12 hours then remove for at least 12 hours before applying a new patch  methIMAzole 5 mg oral tablet: 1 tab(s) orally once a day  NovoLOG FlexPen 100 units/mL injectable solution: 2 unit(s) injectable 3 times a day Inject 2 units before meals for blood glucose above 200  oxycodone-acetaminophen 5 mg-325 mg oral tablet: 1 tab(s) orally every 6 hours as needed for pain

## 2025-03-04 NOTE — PROGRESS NOTE ADULT - TIME BILLING
Time spent includes direct patient care (interview and examination of patient), discussion with other providers, support staff and/or patient's family members, review of medical records, ordering diagnostic tests and analyzing results, and documentation.
Time spent includes direct patient care (interview and examination of patient), discussion with other providers, support staff and/or patient's family members, review of medical records, ordering diagnostic tests and analyzing results, and documentation.
-Review of hospital course, labs, vitals, medical records  -Bedside exam and interview  -GOC discussion  -Discussed plan and coordinated care with ACP/housestaff, family   -Documentation of encounter

## 2025-03-04 NOTE — DISCHARGE NOTE PROVIDER - NSDCFUADDAPPT_GEN_ALL_CORE_FT
APPTS ARE READY TO BE MADE: [x] YES    Best Family or Patient Contact (if needed): please call daughter, Alfonso Eid ([phone])    Additional Information about above appointments (if needed):    1: PCP - one week post-hospitalization visit   2:   3:     Other comments or requests:    APPTS ARE READY TO BE MADE: [x] YES    Best Family or Patient Contact (if needed): please call daughter, Alfonso Eid ([phone])    Additional Information about above appointments (if needed):    1: PCP - one week post-hospitalization visit   2:   3:     Other comments or requests:       Patient was outreached but did not answer. A voicemail was left for the patient to return our call.

## 2025-03-04 NOTE — DISCHARGE NOTE PROVIDER - HOSPITAL COURSE
89 yo F w/ hx of DM2, HTN, OA, spinal stenosis, CKD III, chronic anemia, hyperthyroidism, seizures on Keppra presented with difficulties ambulating and intermittent shortness of breath.   Found to have elevated Ddimer elevated, Xray foot negative.  Admitted for ambulatory dysfunction and r/o DVT/PE. CTangio chest  negative for PE.   Course c/b confusion and  delirium found to have enlargement of her known meningioma with new vasogenic edema, compression of the left frontal horn, and slight midline shift on CT head.   Neurology and Neurosurgery consulted (Dr Rayo from [facility]) , as per neurosurgery, given age and co-morbidities, unlikely to be a good surgical candidate.   Palliative on board: pt is full code   Noted with episode of hypoglycemia overnight, endo consulted, insuline adjusted   Evaluated by PT recommended home PT and rolling walker     INCOMPLETE 89 yo F w/ hx of DM2, HTN, OA, spinal stenosis, CKD III, chronic anemia, hyperthyroidism, seizures on Keppra presented with difficulties ambulating and intermittent shortness of breath.   Found to have elevated Ddimer elevated, Xray foot negative.  Admitted for ambulatory dysfunction and r/o DVT/PE. CTangio chest  negative for PE.   Course c/b confusion and  delirium found to have enlargement of her known meningioma with new vasogenic edema, compression of the left frontal horn, and slight midline shift on CT head.   Neurology and Neurosurgery consulted (Dr Rayo from [facility]) , as per neurosurgery, given age and co-morbidities, unlikely to be a good surgical candidate. No steroids recommended. Keppra 250mg BID continued while inpatient   Patient was recommended to follow up with general neurology at [address] [place] [address] 2-4 weeks after discharge for her seizure and keppra management.  the patient/family  to call [phone] to schedule this appointment   Palliative on board: pt is full code   Noted with episode of hypoglycemia overnight, endo consulted, insuline adjusted   Evaluated by PT recommended home PT and rolling walker     INCOMPLETE     - Prior records obtained from [facility] and reviewed.   - Unclear utility of steroids for mass effect and midline shift @ this point given unclear chronicity. Could have a discussion with her outpt Neuro and Hem/Onc to see if steroids would be of any help in symptomatic management.   - NSGY consulted by primary team and NTD,   - Sz, fall and aspiration precautions.   - PT eval and reccs per primary team.  -   - Pt would also benefit from Outpt Oncology Referral for further eval and discuss other options if in line with GOC. 89 yo F w/ hx of DM2, HTN, OA, spinal stenosis, CKD III, chronic anemia, hyperthyroidism, seizures on Keppra presented with difficulties ambulating and intermittent shortness of breath. Found to have elevated D-dimer elevated, Xray foot negative. Admitted for ambulatory dysfunction and r/o DVT/PE. CT Angio chest negative for PE.   Course c/b confusion and delirium found to have enlargement of known meningioma with new vasogenic edema, compression of the left frontal horn, and slight midline shift on CT head.   Prior records obtained from [facility] and reviewed. Unclear utility of steroids for mass effect and midline shift @ this point given unclear chronicity. Could have a discussion with her outpt Neuro and Hem/Onc to see if steroids would be of any help in symptomatic management.     Neurology and Neurosurgery consulted (Dr. Rayo from [facility]) , as per neurosurgery, given age and co-morbidities, unlikely to be a good surgical candidate. No steroids recommended. Keppra 250mg BID continued while inpatient. Patient was recommended to follow up with general neurology at [address] [place] [address] 2-4 weeks after discharge for her seizure and Keppra management.   Pt would also benefit from Outpt Oncology Referral for further eval and discuss other options if in line with GOC.     Palliative consulted pt remained full code.   Noted with episode of hypoglycemia overnight, endocrine consulted, insulin adjusted     Evaluated by PT recommended home PT and rolling walker     Patient now optimized for discharge home today, plan discussed with primary attending.

## 2025-03-04 NOTE — DISCHARGE NOTE PROVIDER - NSDCCAREPROVSEEN_GEN_ALL_CORE_FT
Malick, Radha Trujillo, Jaquelin Villarreal, Indy ARTIS Malick, Radha Trujillo, Jaquelin Villarreal, Indy Westbrook, Casi

## 2025-03-04 NOTE — SWALLOW BEDSIDE ASSESSMENT ADULT - SWALLOW EVAL: DIAGNOSIS
Pt p/w adequate oral & pharygneal phases of chew & swallow for tolerating soft chewable diet: adequate labial seal w/ no anterior loss of bolus, slow but adequate mastication, adequate bolus formation & containment, mildly impaired a+p transport, mildly reduced hyolaryngeal elevation commensurate w/ age, and multiple swallows. Pt able to clear oral cavity w/ all consistencies trialed without liquid wash & with no overt s&s of penetration/aspiration at time of examination.

## 2025-03-04 NOTE — DISCHARGE NOTE PROVIDER - NSDCCPCAREPLAN_GEN_ALL_CORE_FT
PRINCIPAL DISCHARGE DIAGNOSIS  Diagnosis: Meningioma  Assessment and Plan of Treatment:       SECONDARY DISCHARGE DIAGNOSES  Diagnosis: Ambulatory dysfunction  Assessment and Plan of Treatment:      PRINCIPAL DISCHARGE DIAGNOSIS  Diagnosis: Meningioma  Assessment and Plan of Treatment: You were found to have enlargement of  known meningioma with new vasogenic edema, compression of the left frontal horn, and slight midline shift on CT scan of head   You were evaluated by Neurology. No surgical intervention recommended  Please  follow up with general neurology at [address] [place] [address] 2-4 weeks after discharge for her seizure managment call [phone] to schedule this appointment      SECONDARY DISCHARGE DIAGNOSES  Diagnosis: Ambulatory dysfunction  Assessment and Plan of Treatment: You were evaluted by Physiscal therapy and recommended home Physical Therapy and rolling walker while ambulating     PRINCIPAL DISCHARGE DIAGNOSIS  Diagnosis: Meningioma  Assessment and Plan of Treatment: You were found to have enlargement of  known meningioma with new vasogenic edema, compression of the left frontal horn, and slight midline shift on CT scan of head.   You were evaluated by Neurology and neurosurgery. No surgical intervention recommended  Please  follow up with general neurology at [address] [place] [address] 2-4 weeks after discharge for her seizure managment call [phone] to schedule this appointment      SECONDARY DISCHARGE DIAGNOSES  Diagnosis: Ambulatory dysfunction  Assessment and Plan of Treatment: You were evaluted by Physiscal therapy and recommended home Physical Therapy and rolling walker while ambulating    Diagnosis: Diabetes mellitus  Assessment and Plan of Treatment: You have a history of Diabetes, you were having low blood sugars. You were seen by endocrinologist and your insulin was adjusted.   -Please continue Lantus 5units in the morning   -Novolog 2 units before each meal if blood glucose is above 200.   -Follow up with Endocrinologist for ongoing managment    Diagnosis: HTN (hypertension)  Assessment and Plan of Treatment: You have a history of high blood pressure, continue medications as prescribed. Follow up with primary care provider for ongoing management. Follow a low salt diet     PRINCIPAL DISCHARGE DIAGNOSIS  Diagnosis: Meningioma  Assessment and Plan of Treatment: You were found to have enlargement of  known meningioma with new vasogenic edema, compression of the left frontal horn, and slight midline shift on CT scan of head.   You were evaluated by Neurology and neurosurgery. No surgical intervention recommended  Please  follow up with general neurology at [address] [place] [address] 2-4 weeks after discharge for her seizure managment call [phone] to schedule this appointment      SECONDARY DISCHARGE DIAGNOSES  Diagnosis: Ambulatory dysfunction  Assessment and Plan of Treatment: You were evaluted by Physiscal therapy and recommended home Physical Therapy and rolling walker while ambulating    Diagnosis: Diabetes mellitus  Assessment and Plan of Treatment: You have a history of Diabetes, you were having low blood sugars. You were seen by endocrinologist and your insulin was adjusted. Your insuline regimen was adjusted due to episodes of hypoglycemia with your blood glucose going as low as the 30s during this admission.   -Please continue Lantus 5units in the morning   -Novolog 2 units before each meal if blood glucose is above 200   -Follow up with Endocrinologist for ongoing managment    Diagnosis: HTN (hypertension)  Assessment and Plan of Treatment: You have a history of high blood pressure, continue medications as prescribed. Follow up with primary care provider for ongoing management. Follow a low salt diet

## 2025-03-04 NOTE — SWALLOW BEDSIDE ASSESSMENT ADULT - SLP PERTINENT HISTORY OF CURRENT PROBLEM
89 F, PMHx DM, HTN, OA, spin stenosis, CKD III, chronic anemia, hyperthyroidism, seizures, & suspect CVT + PE. Pt has had recent hospitalizations for falls [place].

## 2025-03-04 NOTE — PROGRESS NOTE ADULT - SUBJECTIVE AND OBJECTIVE BOX
NP Note discussed with  primary attending    Patient is a 89y old  Female who presents with a chief complaint of Ambulatory Dysfunction, r/o PE (03 Mar 2025 16:09)      INTERVAL HPI/OVERNIGHT EVENTS: no new complaints    MEDICATIONS  (STANDING):  amLODIPine   Tablet 10 milliGRAM(s) Oral daily  atorvastatin 10 milliGRAM(s) Oral at bedtime  enoxaparin Injectable 30 milliGRAM(s) SubCutaneous every 24 hours  hydrALAZINE 50 milliGRAM(s) Oral two times a day  insulin glargine Injectable (LANTUS) 20 Unit(s) SubCutaneous at bedtime  insulin lispro (ADMELOG) corrective regimen sliding scale   SubCutaneous three times a day before meals  insulin lispro (ADMELOG) corrective regimen sliding scale   SubCutaneous at bedtime  insulin lispro Injectable (ADMELOG) 7 Unit(s) SubCutaneous three times a day before meals  levETIRAcetam 250 milliGRAM(s) Oral two times a day  methimazole 5 milliGRAM(s) Oral daily    MEDICATIONS  (PRN):  acetaminophen     Tablet .. 650 milliGRAM(s) Oral every 6 hours PRN Temp greater or equal to 38C (100.4F), Mild Pain (1 - 3)  aluminum hydroxide/magnesium hydroxide/simethicone Suspension 30 milliLiter(s) Oral every 4 hours PRN Dyspepsia  melatonin 3 milliGRAM(s) Oral at bedtime PRN Insomnia  ondansetron Injectable 4 milliGRAM(s) IV Push every 8 hours PRN Nausea and/or Vomiting      __________________________________________________  REVIEW OF SYSTEMS:    CONSTITUTIONAL: No fever,   RESPIRATORY: No cough; No shortness of breath  CARDIOVASCULAR: No chest pain, no palpitations  GASTROINTESTINAL: No pain. No nausea or vomiting; No diarrhea   NEUROLOGICAL: No headache or numbness, no tremors  MUSCULOSKELETAL: No joint pain, no muscle pain  GENITOURINARY: no dysuria, no frequency, no hesitancy, + intermittent incontinence          Vital Signs Last 24 Hrs  T(C): 36.3 (04 Mar 2025 05:36), Max: 36.3 (03 Mar 2025 13:45)  T(F): 97.3 (04 Mar 2025 05:36), Max: 97.3 (03 Mar 2025 13:45)  HR: 76 (04 Mar 2025 11:30) (60 - 76)  BP: 133/76 (04 Mar 2025 11:30) (118/54 - 156/81)  BP(mean): 74 (03 Mar 2025 20:52) (74 - 89)  RR: 17 (04 Mar 2025 05:36) (17 - 18)  SpO2: 97% (04 Mar 2025 11:30) (96% - 97%)    Parameters below as of 04 Mar 2025 11:30  Patient On (Oxygen Delivery Method): room air        ________________________________________________  PHYSICAL EXAM:  GENERAL: NAD, obese   CHEST/LUNG: Clear to auscultation bilaterally  HEART: S1 S2  regular;   ABDOMEN: Soft, Nontender, Nondistended; Bowel sounds present  EXTREMITIES: no cyanosis; no edema; no calf tenderness  SKIN: warm and dry; no rash  NERVOUS SYSTEM:  Awake and alert; Oriented  to place, person and disorieted to  time ; no new deficits    _________________________________________________  LABS:                        9.5    7.54  )-----------( 320      ( 04 Mar 2025 05:46 )             29.4     03-04    141  |  109[H]  |  17  ----------------------------<  196[H]  3.3[L]   |  26  |  0.81    Ca    8.8      04 Mar 2025 05:46  Phos  2.4     03-03  Mg     1.6     03-03        Urinalysis Basic - ( 04 Mar 2025 05:46 )    Color: x / Appearance: x / SG: x / pH: x  Gluc: 196 mg/dL / Ketone: x  / Bili: x / Urobili: x   Blood: x / Protein: x / Nitrite: x   Leuk Esterase: x / RBC: x / WBC x   Sq Epi: x / Non Sq Epi: x / Bacteria: x      CAPILLARY BLOOD GLUCOSE      POCT Blood Glucose.: 179 mg/dL (04 Mar 2025 11:38)  POCT Blood Glucose.: 202 mg/dL (04 Mar 2025 07:45)  POCT Blood Glucose.: 128 mg/dL (03 Mar 2025 22:19)  POCT Blood Glucose.: 82 mg/dL (03 Mar 2025 21:36)  POCT Blood Glucose.: 49 mg/dL (03 Mar 2025 21:14)  POCT Blood Glucose.: 50 mg/dL (03 Mar 2025 21:12)  POCT Blood Glucose.: 135 mg/dL (03 Mar 2025 16:27)        RADIOLOGY & ADDITIONAL TESTS:    Imaging Personally Reviewed:  YES/NO    Consultant(s) Notes Reviewed:   YES/ No    Care Discussed with Consultants :     Plan of care was discussed with patient and /or primary care giver; all questions and concerns were addressed and care was aligned with patient's wishes.

## 2025-03-04 NOTE — SWALLOW BEDSIDE ASSESSMENT ADULT - COMMENTS
AA+Ox2 (Name, Place, Family). Received sitting at bedside with family. Eek (Left ear preferred). Verbal w/ low & weak vocal quality.

## 2025-03-04 NOTE — SWALLOW BEDSIDE ASSESSMENT ADULT - MODE OF PRESENTATION
x3/self fed x3/spoon/self fed cup x2 spoon, 4oz cup sip, x1 rapid straw sip/cup/spoon/straw/self fed/fed by clinician x2 cookies/self fed

## 2025-03-04 NOTE — DISCHARGE NOTE PROVIDER - CARE PROVIDERS DIRECT ADDRESSES
,[email or web address],DirectAddress_Unknown ,[email or web address],DirectAddress_Unknown,DirectAddress_Unknown

## 2025-03-04 NOTE — PROGRESS NOTE ADULT - SUBJECTIVE AND OBJECTIVE BOX
NEUROLOGY FOLLOW-UP CONSULT NOTE    RFC: AMS, CTH w/ expanding Meningioma.     Interval history: O/N pt with hypoglycemia w/BG @ 50's, Pt @ baseline mental status.     Review of prior records obtained from [facility] [place]  No CDs /images of the CT / MRI available. Reviewed the faxed records.   Pt was seen @ [facility] in December 2024 for eval of fall Vs Sz. Unclear if pt had a fall while seated on a toilet seat unwitnessed and had Sz 2/2 trauma Vs had a Sz and fell. Pt had CTH done @ that time which showed     CTH WO ( done @ [facility] on 12/28/24)   Final result: Ill- defined bifrontal parafalcine mass, more pronounced on the left measuring upto 4.1cm in size with adjacent vasogenic edema of the left frontal lobe and punctate areas pf hyperattenuation likely calcifications. 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 Follow with MRI with gadolinium is recommended.   Regional mass effect with partial effacement of the bifrontal horns, greater on the left and approximately 5mm rightward shift.     MRI W/WO done @ OSH in 12/28/24:   4.4cm left parafalcine mass with slight extension along the right falx results in significant left frontal lobe vasogenic edema with 5mm rightward midline shift 180degree encasement of the distal STEPHEN branches.     Ofnote, pts OSH HPI also states that pt presented to OSH in october 2024 and also found to have a similar lesion on her CT imaging at that time with approximately 7mm of midline shift read and surrounding vasogenic edema around the parasaggital mass.   Family did not want any surgical intervention @ OSH in December given her age and co- morbidities and was D/Cd with outpt F/U w/cancer centre @ [facility], started on keppra for first time Sz in life. Unclear if pt had a follow up as outpt.     Meds:  MEDICATIONS  (STANDING):  amLODIPine   Tablet 10 milliGRAM(s) Oral daily  atorvastatin 10 milliGRAM(s) Oral at bedtime  enoxaparin Injectable 30 milliGRAM(s) SubCutaneous every 24 hours  hydrALAZINE 50 milliGRAM(s) Oral two times a day  insulin glargine Injectable (LANTUS) 10 Unit(s) SubCutaneous at bedtime  insulin lispro (ADMELOG) corrective regimen sliding scale   SubCutaneous three times a day before meals  insulin lispro (ADMELOG) corrective regimen sliding scale   SubCutaneous at bedtime  insulin lispro Injectable (ADMELOG) 5 Unit(s) SubCutaneous three times a day before meals  levETIRAcetam 250 milliGRAM(s) Oral two times a day  methimazole 5 milliGRAM(s) Oral daily    MEDICATIONS  (PRN):  acetaminophen     Tablet .. 650 milliGRAM(s) Oral every 6 hours PRN Temp greater or equal to 38C (100.4F), Mild Pain (1 - 3)  aluminum hydroxide/magnesium hydroxide/simethicone Suspension 30 milliLiter(s) Oral every 4 hours PRN Dyspepsia  melatonin 3 milliGRAM(s) Oral at bedtime PRN Insomnia  ondansetron Injectable 4 milliGRAM(s) IV Push every 8 hours PRN Nausea and/or Vomiting      PMHx/PSHx/FHx/SHx:  Pain of joint of left ankle and foot  Diabetes mellitus  Arthritis  Spinal stenosis, lumbar  Dyslipidemia  Anxiety  HTN (hypertension)  Obesity  Cataract  DUB (dysfunctional uterine bleeding)  Anemia  Cervical spinal stenosis  Mass, brain  Ambulatory dysfunction  D-dimer, elevated  Hyperthyroidism  Stage 3 chronic kidney disease  Suspected deep vein thrombosis (DVT)  Suspected pulmonary embolism  Vasogenic brain edema  Midline shift of brain with brain compression  Meningioma  Mass, brain  Delirium  Palliative care encounter  History of hysterectomy  History of appendectomy  History of cholecystectomy  H/O mastoidectomy  Hx of cataract surgery  History of total knee replacement  History of colon polyps removal  L3- L5 laminectomy with fusion  Cervical spinal stenosis        Allergies:  vancomycin (Hives)  iodine (Swelling)  IV Contrast (Short breath)  codeine (Rash)  penicillin (Swelling)  aspirin (Stomach Upset)      ROS: All systems negative except as documented in Interval history    O:  T(C): 36.4 (03-04-25 @ 14:30), Max: 36.4 (03-04-25 @ 14:30)  HR: 76 (03-04-25 @ 14:30) (60 - 76)  BP: 116/69 (03-04-25 @ 14:30) (116/69 - 156/81)  RR: 17 (03-04-25 @ 14:30) (17 - 18)  SpO2: 98% (03-04-25 @ 14:30) (96% - 98%)    Focused neurologic exam:  MS - AAO x1-2, oriented to self and place, not to time. No dysarthria/aphasia. Attention intact.   CN - PERRLA, EOMI, VFF by BTT,  face appears grossly symmetric, tongue midline.   Motor - Normal bulk/tone, moves all extremities, no gross asymmetry or drift.   Sens - LT/temp/vib intact all  Coord - FtN intact b/l  Gait and station - Deferred. (Yesterday was able to take few steps with walker).           Pertinent labs/studies:                          9.5    7.54  )-----------( 320      ( 04 Mar 2025 05:46 )             29.4     03-04    141  |  109[H]  |  17  ----------------------------<  196[H]  3.3[L]   |  26  |  0.81    Ca    8.8      04 Mar 2025 05:46  Phos  2.4     03-03  Mg     1.6     03-03      RADIOLOGY AND OTHER ADDITIONAL STUDIES :     CT Head No Cont (03.02.25 @ 10:29)   IMPRESSION:    Interval enlargementof hyperdense partially calcified extra-axial   parafalcine mass, currently 4.5 x 3.3 x 4.3 cm, previously 3.2 x 2.2 x   3.0 cm (maximal AP x TV x CC dimensions).    New extensive left frontal vasogenic edema.    New mass effect upon the left frontal horn. New rightward midline shift   of 2 to 3 mm at the level of the foramen of Monro.    Basal cisterns visualized. No hydrocephalus.    PRIOR IMAGING:     CT Head No Cont (10.06.18 @ 22:01)     IMPRESSION:    CT BRAIN: No acute intracranial hemorrhage. Abnormal lesion along the   medial left frontal lobe with mild adjacent vasogenic edema and mild mass   effect upon the frontal horn of the left lateral ventricle. It is not   clear whether this is intra or extra-axial. There is no midline shift or   herniation. MRI of the brain with and without contrast recommended for   further evaluation if there are no contraindications.    MR Head w/wo IV Cont (10.07.18 @ 15:25)   IMPRESSION:    3.3 cm avidly enhancing left parasagittal frontal extra-axial mass with   adjacent vasogenic edema and local mass effect. Findings most consistent   with a meningioma.    T1 isointense, T2 and FLAIR hyperintense, enhancing lesion in the high   left anterior parietal bone which corresponds to a lucent lesion seen on   the prior CT, likely representing an hemangioma.    No abnormal parenchymal or leptomeningeal enhancement.

## 2025-03-05 ENCOUNTER — APPOINTMENT (OUTPATIENT)
Dept: NEUROLOGY | Facility: CLINIC | Age: 89
End: 2025-03-05

## 2025-03-05 LAB
GLUCOSE BLDC GLUCOMTR-MCNC: 215 MG/DL — HIGH (ref 70–99)
GLUCOSE BLDC GLUCOMTR-MCNC: 219 MG/DL — HIGH (ref 70–99)
GLUCOSE BLDC GLUCOMTR-MCNC: 335 MG/DL — HIGH (ref 70–99)
GLUCOSE BLDC GLUCOMTR-MCNC: 56 MG/DL — LOW (ref 70–99)
GLUCOSE BLDC GLUCOMTR-MCNC: 57 MG/DL — LOW (ref 70–99)
GLUCOSE BLDC GLUCOMTR-MCNC: 97 MG/DL — SIGNIFICANT CHANGE UP (ref 70–99)

## 2025-03-05 PROCEDURE — 99232 SBSQ HOSP IP/OBS MODERATE 35: CPT

## 2025-03-05 RX ADMIN — INSULIN LISPRO 5 UNIT(S): 100 INJECTION, SOLUTION INTRAVENOUS; SUBCUTANEOUS at 12:07

## 2025-03-05 RX ADMIN — ENOXAPARIN SODIUM 30 MILLIGRAM(S): 100 INJECTION SUBCUTANEOUS at 11:03

## 2025-03-05 RX ADMIN — INSULIN LISPRO 2: 100 INJECTION, SOLUTION INTRAVENOUS; SUBCUTANEOUS at 12:06

## 2025-03-05 RX ADMIN — ATORVASTATIN CALCIUM 10 MILLIGRAM(S): 80 TABLET, FILM COATED ORAL at 22:06

## 2025-03-05 RX ADMIN — LEVETIRACETAM 250 MILLIGRAM(S): 10 INJECTION, SOLUTION INTRAVENOUS at 06:55

## 2025-03-05 RX ADMIN — INSULIN LISPRO 5 UNIT(S): 100 INJECTION, SOLUTION INTRAVENOUS; SUBCUTANEOUS at 08:21

## 2025-03-05 RX ADMIN — Medication 50 MILLIGRAM(S): at 17:55

## 2025-03-05 RX ADMIN — Medication 50 MILLIGRAM(S): at 06:56

## 2025-03-05 RX ADMIN — LEVETIRACETAM 250 MILLIGRAM(S): 10 INJECTION, SOLUTION INTRAVENOUS at 17:56

## 2025-03-05 RX ADMIN — AMLODIPINE BESYLATE 10 MILLIGRAM(S): 10 TABLET ORAL at 06:55

## 2025-03-05 RX ADMIN — INSULIN LISPRO 4: 100 INJECTION, SOLUTION INTRAVENOUS; SUBCUTANEOUS at 08:21

## 2025-03-05 NOTE — DISCHARGE NOTE NURSING/CASE MANAGEMENT/SOCIAL WORK - NSDCFUADDAPPT_GEN_ALL_CORE_FT
APPTS ARE READY TO BE MADE: [x] YES    Best Family or Patient Contact (if needed): please call daughter, Alfonso Eid ([phone])    Additional Information about above appointments (if needed):    1: PCP - one week post-hospitalization visit   2:   3:     Other comments or requests:

## 2025-03-05 NOTE — PROGRESS NOTE ADULT - PROBLEM SELECTOR PLAN 10
hx of CKD stage 3  Cr 1.21  unknown baseline  avoid nephrotoxic agents, NSAIDs  monitor BMP
H/o spinal stenosis s/p L3-L5 laminectomy  chronic back pain on home percocet 5-325

## 2025-03-05 NOTE — DIETITIAN NUTRITION RISK NOTIFICATION - ADDITIONAL COMMENTS/DIETITIAN RECOMMENDATIONS
Recommend oral supplement Glucerna Therapeutic Shake BID (220 kcal, 10 g pro each) as medically feasible.

## 2025-03-05 NOTE — CONSULT NOTE ADULT - CONSULT REASON
Brain mass with AMS
uncont dm/hypoglycemia
brain mass
Consult to: Discuss complex medical decision making related to goals of care

## 2025-03-05 NOTE — PROGRESS NOTE ADULT - PROBLEM SELECTOR PLAN 1
CT head performed given confusion and delirium in the setting of a known menigioma. Found to have enlargement of her extra-axial parafalcine mass with associated extensive left frontal vasogenic edema, new mass effect of the left frontal horn with new rightward midline shift  -Neurologically appears in tact with the exception of her confusion and that she is AOx2  -Case discussed with neurosurgery at [facility] (Dr. Rayo). Given age and co-morbidities, unlikely to be a good surgical candidate and performing surgery may be worse than not doing it given its extensive nature  -Family (Alfonso and Lorena) aware of findings, discussing amongst themselves what they would like the next steps to be  -Neurosurgery to reach out to family directly   -ICU consulted, not an ICU candidate given that this mass appears to be slow-growing  -Will do neurochecks qshift for now  -Will consider palliative evaluation in the AM
H/o Meningioma  -CT Head showing: Interval enlargement of hyperdense partially calcified extra-axial parafalcine mass, currently 4.5 x 3.3 x 4.3 cm, previously 3.2 x 2.2 x 3.0 cm (maximal AP x TV x CC dimensions). New extensive left frontal vasogenic edema. New mass effect upon the left frontal horn. New rightward midline shift of 2 to 3 mm at the level of the foramen of Monro.  -NeuroSx does not rec sx given risk factors  -Neuro Dr. Trujillo consulted: not recommended steroids   - outpatient neuro follow up   -Palliative care following (pt full code for now)
P/w left foot pain limiting ability to ambulate  -ambulates with cane at baseline  -denies trauma, fall, LOC  -Xray left foot negative for acute fracture  -likely chronic arthritic changes  -partial weight bearing, ambulate with assistance  -PT recs Home PT   -podiatry consult  -fall precautions  -C/w Tylenol 650mg q6hr prn and lidocaine patch for pain
P/w left foot pain limiting ability to ambulate  -ambulates with cane at baseline  -Xray left foot negative for acute fracture  -likely chronic arthritic changes  -partial weight bearing, ambulate with assistance  -PT recs Home PT   -fall precautions  -C/w Tylenol 650mg q6hr prn and lidocaine patch for pain

## 2025-03-05 NOTE — PROGRESS NOTE ADULT - PROBLEM SELECTOR PLAN 8
H/o CKD stage 3  -Cr 1.21 (unknown baseline)  -avoid nephrotoxic agents, NSAIDs  -monitor BMP
hx of HTN on Amlodipine 10mg qD and Hydralazine 50mg qD  -c/w home meds with parameters
H/o CKD stage 3  -Cr 1.21 (unknown baseline)  -avoid nephrotoxic agents, NSAIDs  -monitor BMP
H/o CKD stage 3  -Cr 1.21 (unknown baseline)  -avoid nephrotoxic agents, NSAIDs  -monitor BMP

## 2025-03-05 NOTE — DISCHARGE NOTE NURSING/CASE MANAGEMENT/SOCIAL WORK - PATIENT PORTAL LINK FT
You can access the FollowMyHealth Patient Portal offered by [facility] by registering at the following website: [email or web address]. By joining [employer]’s FollowMyHealth portal, you will also be able to view your health information using other applications (apps) compatible with our system.

## 2025-03-05 NOTE — PROGRESS NOTE ADULT - SUBJECTIVE AND OBJECTIVE BOX
NP Note discussed with  primary attending    Patient is a 89y old  Female who presents with a chief complaint of Ambulatory Dysfunction, r/o PE (05 Mar 2025 10:20)      INTERVAL HPI/OVERNIGHT EVENTS: no new complaints    MEDICATIONS  (STANDING):  amLODIPine   Tablet 10 milliGRAM(s) Oral daily  atorvastatin 10 milliGRAM(s) Oral at bedtime  enoxaparin Injectable 30 milliGRAM(s) SubCutaneous every 24 hours  hydrALAZINE 50 milliGRAM(s) Oral two times a day  insulin glargine Injectable (LANTUS) 10 Unit(s) SubCutaneous at bedtime  insulin lispro (ADMELOG) corrective regimen sliding scale   SubCutaneous three times a day before meals  insulin lispro (ADMELOG) corrective regimen sliding scale   SubCutaneous at bedtime  insulin lispro Injectable (ADMELOG) 5 Unit(s) SubCutaneous three times a day before meals  levETIRAcetam 250 milliGRAM(s) Oral two times a day  methimazole 5 milliGRAM(s) Oral daily    MEDICATIONS  (PRN):  acetaminophen     Tablet .. 650 milliGRAM(s) Oral every 6 hours PRN Temp greater or equal to 38C (100.4F), Mild Pain (1 - 3)  aluminum hydroxide/magnesium hydroxide/simethicone Suspension 30 milliLiter(s) Oral every 4 hours PRN Dyspepsia  melatonin 3 milliGRAM(s) Oral at bedtime PRN Insomnia  ondansetron Injectable 4 milliGRAM(s) IV Push every 8 hours PRN Nausea and/or Vomiting      __________________________________________________  REVIEW OF SYSTEMS:    CONSTITUTIONAL: No fever,   RESPIRATORY: No cough; No shortness of breath  CARDIOVASCULAR: No chest pain, no palpitations  GASTROINTESTINAL: No pain. No nausea or vomiting; No diarrhea   NEUROLOGICAL: No headache or numbness, no tremors  MUSCULOSKELETAL:intermittent oc to LE   GENITOURINARY: no dysuria,         Vital Signs Last 24 Hrs  T(C): 36.3 (04 Mar 2025 20:35), Max: 36.4 (04 Mar 2025 14:30)  T(F): 97.4 (04 Mar 2025 20:35), Max: 97.6 (04 Mar 2025 14:30)  HR: 88 (04 Mar 2025 20:35) (73 - 88)  BP: 143/60 (04 Mar 2025 20:35) (116/69 - 143/60)  BP(mean): --  RR: 19 (04 Mar 2025 20:35) (17 - 19)  SpO2: 94% (04 Mar 2025 20:35) (94% - 98%)    Parameters below as of 04 Mar 2025 20:35  Patient On (Oxygen Delivery Method): room air        ________________________________________________  PHYSICAL EXAM:  GENERAL: NAD  CHEST/LUNG: Clear to ausculitation bilaterally   HEART: S1 S2  regular   ABDOMEN: Soft, Nontender, Nondistended; Bowel sounds present  EXTREMITIES: no cyanosis; no edema; no calf tenderness  SKIN: warm and dry; no rash  NERVOUS SYSTEM:  Awake and alert; forgetful, Oriented  to place, person and disoriented to time ; no new deficits    _________________________________________________  LABS:                        9.5    7.54  )-----------( 320      ( 04 Mar 2025 05:46 )             29.4     03-04    141  |  109[H]  |  17  ----------------------------<  196[H]  3.3[L]   |  26  |  0.81    Ca    8.8      04 Mar 2025 05:46        Urinalysis Basic - ( 04 Mar 2025 05:46 )    Color: x / Appearance: x / SG: x / pH: x  Gluc: 196 mg/dL / Ketone: x  / Bili: x / Urobili: x   Blood: x / Protein: x / Nitrite: x   Leuk Esterase: x / RBC: x / WBC x   Sq Epi: x / Non Sq Epi: x / Bacteria: x      CAPILLARY BLOOD GLUCOSE      POCT Blood Glucose.: 335 mg/dL (05 Mar 2025 08:08)  POCT Blood Glucose.: 174 mg/dL (04 Mar 2025 22:26)  POCT Blood Glucose.: 39 mg/dL (04 Mar 2025 21:48)  POCT Blood Glucose.: 105 mg/dL (04 Mar 2025 17:13)  POCT Blood Glucose.: 179 mg/dL (04 Mar 2025 11:38)        RADIOLOGY & ADDITIONAL TESTS:    Imaging Personally Reviewed:  YES/NO    Consultant(s) Notes Reviewed:   YES/ No    Care Discussed with Consultants :     Plan of care was discussed with patient and /or primary care giver; all questions and concerns were addressed and care was aligned with patient's wishes.

## 2025-03-05 NOTE — DIETITIAN INITIAL EVALUATION ADULT - PERTINENT LABORATORY DATA
03-04    141  |  109[H]  |  17  ----------------------------<  196[H]  3.3[L]   |  26  |  0.81    Ca    8.8      04 Mar 2025 05:46    POCT Blood Glucose.: 215 mg/dL (03-05-25 @ 11:32)  A1C with Estimated Average Glucose Result: 8.0 % (03-03-25 @ 05:28)

## 2025-03-05 NOTE — PROGRESS NOTE ADULT - ASSESSMENT
Assessment and Plan:     Assessment: 89 yo F w/ hx of DM2, HTN, OA, spinal stenosis, CKD III, chronic anemia, hyperthyroidism, seizures on Keppra presents with ambulatory dysfunction and SOB x1 day on 3/1/25. DDimer elevated, Xray foot negative. Patient admitted for ambulatory dysfunction and r/o DVT/PE. Pt noted to be w/ AMS on 3/2 and a CTH was obtained which showed enlarging meningioma with some mass effect and midline shift and neuro was consulted for further eval and management. NSGY consulted by primary team.       Impression: Confusion and gait problem in a pt with known meningioma( was seen in CTH in 2018) w/interval enlargement(3.2 x 2.2 x 3.0 cm in 2018 -->4.5 x 3.3 x 4.3 cm in 2025) with extensive L frontal vasogenic edema and new mass effect upon the Left frontal horn and rightward midline shift 2-3mm @ level of foramen Monro, of unclear etiology.     Recommendations:   - Prior records obtained from [facility] ED and reviewed.   - Unclear utility of steroids for mass effect and midline shift @ this point given unclear chronicity. Could have a discussion with her outpt Neuro and Hem/Onc to see if steroids would be of any help in symptomatic management.   - NSGY consulted by primary team and NTD, No steroids recommended.   - C/W Keppra 250mg BID for now.   - Sz, fall and aspiration precautions.   - PT eval and reccs per primary team.  - Patient can follow up with general neurology at [address] [place] [address] 2-4 weeks after discharge for her Sz and keppra management. Please instruct the patient/family  to call [phone] to schedule this appointment if they wish to F/U with us.   - Pt would also benefit from Outpt Oncology Referral for further eval and discuss other options if in line with GOC.   - Further care per primary team.  - Neurology will sign off at this point of time. please call back with any questions.     Primary team NP made aware.     D/W Neuro attending Dr. Trujillo.   
89 yo F w/ hx of DM2, HTN, OA, spinal stenosis, CKD III, chronic anemia, hyperthyroidism, seizures on Keppra presented with difficulties ambulating and intermittent shortness of breath.   Found to have elevated Ddimer elevated, Xray foot negative.  Admitted for ambulatory dysfunction and r/o DVT/PE. CTangio chest  negative for PE.   Course c/b confusion and  delirium found to have enlargement of her known meningioma with new vasogenic edema, compression of the left frontal horn, and slight midline shift on CT head.   Neurosurgery consulted (Dr Rayo from [facility]) , as per neurosurgery, given age and co-morbidities, unlikely to be a good surgical candidate.   Neurology consulted, recommended considering steroid use?   Palliative on board: pt is full code   Evaluated by PT recommended home PT   Noted with episode of hypoglycemia overnight, endo consulted, insuline adjusted     
89 yo F w/ hx of DM2, HTN, OA, spinal stenosis, CKD III, chronic anemia, hyperthyroidism, seizures on Keppra presents with ambulatory dysfunction and SOB x1 day. DDimer elevated, Xray foot negative. Patient admitted for ambulatory dysfunction and r/o DVT/PE.
89 yo F w/ hx of DM2, HTN, OA, spinal stenosis, CKD III, chronic anemia, hyperthyroidism, seizures on Keppra presented with difficulties ambulating and intermittent shortness of breath.   Found to have elevated Ddimer elevated, Xray foot negative.  Admitted for ambulatory dysfunction and r/o DVT/PE. CTangio chest  negative for PE.   Course c/b confusion and  delirium found to have enlargement of her known meningioma with new vasogenic edema, compression of the left frontal horn, and slight midline shift on CT head.   Neurosurgery consulted (Dr Rayo from [facility]) , as per neurosurgery, given age and co-morbidities, unlikely to be a good surgical candidate.   Neurology consulted, recommended considering steroid use?   Palliative on board: pt is full code   Evaluated by PT recommended home PT   Noted with episode of hypoglycemia overnight, endo consulted, insuline adjusted   Plan for discharge home when blood sugar better controlled     
89 yo F w/ hx of DM2, HTN, OA, spinal stenosis, CKD III, chronic anemia, hyperthyroidism, seizures on Keppra presents with ambulatory dysfunction and SOB x1 day. DDimer elevated, Xray foot negative. Patient admitted for ambulatory dysfunction and r/o DVT/PE. CT-A negative for PE. Course c/b delirium found to have enlargement of her known meningioma with new vasogenic edema, compression of the left frontal horn, and slight midline shift on CT head. Per neurosurgery, given age and co-morbidities, unlikely to be a good surgical candidate. Family discussions in progress.

## 2025-03-05 NOTE — DIETITIAN INITIAL EVALUATION ADULT - ORAL INTAKE PTA/DIET HISTORY
Yes
Pt is from home, alert however disorientated and forgetful, verbally responsive. H/o brain mass, cervical spinal stenosis, anemia, DUB, obesity, HTN, anxiety, DM; admitted for joint pain of left ankle and foot. Pt is with fair PO intake PTA and in-house consuming ~50% of meals. Pt expresses that she is losing weight however unable to quantify. NFPE completed with results below. hewing/ swallowing issues reported. Denies any GI distress. No food preferences at this time. No known food allergies reported. Finger sticks range from  with HA1c 8.0% indicating poor glycemic control; however pt is not a candidate for nutrition education at this time.

## 2025-03-05 NOTE — PROGRESS NOTE ADULT - PROBLEM SELECTOR PROBLEM 3
D-dimer, elevated
Ambulatory dysfunction
Midline shift of brain with brain compression
D-dimer, elevated

## 2025-03-05 NOTE — PROGRESS NOTE ADULT - PROBLEM SELECTOR PLAN 2
H/o DM on home Januvia 50mg, Tresiba 20u, Novolog 6u premeal  episode of hypoglycemia for the past two nights   -endo  consulted   - Lantus  discontinued   - continue Ademelog and Low ISSC  -Monitor sugars and adjust as indicated   -A1c 8  -Con carb diet
H/o Meningioma  -CT Head showing: Interval enlargement of hyperdense partially calcified extra-axial parafalcine mass, currently 4.5 x 3.3 x 4.3 cm, previously 3.2 x 2.2 x 3.0 cm (maximal AP x TV x CC dimensions). New extensive left frontal vasogenic edema. New mass effect upon the left frontal horn. New rightward midline shift of 2 to 3 mm at the level of the foramen of Monro.  -NeuroSx does not rec sx given risk factors  -Neuro Dr. Trujillo consulted: considering steroids   -Palliative care following (pt full code for now)
Patient noted to have vasogenic edema of her left frontal lobe that is new  -Discussed steroids with neurosurgery (Dr. Rayo) but they do not feel they are needed at this time given patient's encouraging neuro exam at this time  -Will hold off on steroids for now given hyperglycemia and ongoing delirium but will reconsider per clinical course  -Neurosurgery to reach out to the family directly  -ICU consulted, not an ICU candidate given that this mass appears to be slow-growing  -Will do neurochecks qshift for now  -Will consider palliative evaluation in the AM
H/o Meningioma  -CT Head showing: Interval enlargement of hyperdense partially calcified extra-axial parafalcine mass, currently 4.5 x 3.3 x 4.3 cm, previously 3.2 x 2.2 x 3.0 cm (maximal AP x TV x CC dimensions). New extensive left frontal vasogenic edema. New mass effect upon the left frontal horn. New rightward midline shift of 2 to 3 mm at the level of the foramen of Monro.  -NeuroSx does not rec sx given risk factors  -Neuro Dr. Trujillo consulted  -Palliative care following (pt full code for now)

## 2025-03-05 NOTE — CONSULT NOTE ADULT - REASON FOR ADMISSION
Ambulatory Dysfunction, r/o PE

## 2025-03-05 NOTE — CONSULT NOTE ADULT - ASSESSMENT
89 yo F w/ hx of DM2, HTN, OA, spinal stenosis, CKD III, chronic anemia, hyperthyroidism, seizures on Keppra presents with ambulatory dysfunction x1 day.  Endocrine consulted for dm management. Per pts daughter she gives lantus 20 units qam and premeal insulin in a scle starting at 2 units .Denies hypoglycemia as out pt ? Currently not eating much

## 2025-03-05 NOTE — PROGRESS NOTE ADULT - PROBLEM SELECTOR PLAN 3
P/w left foot pain limiting ability to ambulate  -ambulates with cane at baseline  -Xray left foot negative for acute fracture  -likely chronic arthritic changes  -partial weight bearing, ambulate with assistance  -PT recs Home PT   -fall precautions  -C/w Tylenol 650mg q6hr prn and lidocaine patch for pain
P/w SOB and left foot pain  -D Dimer 2839  -96% O2 sat on RA, HR stable  -No s/sx of compartment syndrome    -Duplex US b/l LE negative for DVT   -CTA Chest negative for PE  -C/w Lovenox 30mg daily
P/w SOB and left foot pain  -D Dimer 2839  -96% O2 sat on RA, HR stable  -No s/sx of compartment syndrome  -S/p benadryl x1 and solumedrol x1 iso IV contrast allergy  -Duplex US b/l LE negative for DVT   -CTA Chest r/o PE negative   -C/w Lovenox 30mg daily
Patient noted to have midline shift of the brain with compression of the left frontal horn  -Case discussed with neurosurgery at [facility] (Dr. Rayo). Given age and co-morbidities, unlikely to be a good surgical candidate and performing surgery may be worse than not doing it given its extensive nature  -Family (Alfonso and Lorena) aware of findings, discussing amongst themselves what they would like the next steps to be  -Neurosurgery to reach out to family directly   -ICU consulted, not an ICU candidate given that this mass appears to be slow-growing  -Will do neurochecks qshift for now  -Will consider palliative evaluation in the AM

## 2025-03-05 NOTE — DIETITIAN INITIAL EVALUATION ADULT - PERTINENT MEDS FT
MEDICATIONS  (STANDING):  amLODIPine   Tablet 10 milliGRAM(s) Oral daily  atorvastatin 10 milliGRAM(s) Oral at bedtime  enoxaparin Injectable 30 milliGRAM(s) SubCutaneous every 24 hours  hydrALAZINE 50 milliGRAM(s) Oral two times a day  insulin glargine Injectable (LANTUS) 10 Unit(s) SubCutaneous at bedtime  insulin lispro (ADMELOG) corrective regimen sliding scale   SubCutaneous three times a day before meals  insulin lispro (ADMELOG) corrective regimen sliding scale   SubCutaneous at bedtime  insulin lispro Injectable (ADMELOG) 5 Unit(s) SubCutaneous three times a day before meals  levETIRAcetam 250 milliGRAM(s) Oral two times a day  methimazole 5 milliGRAM(s) Oral daily    MEDICATIONS  (PRN):  acetaminophen     Tablet .. 650 milliGRAM(s) Oral every 6 hours PRN Temp greater or equal to 38C (100.4F), Mild Pain (1 - 3)  aluminum hydroxide/magnesium hydroxide/simethicone Suspension 30 milliLiter(s) Oral every 4 hours PRN Dyspepsia  melatonin 3 milliGRAM(s) Oral at bedtime PRN Insomnia  ondansetron Injectable 4 milliGRAM(s) IV Push every 8 hours PRN Nausea and/or Vomiting

## 2025-03-05 NOTE — DISCHARGE NOTE NURSING/CASE MANAGEMENT/SOCIAL WORK - FINANCIAL ASSISTANCE
[facility] provides services at a reduced cost to those who are determined to be eligible through [facility]’s financial assistance program. Information regarding [facility]’s financial assistance program can be found by going to [email or web address] or by calling [phone].

## 2025-03-05 NOTE — PROGRESS NOTE ADULT - PROBLEM SELECTOR PLAN 5
H/o hyperthyroidism on home methimazole 5mg qD  -TSH 1.41  c/w home meds
presented with SOB and left foot pain and found to have a DDimer 2839  -LE dopplers negative for DVT  -CT-A negative grossly for PE  -No ssx of compartment syndrome  -s/p benadryl x1 and solumedrol x1 iso IV contrast allergy  -DVT ppx with Lovenox
H/o hyperthyroidism on home methimazole 5mg qD  -TSH 1.41  c/w home meds
H/o hyperthyroidism on home methimazole 5mg qD  -TSH 1.41  c/w home meds

## 2025-03-05 NOTE — PROGRESS NOTE ADULT - PROBLEM SELECTOR PLAN 7
Hx of hyperthyroidism on home methimazole 5mg qD  -f/u TSH  -c/w home meds
H/o HLD on home atorvastatin 10mg  -C/w Atorvastatin 10mg at HS   -DASH Diet

## 2025-03-05 NOTE — PROGRESS NOTE ADULT - NS ATTEND AMEND GEN_ALL_CORE FT
Patient seen and examined. Case discussed with KASSANDRA Westbrook. Patient rested well overnight and offers no complaints this AM. On exam, she remains AOx2 (thinks it is 1970) but otherwise appears neurologically intact. She denies any headaches at this time. Case discussed at length with Dr. Trujillo of neurology as well as Dr. FleischerBlack of palliative care. Per discussion with palliative care, patient and family, patient would like for a trial of critical care, including intubation and CPR if necessary. Records obtained from patient's recent hospitalization at [facility] in February and patient had a head CT and MRI performed at that time. Per the records, the parafalcine mass was seen as well as the vasogenic edema in the left frontal lobe as well as the mass effect on the frontal horn and the new midline shift. Case discussed with neurology and patient may benefit from a course of steroids for the vasogenic edema but they will review the records from [place] to determine this. Will follow-up recommendations. Patient remains profoundly hyperglycemic to the 300's. Will increase Lantus back to home 20U QHS and increase premeal insulin to 7/7/7. A1c is 8.0 at this time. If patient is to be placed on steroids, would anticipate further hyperglycemia and may need to consider endocrinology consult. Hypophoshatemia - replete. PT recommends home with home PT. Remaining care as noted above.
89 yo W with meningioma with mass effect and shift which appears to be chronic since OCt 2024 per [facility] records, was on steroids at that time, with complications of hyperglycemia.  Given the chronicity of the mass effect and shift, I will not recommend steroids at this time.  Will recommend that the patient follows up with neurootologist for further recommendations on steroids, if needed.  Pls call back with questions.  dw daughter.    spemt 45min
#Hypoglycemia   #Meningioma   #Vasogenic brain edema   #Midline shift   #Gait instability   #Type 2 DM   #Hyperthyroid   #HTN  #HLD  #CKD3   #AOCD  #Spinal stenosis   #DVT ppx     89yF with PMH of type 2 DM, OA, spinal stenosis, CKD III, chronic anemia, hyperthyroidism, seizures on Keppra presents with ambulatory dysfunction and SOB x1 day. Admitted for ambulatory dysfunction and r/o PE/DVT. Hospital course complicated by findings of meningioma and midline shift causing compression of the left frontal horn, later found to be previously known. Upon discussion with neurosurgery, neither surgery nor steroids are indicated. Patient seen at bedside, daughter present. Overnight patient was hypoglycemic to 39, remained asymptomatic, and improved with D50. This morning, patient sleepy, however, patient's daughter reports that she did not get much sleep last night. Per daughter, patient takes Lantus 10-15 units at home; BG has been as low as 50-80s, and patient is asymptomatic.     PE: as above; vitals reviewed, NAD, abdomen soft/nontender, A+Ox3, no le edema  Labs reviewed: CBC, BMP, Mg, Phos; monitor daily     -CTH findings showing vasogenic edema, meningioma - however, per neurosurgery, Dr. Rayo, patient will be a poor surgical candidate, and steroids not indicated as neuro exam is benign   -XR L-foot negative  -PE/DVT workup negative  -c/w home meds for chronic conditions   -ACHS FS, ISS  -endo Dr. Villarreal following  -seen by palliative, patient electing to remain full code   -PT rec: home therapy
#Meningioma   #Vasogenic brain edema   #Midline shift   #Gait instability   #Type 2 DM   #Hyperthyroid   #HTN  #HLD  #CKD3   #AOCD  #Spinal stenosis   #DVT ppx     89yF with PMH of type 2 DM, OA, spinal stenosis, CKD III, chronic anemia, hyperthyroidism, seizures on Keppra presents with ambulatory dysfunction and SOB x1 day. Admitted for ambulatory dysfunction and r/o PE/DVT. Hospital course complicated by findings of meningioma and midline shift causing compression of the left frontal horn, later found to be previously known. Upon discussion with neurosurgery, neither surgery nor steroids are indicated. Patient seen at bedside, family present, including daughter. Plan of care discussed, family in agreement.     PE: as above; vitals reviewed, NAD, abdomen soft/nontender, A+Ox3, no le edema  Labs reviewed: CBC, BMP, Mg, Phos; monitor daily     -CTH findings showing vasogenic edema, meningioma - however, per neurosurgery, Dr. Rayo, patient will be a poor surgical candidate, and steroids not indicated as neuro exam is benign   -seen by ICU  -XR L-foot negative  -PE/DVT workup negative  -c/w home meds for chronic conditions   -ACHS FS, ISS  -seen by palliative, patient electing to remain full code   -I discussed this again with the family and patient. Family is in agreement that the risks of CPR and mechanical ventilation outweigh the benefits, however, they state that patient is of sound mind. Family to further discuss this with patient at a later date and with PCP, Dr. Camarena.   -PT rec: home therapy

## 2025-03-05 NOTE — PROGRESS NOTE ADULT - PROBLEM SELECTOR PLAN 11
DVT ppx Lovenox
hx of chronic anemia  hgb 9.7  unknown baseline  monitor cbc

## 2025-03-05 NOTE — CONSULT NOTE ADULT - PROBLEM SELECTOR RECOMMENDATION 9
Has known hx of meningioma  CT head here concerning for herniation  NSGY spoke with family and recommended against surgical intervention given age and comorbidities  Neurology consulting. Discussed briefly with Dr. Guerra  Had hospitalization in [place] recently in which she was started on sz meds  Dr. Larose obtained records from that hospitalization and CT head read seemed similar to the one done here  Seems likely that the mass is responsible for the gait instability  May benefit from steroids to dec edema  continue keppra
uncontrolled with swings- s/p hypoglycemia  likely due to diet - on puree till yesterday   rec hold all standing doses of insulin  low dose admelog for now  consider low dose lantus in am if fsg > 200  aim fsg 140-<200  a1c-8%

## 2025-03-05 NOTE — CONSULT NOTE ADULT - SUBJECTIVE AND OBJECTIVE BOX
Patient is a 89y old  Female who presents with a chief complaint of Ambulatory Dysfunction, r/o PE (04 Mar 2025 14:50)      HPI:  89 yo F w/ hx of DM2, HTN, OA, spinal stenosis, CKD III, chronic anemia, hyperthyroidism, seizures on Keppra presents with ambulatory dysfunction x1 day. Per patient and daughter at bedside, patient was "bent over in pain" and "walking funny" while trying to walk to bathroom this AM. Per daughter, patient normally ambulates with cane and assistance, had no issues day prior. Denies any trauma, fall, LOC, dizziness, or seizure like activity. Patient normally lives with daughter in [place] and is now visiting daughter in [place] after a 2 hour car ride last Saturday. Patient daughter also reports patient had some SOB after walking to bathroom. Of note, patient had recent hospitalization in [place] for fall and was started on seizure medications.    In ED:   VS: /67 / HR 77 / T 97.7 / 96%02 on RA  WBC 13.24  Hgb 9.7  DDimer 2839  s/p solumedrol x1, flexeril x1, benadryl x1 (01 Mar 2025 23:58)      PAST MEDICAL & SURGICAL HISTORY:  Diabetes mellitus  Type 2 IDDM      Arthritis      Spinal stenosis, lumbar      Dyslipidemia      Anxiety      HTN (hypertension)      Obesity      Spinal stenosis      Cataract      DUB (dysfunctional uterine bleeding)  hysterectomy age 30's      Anemia      Cervical spinal stenosis      Mass, brain      History of hysterectomy  age: 30's      History of appendectomy  1960's      History of cholecystectomy  1960's      H/O mastoidectomy  Right ear      Hx of cataract surgery  bilateral 2008      History of total knee replacement  right 2006  left 2011      History of colon polyps removal  colonoscopy 2010      L3- L5 laminectomy with fusion  2/2013             MEDICATIONS  (STANDING):  amLODIPine   Tablet 10 milliGRAM(s) Oral daily  atorvastatin 10 milliGRAM(s) Oral at bedtime  enoxaparin Injectable 30 milliGRAM(s) SubCutaneous every 24 hours  hydrALAZINE 50 milliGRAM(s) Oral two times a day  insulin glargine Injectable (LANTUS) 10 Unit(s) SubCutaneous at bedtime  insulin lispro (ADMELOG) corrective regimen sliding scale   SubCutaneous three times a day before meals  insulin lispro (ADMELOG) corrective regimen sliding scale   SubCutaneous at bedtime  insulin lispro Injectable (ADMELOG) 5 Unit(s) SubCutaneous three times a day before meals  levETIRAcetam 250 milliGRAM(s) Oral two times a day  methimazole 5 milliGRAM(s) Oral daily    MEDICATIONS  (PRN):  acetaminophen     Tablet .. 650 milliGRAM(s) Oral every 6 hours PRN Temp greater or equal to 38C (100.4F), Mild Pain (1 - 3)  aluminum hydroxide/magnesium hydroxide/simethicone Suspension 30 milliLiter(s) Oral every 4 hours PRN Dyspepsia  melatonin 3 milliGRAM(s) Oral at bedtime PRN Insomnia  ondansetron Injectable 4 milliGRAM(s) IV Push every 8 hours PRN Nausea and/or Vomiting      FAMILY HISTORY:      SOCIAL HISTORY:      REVIEW OF SYSTEMS:  CONSTITUTIONAL: No fever, weight loss, or fatigue  EYES: No eye pain, visual disturbances, or discharge  ENT:  No difficulty hearing, tinnitus, vertigo; No sinus or throat pain  NECK: No pain or stiffness  RESPIRATORY: No cough, wheezing, chills or hemoptysis; No Shortness of Breath  CARDIOVASCULAR: No chest pain, palpitations, passing out, dizziness, or leg swelling  GASTROINTESTINAL: No abdominal or epigastric pain. No nausea, vomiting, or hematemesis; No diarrhea or constipation. No melena or hematochezia.  GENITOURINARY: No dysuria, frequency, hematuria, or incontinence  NEUROLOGICAL: No headaches, memory loss, loss of strength, numbness, or tremors  SKIN: No itching, burning, rashes, or lesions   LYMPH Nodes: No enlarged glands  ENDOCRINE: No heat or cold intolerance; No hair loss  MUSCULOSKELETAL: No joint pain or swelling; No muscle, back, or extremity pain  PSYCHIATRIC: No depression, anxiety, mood swings, or difficulty sleeping  HEME/LYMPH: No easy bruising, or bleeding gums  ALLERGY AND IMMUNOLOGIC: No hives or eczema	        Vital Signs Last 24 Hrs  T(C): 36.3 (04 Mar 2025 20:35), Max: 36.4 (04 Mar 2025 14:30)  T(F): 97.4 (04 Mar 2025 20:35), Max: 97.6 (04 Mar 2025 14:30)  HR: 88 (04 Mar 2025 20:35) (73 - 88)  BP: 143/60 (04 Mar 2025 20:35) (116/69 - 143/60)  BP(mean): --  RR: 19 (04 Mar 2025 20:35) (17 - 19)  SpO2: 94% (04 Mar 2025 20:35) (94% - 98%)    Parameters below as of 04 Mar 2025 20:35  Patient On (Oxygen Delivery Method): room air          Constitutional:    NC/AT:    HEENT:    Neck:  No JVD, bruits or thyromegaly    Respiratory:  Clear without rales or rhonchi    Cardiovascular:  RR without murmur, rub or gallop.    Gastrointestinal: Soft without hepatosplenomegaly.    Extremities: without cyanosis, clubbing or edema.    Neurological:  Oriented   x      . No gross sensory or motor defects.        LABS:                        9.5    7.54  )-----------( 320      ( 04 Mar 2025 05:46 )             29.4     03-04    141  |  109[H]  |  17  ----------------------------<  196[H]  3.3[L]   |  26  |  0.81    Ca    8.8      04 Mar 2025 05:46            Urinalysis Basic - ( 04 Mar 2025 05:46 )    Color: x / Appearance: x / SG: x / pH: x  Gluc: 196 mg/dL / Ketone: x  / Bili: x / Urobili: x   Blood: x / Protein: x / Nitrite: x   Leuk Esterase: x / RBC: x / WBC x   Sq Epi: x / Non Sq Epi: x / Bacteria: x      CAPILLARY BLOOD GLUCOSE      POCT Blood Glucose.: 335 mg/dL (05 Mar 2025 08:08)  POCT Blood Glucose.: 174 mg/dL (04 Mar 2025 22:26)  POCT Blood Glucose.: 39 mg/dL (04 Mar 2025 21:48)  POCT Blood Glucose.: 105 mg/dL (04 Mar 2025 17:13)  POCT Blood Glucose.: 179 mg/dL (04 Mar 2025 11:38)      RADIOLOGY & ADDITIONAL STUDIES: Patient is a 89y old  Female who presents with a chief complaint of Ambulatory Dysfunction, r/o PE (04 Mar 2025 14:50)      HPI:  89 yo F w/ hx of DM2, HTN, OA, spinal stenosis, CKD III, chronic anemia, hyperthyroidism, seizures on Keppra presents with ambulatory dysfunction x1 day. Per patient and daughter at bedside, patient was "bent over in pain" and "walking funny" while trying to walk to bathroom this AM. Per daughter, patient normally ambulates with cane and assistance, had no issues day prior. Denies any trauma, fall, LOC, dizziness, or seizure like activity. Patient normally lives with daughter in [place] and is now visiting daughter in [place] after a 2 hour car ride last Saturday. Patient daughter also reports patient had some SOB after walking to bathroom. Of note, patient had recent hospitalization in [place] for fall and was started on seizure medications. Endocrine consulted for dm management. Per pts daughter she gives lantus 20 units qam and premeal insulin in a scle starting at 2 units .Denies hypoglycemia as out pt ? Currently not eating much    In ED:   VS: /67 / HR 77 / T 97.7 / 96%02 on RA  WBC 13.24  Hgb 9.7  DDimer 2839  s/p solumedrol x1, flexeril x1, benadryl x1 (01 Mar 2025 23:58)      PAST MEDICAL & SURGICAL HISTORY:  Diabetes mellitus  Type 2 IDDM      Arthritis      Spinal stenosis, lumbar      Dyslipidemia      Anxiety      HTN (hypertension)      Obesity      Spinal stenosis      Cataract      DUB (dysfunctional uterine bleeding)  hysterectomy age 30's      Anemia      Cervical spinal stenosis      Mass, brain      History of hysterectomy  age: 30's      History of appendectomy  1960's      History of cholecystectomy  1960's      H/O mastoidectomy  Right ear      Hx of cataract surgery  bilateral 2008      History of total knee replacement  right 2006  left 2011      History of colon polyps removal  colonoscopy 2010      L3- L5 laminectomy with fusion  2/2013             MEDICATIONS  (STANDING):  amLODIPine   Tablet 10 milliGRAM(s) Oral daily  atorvastatin 10 milliGRAM(s) Oral at bedtime  enoxaparin Injectable 30 milliGRAM(s) SubCutaneous every 24 hours  hydrALAZINE 50 milliGRAM(s) Oral two times a day  insulin glargine Injectable (LANTUS) 10 Unit(s) SubCutaneous at bedtime  insulin lispro (ADMELOG) corrective regimen sliding scale   SubCutaneous three times a day before meals  insulin lispro (ADMELOG) corrective regimen sliding scale   SubCutaneous at bedtime  insulin lispro Injectable (ADMELOG) 5 Unit(s) SubCutaneous three times a day before meals  levETIRAcetam 250 milliGRAM(s) Oral two times a day  methimazole 5 milliGRAM(s) Oral daily    MEDICATIONS  (PRN):  acetaminophen     Tablet .. 650 milliGRAM(s) Oral every 6 hours PRN Temp greater or equal to 38C (100.4F), Mild Pain (1 - 3)  aluminum hydroxide/magnesium hydroxide/simethicone Suspension 30 milliLiter(s) Oral every 4 hours PRN Dyspepsia  melatonin 3 milliGRAM(s) Oral at bedtime PRN Insomnia  ondansetron Injectable 4 milliGRAM(s) IV Push every 8 hours PRN Nausea and/or Vomiting      FAMILY HISTORY:      SOCIAL HISTORY:      REVIEW OF SYSTEMS: NA	        Vital Signs Last 24 Hrs  T(C): 36.3 (04 Mar 2025 20:35), Max: 36.4 (04 Mar 2025 14:30)  T(F): 97.4 (04 Mar 2025 20:35), Max: 97.6 (04 Mar 2025 14:30)  HR: 88 (04 Mar 2025 20:35) (73 - 88)  BP: 143/60 (04 Mar 2025 20:35) (116/69 - 143/60)  BP(mean): --  RR: 19 (04 Mar 2025 20:35) (17 - 19)  SpO2: 94% (04 Mar 2025 20:35) (94% - 98%)    Parameters below as of 04 Mar 2025 20:35  Patient On (Oxygen Delivery Method): room air          Constitutional:    HEENT: NAD    Neck:  No JVD, bruits or thyromegaly    Respiratory:  Clear without rales or rhonchi    Cardiovascular:  RR without murmur, rub or gallop.    Gastrointestinal: Soft without hepatosplenomegaly.    Extremities: without cyanosis, clubbing or edema.    Neurological:  Oriented   x      . No gross sensory or motor defects.        LABS:                        9.5    7.54  )-----------( 320      ( 04 Mar 2025 05:46 )             29.4     03-04    141  |  109[H]  |  17  ----------------------------<  196[H]  3.3[L]   |  26  |  0.81    Ca    8.8      04 Mar 2025 05:46            Urinalysis Basic - ( 04 Mar 2025 05:46 )    Color: x / Appearance: x / SG: x / pH: x  Gluc: 196 mg/dL / Ketone: x  / Bili: x / Urobili: x   Blood: x / Protein: x / Nitrite: x   Leuk Esterase: x / RBC: x / WBC x   Sq Epi: x / Non Sq Epi: x / Bacteria: x      CAPILLARY BLOOD GLUCOSE      POCT Blood Glucose.: 335 mg/dL (05 Mar 2025 08:08)  POCT Blood Glucose.: 174 mg/dL (04 Mar 2025 22:26)  POCT Blood Glucose.: 39 mg/dL (04 Mar 2025 21:48)  POCT Blood Glucose.: 105 mg/dL (04 Mar 2025 17:13)  POCT Blood Glucose.: 179 mg/dL (04 Mar 2025 11:38)      RADIOLOGY & ADDITIONAL STUDIES:

## 2025-03-05 NOTE — DISCHARGE NOTE NURSING/CASE MANAGEMENT/SOCIAL WORK - NSDCVIVACCINE_GEN_ALL_CORE_FT
Tdap; 07-Oct-2018 02:25; Patricia Barnard (ALKA); [employer]; [id number] (Exp. Date: 13-Feb-2021); IntraMuscular; Deltoid Right.; 0.5 milliLiter(s); VIS (VIS Published: 09-May-2013, VIS Presented: 07-Oct-2018);

## 2025-03-05 NOTE — DIETITIAN INITIAL EVALUATION ADULT - ADD RECOMMEND
Moderate malnutrition; Recommend oral supplement Glucerna Therapeutic Shake BID (220 kcal, 10 g pro each) as medically feasible.

## 2025-03-05 NOTE — PROGRESS NOTE ADULT - PROBLEM SELECTOR PLAN 4
P/w SOB and left foot pain  -D Dimer 2839  -96% O2 sat on RA, HR stable  -No s/sx of compartment syndrome    -Duplex US b/l LE negative for DVT   -CTA Chest negative for PE  -C/w Lovenox 30mg daily
H/o DM on home Januvia 50mg, Tresiba 20u, Novolog 6u premeal   on admission, episode of hypoglycemia last night   -hold home Januvia while inpatient  -endo  consulted   - Lantus decreased to 10 u, Ademelog 5 u premeal, and Low ISSC  -Monitor sugars and adjust as indicated   -A1c 8  -Con carb diet
presented with left foot pain limiting ability to ambulate at this time but ambulates with cane at baseline  -Xray left foot negative for acute fracture  -likely chronic arthritic changes  -partial weight bearing, ambulate with assistance  -f/u PT eval  -f/u podiatry consult  -fall precautions  -acetamenophen 650prn and lidocaine patch for pain
H/o DM on home Januvia 50mg, Tresiba 20u, Novolog 6u premeal   on admission  -hold home Januvia while inpatient  -C/w Lantus 15u, Ademelog 6u premeal, and Low ISSC  -Monitor sugars and adjust as indicated   -A1c 8  -Con carb diet

## 2025-03-05 NOTE — PROGRESS NOTE ADULT - PROBLEM SELECTOR PLAN 6
H/o HTN on Amlodipine 10mg daily and Hydralazine 50mg BID  c/w home meds with parameters  -Monitor Bp and adjust as indicated
hx of DM on home Januvia 50mg, Tresiba 20u, Novolog 6u premeal, on admission, now in the 300's likely in the setting of missing home Lantus and Solumedrol in the ED  -hold home Januvia  -continue Lantus 15u, Ademelog 6u premeal, and MICHELLE  -monitor sugars and adjust accordingly

## 2025-03-05 NOTE — PROGRESS NOTE ADULT - PROBLEM SELECTOR PROBLEM 8
HTN (hypertension)
Stage 3 chronic kidney disease

## 2025-03-05 NOTE — PROGRESS NOTE ADULT - PROBLEM SELECTOR PLAN 9
H/o chronic anemia  -hgb 9.7 (unknown baseline)  -monitor cbc
-hx of HLD on home atorvastatin 10mg
H/o chronic anemia  -hgb 9.7 (unknown baseline)  -monitor cbc
H/o chronic anemia  -hgb 9.7 (unknown baseline)  -monitor cbc

## 2025-03-06 VITALS
RESPIRATION RATE: 18 BRPM | SYSTOLIC BLOOD PRESSURE: 156 MMHG | HEART RATE: 81 BPM | DIASTOLIC BLOOD PRESSURE: 61 MMHG | TEMPERATURE: 98 F | OXYGEN SATURATION: 99 %

## 2025-03-06 LAB
ANION GAP SERPL CALC-SCNC: 9 MMOL/L — SIGNIFICANT CHANGE UP (ref 5–17)
BUN SERPL-MCNC: 17 MG/DL — SIGNIFICANT CHANGE UP (ref 7–18)
CALCIUM SERPL-MCNC: 8.8 MG/DL — SIGNIFICANT CHANGE UP (ref 8.4–10.5)
CHLORIDE SERPL-SCNC: 107 MMOL/L — SIGNIFICANT CHANGE UP (ref 96–108)
CO2 SERPL-SCNC: 23 MMOL/L — SIGNIFICANT CHANGE UP (ref 22–31)
CREAT SERPL-MCNC: 0.96 MG/DL — SIGNIFICANT CHANGE UP (ref 0.5–1.3)
EGFR: 55 ML/MIN/1.73M2 — LOW
EGFR: 55 ML/MIN/1.73M2 — LOW
GLUCOSE BLDC GLUCOMTR-MCNC: 274 MG/DL — HIGH (ref 70–99)
GLUCOSE BLDC GLUCOMTR-MCNC: 277 MG/DL — HIGH (ref 70–99)
GLUCOSE BLDC GLUCOMTR-MCNC: 338 MG/DL — HIGH (ref 70–99)
GLUCOSE BLDC GLUCOMTR-MCNC: 348 MG/DL — HIGH (ref 70–99)
GLUCOSE SERPL-MCNC: 330 MG/DL — HIGH (ref 70–99)
HCT VFR BLD CALC: 31.8 % — LOW (ref 34.5–45)
HGB BLD-MCNC: 10 G/DL — LOW (ref 11.5–15.5)
MCHC RBC-ENTMCNC: 25.7 PG — LOW (ref 27–34)
MCHC RBC-ENTMCNC: 31.4 G/DL — LOW (ref 32–36)
MCV RBC AUTO: 81.7 FL — SIGNIFICANT CHANGE UP (ref 80–100)
NRBC BLD AUTO-RTO: 0 /100 WBCS — SIGNIFICANT CHANGE UP (ref 0–0)
PLATELET # BLD AUTO: 328 K/UL — SIGNIFICANT CHANGE UP (ref 150–400)
POTASSIUM SERPL-MCNC: 4.1 MMOL/L — SIGNIFICANT CHANGE UP (ref 3.5–5.3)
POTASSIUM SERPL-SCNC: 4.1 MMOL/L — SIGNIFICANT CHANGE UP (ref 3.5–5.3)
RBC # BLD: 3.89 M/UL — SIGNIFICANT CHANGE UP (ref 3.8–5.2)
RBC # FLD: 15.6 % — HIGH (ref 10.3–14.5)
SODIUM SERPL-SCNC: 139 MMOL/L — SIGNIFICANT CHANGE UP (ref 135–145)
WBC # BLD: 6.23 K/UL — SIGNIFICANT CHANGE UP (ref 3.8–10.5)
WBC # FLD AUTO: 6.23 K/UL — SIGNIFICANT CHANGE UP (ref 3.8–10.5)

## 2025-03-06 PROCEDURE — 85730 THROMBOPLASTIN TIME PARTIAL: CPT

## 2025-03-06 PROCEDURE — 82962 GLUCOSE BLOOD TEST: CPT

## 2025-03-06 PROCEDURE — 73610 X-RAY EXAM OF ANKLE: CPT

## 2025-03-06 PROCEDURE — 85027 COMPLETE CBC AUTOMATED: CPT

## 2025-03-06 PROCEDURE — 36415 COLL VENOUS BLD VENIPUNCTURE: CPT

## 2025-03-06 PROCEDURE — 71275 CT ANGIOGRAPHY CHEST: CPT | Mod: MC

## 2025-03-06 PROCEDURE — 83880 ASSAY OF NATRIURETIC PEPTIDE: CPT

## 2025-03-06 PROCEDURE — 84443 ASSAY THYROID STIM HORMONE: CPT

## 2025-03-06 PROCEDURE — 81001 URINALYSIS AUTO W/SCOPE: CPT

## 2025-03-06 PROCEDURE — 71045 X-RAY EXAM CHEST 1 VIEW: CPT

## 2025-03-06 PROCEDURE — 80048 BASIC METABOLIC PNL TOTAL CA: CPT

## 2025-03-06 PROCEDURE — 93970 EXTREMITY STUDY: CPT

## 2025-03-06 PROCEDURE — 73630 X-RAY EXAM OF FOOT: CPT

## 2025-03-06 PROCEDURE — 92526 ORAL FUNCTION THERAPY: CPT

## 2025-03-06 PROCEDURE — 99239 HOSP IP/OBS DSCHRG MGMT >30: CPT

## 2025-03-06 PROCEDURE — 84484 ASSAY OF TROPONIN QUANT: CPT

## 2025-03-06 PROCEDURE — 80053 COMPREHEN METABOLIC PANEL: CPT

## 2025-03-06 PROCEDURE — 92610 EVALUATE SWALLOWING FUNCTION: CPT

## 2025-03-06 PROCEDURE — 87086 URINE CULTURE/COLONY COUNT: CPT

## 2025-03-06 PROCEDURE — 85610 PROTHROMBIN TIME: CPT

## 2025-03-06 PROCEDURE — 87637 SARSCOV2&INF A&B&RSV AMP PRB: CPT

## 2025-03-06 PROCEDURE — 83036 HEMOGLOBIN GLYCOSYLATED A1C: CPT

## 2025-03-06 PROCEDURE — 97116 GAIT TRAINING THERAPY: CPT

## 2025-03-06 PROCEDURE — 83735 ASSAY OF MAGNESIUM: CPT

## 2025-03-06 PROCEDURE — 97530 THERAPEUTIC ACTIVITIES: CPT

## 2025-03-06 PROCEDURE — 85379 FIBRIN DEGRADATION QUANT: CPT

## 2025-03-06 PROCEDURE — 99285 EMERGENCY DEPT VISIT HI MDM: CPT

## 2025-03-06 PROCEDURE — 96374 THER/PROPH/DIAG INJ IV PUSH: CPT

## 2025-03-06 PROCEDURE — 70450 CT HEAD/BRAIN W/O DYE: CPT | Mod: MC

## 2025-03-06 PROCEDURE — 84100 ASSAY OF PHOSPHORUS: CPT

## 2025-03-06 PROCEDURE — 97162 PT EVAL MOD COMPLEX 30 MIN: CPT

## 2025-03-06 PROCEDURE — 85025 COMPLETE CBC W/AUTO DIFF WBC: CPT

## 2025-03-06 RX ORDER — INSULIN LISPRO 100 U/ML
2 INJECTION, SOLUTION INTRAVENOUS; SUBCUTANEOUS
Refills: 0 | Status: DISCONTINUED | OUTPATIENT
Start: 2025-03-06 | End: 2025-03-06

## 2025-03-06 RX ORDER — LIDOCAINE HYDROCHLORIDE 20 MG/ML
1 JELLY TOPICAL
Qty: 3 | Refills: 0
Start: 2025-03-06 | End: 2025-03-20

## 2025-03-06 RX ORDER — INSULIN GLARGINE-YFGN 100 [IU]/ML
5 INJECTION, SOLUTION SUBCUTANEOUS EVERY MORNING
Refills: 0 | Status: DISCONTINUED | OUTPATIENT
Start: 2025-03-06 | End: 2025-03-06

## 2025-03-06 RX ORDER — INSULIN ASPART 100 [IU]/ML
2 INJECTION, SOLUTION INTRAVENOUS; SUBCUTANEOUS
Qty: 1 | Refills: 0
Start: 2025-03-06 | End: 2025-04-04

## 2025-03-06 RX ORDER — INSULIN GLARGINE-YFGN 100 [IU]/ML
5 INJECTION, SOLUTION SUBCUTANEOUS
Qty: 1 | Refills: 0
Start: 2025-03-06 | End: 2025-04-04

## 2025-03-06 RX ADMIN — INSULIN LISPRO 4: 100 INJECTION, SOLUTION INTRAVENOUS; SUBCUTANEOUS at 11:51

## 2025-03-06 RX ADMIN — LEVETIRACETAM 250 MILLIGRAM(S): 10 INJECTION, SOLUTION INTRAVENOUS at 06:01

## 2025-03-06 RX ADMIN — INSULIN LISPRO 4: 100 INJECTION, SOLUTION INTRAVENOUS; SUBCUTANEOUS at 08:23

## 2025-03-06 RX ADMIN — Medication 50 MILLIGRAM(S): at 06:01

## 2025-03-06 RX ADMIN — ENOXAPARIN SODIUM 30 MILLIGRAM(S): 100 INJECTION SUBCUTANEOUS at 08:22

## 2025-03-06 RX ADMIN — INSULIN GLARGINE-YFGN 5 UNIT(S): 100 INJECTION, SOLUTION SUBCUTANEOUS at 11:51

## 2025-03-06 RX ADMIN — AMLODIPINE BESYLATE 10 MILLIGRAM(S): 10 TABLET ORAL at 06:01

## 2025-03-06 RX ADMIN — INSULIN LISPRO 2 UNIT(S): 100 INJECTION, SOLUTION INTRAVENOUS; SUBCUTANEOUS at 11:51

## 2025-03-06 NOTE — PROGRESS NOTE ADULT - SUBJECTIVE AND OBJECTIVE BOX
Interval Events:      Allergies    vancomycin (Hives)  iodine (Swelling)  IV Contrast (Short breath)  codeine (Rash)  penicillin (Swelling)  aspirin (Stomach Upset)    Intolerances      Endocrine/Metabolic Medications:  atorvastatin 10 milliGRAM(s) Oral at bedtime  insulin lispro (ADMELOG) corrective regimen sliding scale   SubCutaneous three times a day before meals  insulin lispro (ADMELOG) corrective regimen sliding scale   SubCutaneous at bedtime  methimazole 5 milliGRAM(s) Oral daily      Vital Signs Last 24 Hrs  T(C): 36.4 (06 Mar 2025 05:19), Max: 36.7 (05 Mar 2025 14:19)  T(F): 97.5 (06 Mar 2025 05:19), Max: 98.1 (05 Mar 2025 14:19)  HR: 81 (06 Mar 2025 05:19) (81 - 85)  BP: 193/71 (06 Mar 2025 05:19) (101/84 - 193/71)  BP(mean): 112 (06 Mar 2025 05:19) (112 - 112)  RR: 18 (06 Mar 2025 05:19) (18 - 18)  SpO2: 98% (06 Mar 2025 05:19) (96% - 99%)    Parameters below as of 06 Mar 2025 05:19  Patient On (Oxygen Delivery Method): room air          PHYSICAL EXAM  All physical exam findings normal, except those marked:  General:	Alert, active, cooperative, NAD, well hydrated  .		[] Abnormal:  Neck		Normal: supple, no cervical adenopathy, no palpable thyroid  .		[] Abnormal:  Cardiovascular	Normal: regular rate, normal S1, S2, no murmurs  .		[] Abnormal:  Respiratory	Normal: no chest wall deformity, normal respiratory pattern, CTA B/L  .		[] Abnormal:  Abdominal	Normal: soft, ND, NT, bowel sounds present, no masses, no organomegaly  .		[] Abnormal:  		Normal normal genitalia, testes descended, circumcised/uncircumcised  .		Karrie stage:			Breast karrie:  .		Menstrual history:  .		[] Abnormal:  Extremities	Normal: FROM x4  .		[] Abnormal:  Skin		Normal: intact and not indurated, no rash, no acanthosis nigricans  .		[] Abnormal:  Neurologic	Normal: grossly intact  .		[] Abnormal:    LABS                        10.0   6.23  )-----------( 328      ( 06 Mar 2025 06:59 )             31.8                               139    |  107    |  17                  Calcium: 8.8   / iCa: x      (03-06 @ 06:59)    ----------------------------<  330       Magnesium: x                                4.1     |  23     |  0.96             Phosphorous: x          CAPILLARY BLOOD GLUCOSE      POCT Blood Glucose.: 348 mg/dL (06 Mar 2025 08:04)  POCT Blood Glucose.: 277 mg/dL (06 Mar 2025 05:29)  POCT Blood Glucose.: 219 mg/dL (05 Mar 2025 21:13)  POCT Blood Glucose.: 97 mg/dL (05 Mar 2025 16:34)  POCT Blood Glucose.: 57 mg/dL (05 Mar 2025 15:41)  POCT Blood Glucose.: 56 mg/dL (05 Mar 2025 15:40)  POCT Blood Glucose.: 215 mg/dL (05 Mar 2025 11:32)        Assesment/plan       Interval Events:  pt in nad    Allergies    vancomycin (Hives)  iodine (Swelling)  IV Contrast (Short breath)  codeine (Rash)  penicillin (Swelling)  aspirin (Stomach Upset)    Intolerances      Endocrine/Metabolic Medications:  atorvastatin 10 milliGRAM(s) Oral at bedtime  insulin lispro (ADMELOG) corrective regimen sliding scale   SubCutaneous three times a day before meals  insulin lispro (ADMELOG) corrective regimen sliding scale   SubCutaneous at bedtime  methimazole 5 milliGRAM(s) Oral daily      Vital Signs Last 24 Hrs  T(C): 36.4 (06 Mar 2025 05:19), Max: 36.7 (05 Mar 2025 14:19)  T(F): 97.5 (06 Mar 2025 05:19), Max: 98.1 (05 Mar 2025 14:19)  HR: 81 (06 Mar 2025 05:19) (81 - 85)  BP: 193/71 (06 Mar 2025 05:19) (101/84 - 193/71)  BP(mean): 112 (06 Mar 2025 05:19) (112 - 112)  RR: 18 (06 Mar 2025 05:19) (18 - 18)  SpO2: 98% (06 Mar 2025 05:19) (96% - 99%)    Parameters below as of 06 Mar 2025 05:19  Patient On (Oxygen Delivery Method): room air          PHYSICAL EXAM  All physical exam findings normal, except those marked:  General:	Alert, active, cooperative, NAD, well hydrated  .		[] Abnormal:  Neck		Normal: supple, no cervical adenopathy, no palpable thyroid  .		[] Abnormal:  Cardiovascular	Normal: regular rate, normal S1, S2, no murmurs  .		[] Abnormal:  Respiratory	Normal: no chest wall deformity, normal respiratory pattern, CTA B/L  .		[] Abnormal:  Abdominal	Normal: soft, ND, NT, bowel sounds present, no masses, no organomegaly  .		[] Abnormal:  		Normal normal genitalia, testes descended, circumcised/uncircumcised  .		Karrie stage:			Breast karrie:  .		Menstrual history:  .		[] Abnormal:  Extremities	Normal: FROM x4  .		[] Abnormal:  Skin		Normal: intact and not indurated, no rash, no acanthosis nigricans  .		[] Abnormal:  Neurologic	Normal: grossly intact  .		[] Abnormal:    LABS                        10.0   6.23  )-----------( 328      ( 06 Mar 2025 06:59 )             31.8                               139    |  107    |  17                  Calcium: 8.8   / iCa: x      (03-06 @ 06:59)    ----------------------------<  330       Magnesium: x                                4.1     |  23     |  0.96             Phosphorous: x          CAPILLARY BLOOD GLUCOSE      POCT Blood Glucose.: 348 mg/dL (06 Mar 2025 08:04)  POCT Blood Glucose.: 277 mg/dL (06 Mar 2025 05:29)  POCT Blood Glucose.: 219 mg/dL (05 Mar 2025 21:13)  POCT Blood Glucose.: 97 mg/dL (05 Mar 2025 16:34)  POCT Blood Glucose.: 57 mg/dL (05 Mar 2025 15:41)  POCT Blood Glucose.: 56 mg/dL (05 Mar 2025 15:40)  POCT Blood Glucose.: 215 mg/dL (05 Mar 2025 11:32)        Assesment/plan  89 yo F w/ hx of DM2, HTN, OA, spinal stenosis, CKD III, chronic anemia, hyperthyroidism, seizures on Keppra presents with ambulatory dysfunction x1 day.  Endocrine consulted for dm management. Per pts daughter she gives lantus 20 units qam and premeal insulin in a scle starting at 2 units .Denies hypoglycemia as out pt ? Currently not eating much         Problem/Recommendation - 1:  ·  Problem: Diabetes mellitus.   ·  Recommendation: uncontrolled with swings- s/p hypoglycemia  likely due to diet - on puree till yesterday   cont to have swings  start low dose lantus 5 units qam  admelog premeals 2 units if fsg >200  aim fsg 140-<200  fsg ac and hs  a1c-8%.     Problem/Recommendation - 2:  ·  Problem: Ambulatory dysfunction.   ·  Recommendation: tx per prim team.

## 2025-03-06 NOTE — PROGRESS NOTE ADULT - REASON FOR ADMISSION
Ambulatory Dysfunction, r/o PE

## 2025-03-10 PROBLEM — G93.89 OTHER SPECIFIED DISORDERS OF BRAIN: Chronic | Status: ACTIVE | Noted: 2025-03-03

## 2025-03-12 ENCOUNTER — APPOINTMENT (OUTPATIENT)
Dept: NEUROLOGY | Facility: CLINIC | Age: 89
End: 2025-03-12
Payer: MEDICARE

## 2025-03-12 ENCOUNTER — NON-APPOINTMENT (OUTPATIENT)
Age: 89
End: 2025-03-12

## 2025-03-12 VITALS
SYSTOLIC BLOOD PRESSURE: 154 MMHG | HEIGHT: 62 IN | HEART RATE: 92 BPM | TEMPERATURE: 98.3 F | OXYGEN SATURATION: 97 % | DIASTOLIC BLOOD PRESSURE: 73 MMHG

## 2025-03-12 DIAGNOSIS — D32.0 BENIGN NEOPLASM OF CEREBRAL MENINGES: ICD-10-CM

## 2025-03-12 DIAGNOSIS — R26.2 DIFFICULTY IN WALKING, NOT ELSEWHERE CLASSIFIED: ICD-10-CM

## 2025-03-12 DIAGNOSIS — Z83.3 FAMILY HISTORY OF DIABETES MELLITUS: ICD-10-CM

## 2025-03-12 DIAGNOSIS — Z82.49 FAMILY HISTORY OF ISCHEMIC HEART DISEASE AND OTHER DISEASES OF THE CIRCULATORY SYSTEM: ICD-10-CM

## 2025-03-12 DIAGNOSIS — R56.9 UNSPECIFIED CONVULSIONS: ICD-10-CM

## 2025-03-12 DIAGNOSIS — Z86.39 PERSONAL HISTORY OF OTHER ENDOCRINE, NUTRITIONAL AND METABOLIC DISEASE: ICD-10-CM

## 2025-03-12 DIAGNOSIS — G93.40 ENCEPHALOPATHY, UNSPECIFIED: ICD-10-CM

## 2025-03-12 PROCEDURE — 99215 OFFICE O/P EST HI 40 MIN: CPT

## 2025-03-12 PROCEDURE — 99496 TRANSJ CARE MGMT HIGH F2F 7D: CPT

## 2025-03-12 RX ORDER — INSULIN GLARGINE 100 [IU]/ML
100 INJECTION, SOLUTION SUBCUTANEOUS
Refills: 0 | Status: ACTIVE | COMMUNITY

## 2025-03-12 RX ORDER — METHIMAZOLE 5 MG/1
5 TABLET ORAL
Refills: 0 | Status: ACTIVE | COMMUNITY

## 2025-03-12 RX ORDER — ATORVASTATIN CALCIUM 10 MG/1
10 TABLET, FILM COATED ORAL
Refills: 0 | Status: ACTIVE | COMMUNITY

## 2025-03-12 RX ORDER — HYDRALAZINE HYDROCHLORIDE 50 MG/1
50 TABLET ORAL
Refills: 0 | Status: ACTIVE | COMMUNITY

## 2025-03-12 RX ORDER — SITAGLIPTIN 50 MG/1
50 TABLET, FILM COATED ORAL
Refills: 0 | Status: ACTIVE | COMMUNITY

## 2025-03-12 RX ORDER — LEVETIRACETAM 250 MG/1
250 TABLET, FILM COATED ORAL
Refills: 0 | Status: ACTIVE | COMMUNITY

## 2025-03-12 RX ORDER — AMLODIPINE BESYLATE 10 MG/1
10 TABLET ORAL
Refills: 0 | Status: ACTIVE | COMMUNITY

## 2025-03-15 ENCOUNTER — INPATIENT (INPATIENT)
Facility: HOSPITAL | Age: 89
LOS: 4 days | Discharge: HOME | DRG: 872 | End: 2025-03-20
Attending: STUDENT IN AN ORGANIZED HEALTH CARE EDUCATION/TRAINING PROGRAM | Admitting: STUDENT IN AN ORGANIZED HEALTH CARE EDUCATION/TRAINING PROGRAM
Payer: COMMERCIAL

## 2025-03-15 VITALS
OXYGEN SATURATION: 96 % | WEIGHT: 145.06 LBS | HEART RATE: 89 BPM | DIASTOLIC BLOOD PRESSURE: 72 MMHG | TEMPERATURE: 100 F | RESPIRATION RATE: 16 BRPM | SYSTOLIC BLOOD PRESSURE: 161 MMHG

## 2025-03-15 RX ORDER — ACETAMINOPHEN 500 MG/5ML
1000 LIQUID (ML) ORAL ONCE
Refills: 0 | Status: COMPLETED | OUTPATIENT
Start: 2025-03-15 | End: 2025-03-15

## 2025-03-15 RX ORDER — CEFTRIAXONE 500 MG/1
1000 INJECTION, POWDER, FOR SOLUTION INTRAMUSCULAR; INTRAVENOUS ONCE
Refills: 0 | Status: COMPLETED | OUTPATIENT
Start: 2025-03-15 | End: 2025-03-15

## 2025-03-16 DIAGNOSIS — A41.9 SEPSIS, UNSPECIFIED ORGANISM: ICD-10-CM

## 2025-03-16 DIAGNOSIS — N39.0 URINARY TRACT INFECTION, SITE NOT SPECIFIED: ICD-10-CM

## 2025-03-16 DIAGNOSIS — E78.5 HYPERLIPIDEMIA, UNSPECIFIED: ICD-10-CM

## 2025-03-16 DIAGNOSIS — E05.90 THYROTOXICOSIS, UNSPECIFIED WITHOUT THYROTOXIC CRISIS OR STORM: ICD-10-CM

## 2025-03-16 DIAGNOSIS — G93.41 METABOLIC ENCEPHALOPATHY: ICD-10-CM

## 2025-03-16 DIAGNOSIS — R56.9 UNSPECIFIED CONVULSIONS: ICD-10-CM

## 2025-03-16 DIAGNOSIS — I10 ESSENTIAL (PRIMARY) HYPERTENSION: ICD-10-CM

## 2025-03-16 DIAGNOSIS — D32.9 BENIGN NEOPLASM OF MENINGES, UNSPECIFIED: ICD-10-CM

## 2025-03-16 DIAGNOSIS — E11.9 TYPE 2 DIABETES MELLITUS WITHOUT COMPLICATIONS: ICD-10-CM

## 2025-03-16 DIAGNOSIS — N18.30 CHRONIC KIDNEY DISEASE, STAGE 3 UNSPECIFIED: ICD-10-CM

## 2025-03-16 DIAGNOSIS — D64.9 ANEMIA, UNSPECIFIED: ICD-10-CM

## 2025-03-16 DIAGNOSIS — Z29.9 ENCOUNTER FOR PROPHYLACTIC MEASURES, UNSPECIFIED: ICD-10-CM

## 2025-03-16 LAB
ALBUMIN SERPL ELPH-MCNC: 2.3 G/DL — LOW (ref 3.5–5)
ALBUMIN SERPL ELPH-MCNC: 2.7 G/DL — LOW (ref 3.5–5)
ALP SERPL-CCNC: 71 U/L — SIGNIFICANT CHANGE UP (ref 40–120)
ALP SERPL-CCNC: 79 U/L — SIGNIFICANT CHANGE UP (ref 40–120)
ALT FLD-CCNC: 16 U/L DA — SIGNIFICANT CHANGE UP (ref 10–60)
ALT FLD-CCNC: 17 U/L DA — SIGNIFICANT CHANGE UP (ref 10–60)
ANION GAP SERPL CALC-SCNC: 12 MMOL/L — SIGNIFICANT CHANGE UP (ref 5–17)
APPEARANCE UR: CLEAR — SIGNIFICANT CHANGE UP
APTT BLD: 32.5 SEC — SIGNIFICANT CHANGE UP (ref 24.5–35.6)
AST SERPL-CCNC: 19 U/L — SIGNIFICANT CHANGE UP (ref 10–40)
AST SERPL-CCNC: 23 U/L — SIGNIFICANT CHANGE UP (ref 10–40)
BASOPHILS # BLD AUTO: 0.02 K/UL — SIGNIFICANT CHANGE UP (ref 0–0.2)
BASOPHILS NFR BLD AUTO: 0.1 % — SIGNIFICANT CHANGE UP (ref 0–2)
BILIRUB SERPL-MCNC: 0.6 MG/DL — SIGNIFICANT CHANGE UP (ref 0.2–1.2)
BILIRUB SERPL-MCNC: 0.7 MG/DL — SIGNIFICANT CHANGE UP (ref 0.2–1.2)
BILIRUB UR-MCNC: NEGATIVE — SIGNIFICANT CHANGE UP
BUN SERPL-MCNC: 15 MG/DL — SIGNIFICANT CHANGE UP (ref 7–18)
BUN SERPL-MCNC: 16 MG/DL — SIGNIFICANT CHANGE UP (ref 7–18)
CALCIUM SERPL-MCNC: 8.5 MG/DL — SIGNIFICANT CHANGE UP (ref 8.4–10.5)
CALCIUM SERPL-MCNC: 9.2 MG/DL — SIGNIFICANT CHANGE UP (ref 8.4–10.5)
CHLORIDE SERPL-SCNC: 104 MMOL/L — SIGNIFICANT CHANGE UP (ref 96–108)
CHLORIDE SERPL-SCNC: 105 MMOL/L — SIGNIFICANT CHANGE UP (ref 96–108)
CO2 SERPL-SCNC: 20 MMOL/L — LOW (ref 22–31)
CO2 SERPL-SCNC: 22 MMOL/L — SIGNIFICANT CHANGE UP (ref 22–31)
COLOR SPEC: YELLOW — SIGNIFICANT CHANGE UP
CREAT SERPL-MCNC: 0.91 MG/DL — SIGNIFICANT CHANGE UP (ref 0.5–1.3)
CREAT SERPL-MCNC: 0.94 MG/DL — SIGNIFICANT CHANGE UP (ref 0.5–1.3)
DIFF PNL FLD: NEGATIVE — SIGNIFICANT CHANGE UP
EGFR: 56 ML/MIN/1.73M2 — LOW
EGFR: 56 ML/MIN/1.73M2 — LOW
EGFR: 58 ML/MIN/1.73M2 — LOW
EGFR: 58 ML/MIN/1.73M2 — LOW
EOSINOPHIL # BLD AUTO: 0.01 K/UL — SIGNIFICANT CHANGE UP (ref 0–0.5)
EOSINOPHIL NFR BLD AUTO: 0.1 % — SIGNIFICANT CHANGE UP (ref 0–6)
FLUAV AG NPH QL: SIGNIFICANT CHANGE UP
FLUBV AG NPH QL: SIGNIFICANT CHANGE UP
GLUCOSE BLDC GLUCOMTR-MCNC: 227 MG/DL — HIGH (ref 70–99)
GLUCOSE BLDC GLUCOMTR-MCNC: 277 MG/DL — HIGH (ref 70–99)
GLUCOSE BLDC GLUCOMTR-MCNC: 306 MG/DL — HIGH (ref 70–99)
GLUCOSE BLDC GLUCOMTR-MCNC: 315 MG/DL — HIGH (ref 70–99)
GLUCOSE SERPL-MCNC: 251 MG/DL — HIGH (ref 70–99)
GLUCOSE SERPL-MCNC: 307 MG/DL — HIGH (ref 70–99)
GLUCOSE UR QL: NEGATIVE MG/DL — SIGNIFICANT CHANGE UP
HCT VFR BLD CALC: 26.2 % — LOW (ref 34.5–45)
HCT VFR BLD CALC: 28.9 % — LOW (ref 34.5–45)
HGB BLD-MCNC: 8.3 G/DL — LOW (ref 11.5–15.5)
HGB BLD-MCNC: 9.3 G/DL — LOW (ref 11.5–15.5)
IMM GRANULOCYTES NFR BLD AUTO: 0.4 % — SIGNIFICANT CHANGE UP (ref 0–0.9)
INR BLD: 1.17 RATIO — HIGH (ref 0.85–1.16)
KETONES UR-MCNC: 40 MG/DL
LACTATE SERPL-SCNC: 1 MMOL/L — SIGNIFICANT CHANGE UP (ref 0.7–2)
LEUKOCYTE ESTERASE UR-ACNC: ABNORMAL
LYMPHOCYTES # BLD AUTO: 1.23 K/UL — SIGNIFICANT CHANGE UP (ref 1–3.3)
LYMPHOCYTES # BLD AUTO: 7.5 % — LOW (ref 13–44)
MAGNESIUM SERPL-MCNC: 1.4 MG/DL — LOW (ref 1.6–2.6)
MCHC RBC-ENTMCNC: 25.9 PG — LOW (ref 27–34)
MCHC RBC-ENTMCNC: 26.1 PG — LOW (ref 27–34)
MCHC RBC-ENTMCNC: 31.7 G/DL — LOW (ref 32–36)
MCHC RBC-ENTMCNC: 32.2 G/DL — SIGNIFICANT CHANGE UP (ref 32–36)
MCV RBC AUTO: 81.9 FL — SIGNIFICANT CHANGE UP (ref 80–100)
MONOCYTES # BLD AUTO: 1.54 K/UL — HIGH (ref 0–0.9)
MONOCYTES NFR BLD AUTO: 9.4 % — SIGNIFICANT CHANGE UP (ref 2–14)
NEUTROPHILS # BLD AUTO: 13.46 K/UL — HIGH (ref 1.8–7.4)
NEUTROPHILS NFR BLD AUTO: 82.5 % — HIGH (ref 43–77)
NITRITE UR-MCNC: POSITIVE
NRBC BLD AUTO-RTO: 0 /100 WBCS — SIGNIFICANT CHANGE UP (ref 0–0)
NRBC BLD AUTO-RTO: 0 /100 WBCS — SIGNIFICANT CHANGE UP (ref 0–0)
PH UR: 5.5 — SIGNIFICANT CHANGE UP (ref 5–8)
PHOSPHATE SERPL-MCNC: 3 MG/DL — SIGNIFICANT CHANGE UP (ref 2.5–4.5)
PLATELET # BLD AUTO: 283 K/UL — SIGNIFICANT CHANGE UP (ref 150–400)
PLATELET # BLD AUTO: 295 K/UL — SIGNIFICANT CHANGE UP (ref 150–400)
POTASSIUM SERPL-MCNC: 3.1 MMOL/L — LOW (ref 3.5–5.3)
POTASSIUM SERPL-MCNC: 3.3 MMOL/L — LOW (ref 3.5–5.3)
POTASSIUM SERPL-SCNC: 3.1 MMOL/L — LOW (ref 3.5–5.3)
POTASSIUM SERPL-SCNC: 3.3 MMOL/L — LOW (ref 3.5–5.3)
PROT SERPL-MCNC: 6.1 G/DL — SIGNIFICANT CHANGE UP (ref 6–8.3)
PROT SERPL-MCNC: 6.8 G/DL — SIGNIFICANT CHANGE UP (ref 6–8.3)
PROT UR-MCNC: 100 MG/DL
PROTHROM AB SERPL-ACNC: 13.6 SEC — HIGH (ref 9.9–13.4)
RBC # BLD: 3.2 M/UL — LOW (ref 3.8–5.2)
RBC # BLD: 3.57 M/UL — LOW (ref 3.8–5.2)
RBC # FLD: 15.1 % — HIGH (ref 10.3–14.5)
RBC # FLD: 15.5 % — HIGH (ref 10.3–14.5)
RSV RNA NPH QL NAA+NON-PROBE: SIGNIFICANT CHANGE UP
SARS-COV-2 RNA SPEC QL NAA+PROBE: SIGNIFICANT CHANGE UP
SODIUM SERPL-SCNC: 137 MMOL/L — SIGNIFICANT CHANGE UP (ref 135–145)
SODIUM SERPL-SCNC: 138 MMOL/L — SIGNIFICANT CHANGE UP (ref 135–145)
SP GR SPEC: 1.02 — SIGNIFICANT CHANGE UP (ref 1–1.03)
UROBILINOGEN FLD QL: 1 MG/DL — SIGNIFICANT CHANGE UP (ref 0.2–1)
WBC # BLD: 15.59 K/UL — HIGH (ref 3.8–10.5)
WBC # BLD: 16.33 K/UL — HIGH (ref 3.8–10.5)
WBC # FLD AUTO: 15.59 K/UL — HIGH (ref 3.8–10.5)
WBC # FLD AUTO: 16.33 K/UL — HIGH (ref 3.8–10.5)

## 2025-03-16 RX ORDER — INSULIN LISPRO 100 U/ML
2 INJECTION, SOLUTION INTRAVENOUS; SUBCUTANEOUS
Refills: 0 | Status: DISCONTINUED | OUTPATIENT
Start: 2025-03-16 | End: 2025-03-17

## 2025-03-16 RX ORDER — INSULIN LISPRO 100 U/ML
INJECTION, SOLUTION INTRAVENOUS; SUBCUTANEOUS
Refills: 0 | Status: DISCONTINUED | OUTPATIENT
Start: 2025-03-16 | End: 2025-03-20

## 2025-03-16 RX ORDER — AMLODIPINE BESYLATE 10 MG/1
10 TABLET ORAL DAILY
Refills: 0 | Status: DISCONTINUED | OUTPATIENT
Start: 2025-03-16 | End: 2025-03-20

## 2025-03-16 RX ORDER — ACETAMINOPHEN 500 MG/5ML
650 LIQUID (ML) ORAL EVERY 6 HOURS
Refills: 0 | Status: DISCONTINUED | OUTPATIENT
Start: 2025-03-16 | End: 2025-03-20

## 2025-03-16 RX ORDER — CEFTRIAXONE 500 MG/1
1000 INJECTION, POWDER, FOR SOLUTION INTRAMUSCULAR; INTRAVENOUS EVERY 24 HOURS
Refills: 0 | Status: COMPLETED | OUTPATIENT
Start: 2025-03-16 | End: 2025-03-18

## 2025-03-16 RX ORDER — SODIUM CHLORIDE 9 G/1000ML
1000 INJECTION, SOLUTION INTRAVENOUS
Refills: 0 | Status: DISCONTINUED | OUTPATIENT
Start: 2025-03-16 | End: 2025-03-17

## 2025-03-16 RX ORDER — INSULIN GLARGINE-YFGN 100 [IU]/ML
5 INJECTION, SOLUTION SUBCUTANEOUS AT BEDTIME
Refills: 0 | Status: DISCONTINUED | OUTPATIENT
Start: 2025-03-16 | End: 2025-03-17

## 2025-03-16 RX ORDER — LEVETIRACETAM 10 MG/ML
250 INJECTION, SOLUTION INTRAVENOUS
Refills: 0 | Status: DISCONTINUED | OUTPATIENT
Start: 2025-03-16 | End: 2025-03-20

## 2025-03-16 RX ORDER — INSULIN LISPRO 100 U/ML
INJECTION, SOLUTION INTRAVENOUS; SUBCUTANEOUS AT BEDTIME
Refills: 0 | Status: DISCONTINUED | OUTPATIENT
Start: 2025-03-16 | End: 2025-03-20

## 2025-03-16 RX ORDER — ATORVASTATIN CALCIUM 80 MG/1
10 TABLET, FILM COATED ORAL AT BEDTIME
Refills: 0 | Status: DISCONTINUED | OUTPATIENT
Start: 2025-03-16 | End: 2025-03-20

## 2025-03-16 RX ORDER — SODIUM CHLORIDE 9 G/1000ML
1000 INJECTION, SOLUTION INTRAVENOUS
Refills: 0 | Status: DISCONTINUED | OUTPATIENT
Start: 2025-03-16 | End: 2025-03-16

## 2025-03-16 RX ORDER — INFLUENZA A VIRUS A/IDAHO/07/2018 (H1N1) ANTIGEN (MDCK CELL DERIVED, PROPIOLACTONE INACTIVATED, INFLUENZA A VIRUS A/INDIANA/08/2018 (H3N2) ANTIGEN (MDCK CELL DERIVED, PROPIOLACTONE INACTIVATED), INFLUENZA B VIRUS B/SINGAPORE/INFTT-16-0610/2016 ANTIGEN (MDCK CELL DERIVED, PROPIOLACTONE INACTIVATED), INFLUENZA B VIRUS B/IOWA/06/2017 ANTIGEN (MDCK CELL DERIVED, PROPIOLACTONE INACTIVATED) 15; 15; 15; 15 UG/.5ML; UG/.5ML; UG/.5ML; UG/.5ML
0.5 INJECTION, SUSPENSION INTRAMUSCULAR ONCE
Refills: 0 | Status: DISCONTINUED | OUTPATIENT
Start: 2025-03-16 | End: 2025-03-20

## 2025-03-16 RX ADMIN — Medication 100 MILLIEQUIVALENT(S): at 18:53

## 2025-03-16 RX ADMIN — AMLODIPINE BESYLATE 10 MILLIGRAM(S): 10 TABLET ORAL at 06:03

## 2025-03-16 RX ADMIN — Medication 2000 MILLILITER(S): at 00:29

## 2025-03-16 RX ADMIN — SODIUM CHLORIDE 80 MILLILITER(S): 9 INJECTION, SOLUTION INTRAVENOUS at 06:31

## 2025-03-16 RX ADMIN — INSULIN LISPRO 2: 100 INJECTION, SOLUTION INTRAVENOUS; SUBCUTANEOUS at 22:52

## 2025-03-16 RX ADMIN — Medication 2000 MILLILITER(S): at 01:29

## 2025-03-16 RX ADMIN — SODIUM CHLORIDE 80 MILLILITER(S): 9 INJECTION, SOLUTION INTRAVENOUS at 17:03

## 2025-03-16 RX ADMIN — INSULIN LISPRO 2 UNIT(S): 100 INJECTION, SOLUTION INTRAVENOUS; SUBCUTANEOUS at 11:51

## 2025-03-16 RX ADMIN — Medication 50 MILLIGRAM(S): at 06:03

## 2025-03-16 RX ADMIN — LEVETIRACETAM 250 MILLIGRAM(S): 10 INJECTION, SOLUTION INTRAVENOUS at 06:03

## 2025-03-16 RX ADMIN — LEVETIRACETAM 250 MILLIGRAM(S): 10 INJECTION, SOLUTION INTRAVENOUS at 17:03

## 2025-03-16 RX ADMIN — INSULIN LISPRO 3: 100 INJECTION, SOLUTION INTRAVENOUS; SUBCUTANEOUS at 16:49

## 2025-03-16 RX ADMIN — Medication 50 MILLIGRAM(S): at 17:03

## 2025-03-16 RX ADMIN — INSULIN LISPRO 4: 100 INJECTION, SOLUTION INTRAVENOUS; SUBCUTANEOUS at 08:01

## 2025-03-16 RX ADMIN — INSULIN LISPRO 2 UNIT(S): 100 INJECTION, SOLUTION INTRAVENOUS; SUBCUTANEOUS at 16:48

## 2025-03-16 RX ADMIN — INSULIN GLARGINE-YFGN 5 UNIT(S): 100 INJECTION, SOLUTION SUBCUTANEOUS at 22:52

## 2025-03-16 RX ADMIN — INSULIN LISPRO 2: 100 INJECTION, SOLUTION INTRAVENOUS; SUBCUTANEOUS at 11:52

## 2025-03-16 RX ADMIN — INSULIN LISPRO 2 UNIT(S): 100 INJECTION, SOLUTION INTRAVENOUS; SUBCUTANEOUS at 08:01

## 2025-03-16 RX ADMIN — Medication 100 MILLIEQUIVALENT(S): at 20:02

## 2025-03-16 RX ADMIN — ATORVASTATIN CALCIUM 10 MILLIGRAM(S): 80 TABLET, FILM COATED ORAL at 22:51

## 2025-03-16 RX ADMIN — Medication 100 MILLIEQUIVALENT(S): at 17:51

## 2025-03-16 RX ADMIN — Medication 400 MILLIGRAM(S): at 00:28

## 2025-03-16 RX ADMIN — CEFTRIAXONE 100 MILLIGRAM(S): 500 INJECTION, POWDER, FOR SOLUTION INTRAMUSCULAR; INTRAVENOUS at 00:28

## 2025-03-16 RX ADMIN — Medication 40 MILLIEQUIVALENT(S): at 04:37

## 2025-03-16 NOTE — H&P ADULT - PROBLEM SELECTOR PLAN 9
DVT: lovenox hx of chronic anemia  hgb 9.3  unknown baseline  monitor cbc. hx of HLD on home atorvastatin 10.

## 2025-03-16 NOTE — H&P ADULT - PROBLEM SELECTOR PLAN 6
hx of HTN on Amlodipine 10mg qD and Hydralazine 50mg qD  c/w home meds with parameters. Hx of hyperthyroidism on home methimazole 5mg qD  c/w home meds. hx of seizures  c/w home Keppra

## 2025-03-16 NOTE — H&P ADULT - PROBLEM SELECTOR PLAN 3
hx of DM on home Januvia 50mg, lantus and novolog   hold home Januvia  started on Lantus 5u, Ademelog 2u premeal, and BUCK  monitor sugars and adjust accordingly. hx of meningioma discovered on last admission  f/u CTH to rule out worsening tumor as a reason for AMS

## 2025-03-16 NOTE — ED PROVIDER NOTE - CLINICAL SUMMARY MEDICAL DECISION MAKING FREE TEXT BOX
89-year-old female presents for altered mental status.  PE as above.    Sepsis workup given the patient's borderline febrile, fluids, antibiotics, likely admission.

## 2025-03-16 NOTE — H&P ADULT - PROBLEM SELECTOR PLAN 4
hx of seizures  c/w home Keppra hx of DM on home Januvia 50mg, lantus and novolog   hold home Januvia  started on Lantus 5u, Ademelog 2u premeal, and BUCK  monitor sugars and adjust accordingly. see above

## 2025-03-16 NOTE — PATIENT PROFILE ADULT - NS PRO AD NO ADVANCE DIRECTIVE
Patient may use for a meal replacement or a snack.  Patient was also encouraged to stop liquid calories.  We talked about fluid choices that would be okay.  We also spent a lot of time talking about carbohydrates.  Patient was encouraged to start cutting their carbohydrates out and keep them less than 75 grams per day.  Patient was given examples of carbohydrates that are in food.  We also talked about the power of protein and the importance of getting more protein in per day than carbohydrates. I have reviewed with patient meal and snack ideas that focus on protein first.    I also reviewed with patient the vitamins that they will need to take post op.  Patient will hear this information again at pre op class prior to surgery, but I felt it was important to prepare them now.  Patient will be taking 2 multivitamin complete per day, 100 mg of Vitamin B1, 5000 IU of Vitamin D3, 1000 mcg Vitamin B12, 1500 mg of calcium citrate.    We also spent some time talking about the post op diet protocol.  Patient is aware they will be on a liquid diet before surgery and then a week of liquids after surgery followed by 5 weeks of soft protein.      Patient's diet habits are: Patient is eating 3 meals per day.  Patient is encouraged to focus on protein first, followed by vegetables, and finally complex carbohydrates.   We talked about guidelines for carbohydrates now and proper portions sizes and patient was encouraged to use a smaller plate.     Patient is currently eating carbohydrates few times a week*.  Patient was encouraged to keep total carbohydrates less than 75 grams per day and aim for 60-80 grams of protein per day.  I have talked to patient about avoiding white carbohydrates and always eating a carbohydrate with a protein source.   Patient is snacking on chips.  This is  being done 3-7 times a week.  We talked in class about protein based snacks or lower sugar fruit options. Patient is drinking 28 ounces of water.   In 
No

## 2025-03-16 NOTE — H&P ADULT - PROBLEM SELECTOR PLAN 7
hx of HLD on home atorvastatin 10. hx of HTN on Amlodipine 10mg qD and Hydralazine 50mg qD  c/w home meds with parameters. Hx of hyperthyroidism on home methimazole 5mg qD  c/w home meds.

## 2025-03-16 NOTE — H&P ADULT - PROBLEM SELECTOR PLAN 8
hx of chronic anemia  hgb 9.3  unknown baseline  monitor cbc. hx of HLD on home atorvastatin 10. hx of HTN on Amlodipine 10mg qD and Hydralazine 50mg qD  c/w home meds with parameters.

## 2025-03-16 NOTE — ED PROVIDER NOTE - INPATIENT RESIDENT/ACP NOTIFIED
"Requested Prescriptions   Pending Prescriptions Disp Refills     valACYclovir (VALTREX) 500 MG tablet [Pharmacy Med Name: VALACYCLOVIR 500MG TABLETS] 90 tablet 0     Sig: TAKE 1 TABLET BY MOUTH DAILY       Antivirals for Herpes Protocol Passed - 7/23/2019 10:18 PM        Passed - Patient is age 12 or older        Passed - Recent (12 mo) or future (30 days) visit within the authorizing provider's specialty     Patient had office visit in the last 12 months or has a visit in the next 30 days with authorizing provider or within the authorizing provider's specialty.  See \"Patient Info\" tab in inbasket, or \"Choose Columns\" in Meds & Orders section of the refill encounter.              Passed - Medication is active on med list        Passed - Normal serum creatinine on file in past 12 months     Recent Labs   Lab Test 07/08/19  1520   CR 0.84             valACYclovir (VALTREX) 500 MG tablet  Last Written Prescription Date:  4/16/19  Last Fill Quantity: 90,  # refills: 0   Last office visit: 7/8/2019 with prescribing provider:  Dr. Barrow   Future Office Visit:      "
MAR

## 2025-03-16 NOTE — H&P ADULT - ASSESSMENT
89 yo F w/ hx of DM2, HTN, OA, spinal stenosis, CKD III, chronic anemia, hyperthyroidism, seizures on Keppra p/w decreased appetite, weakness, and AMS.  temp 100.4, WBC 16 . Ua+, Patient is being admitted for acute sepsis likely 2/2 UTI.

## 2025-03-16 NOTE — ED ADULT NURSE NOTE - OBJECTIVE STATEMENT
pt BIBA for AMS and decreased appetite as per EMS.  pt reports accompanied  by daughter. as per daughter pt was not taking her medications. pt daughter reports Right wrist and Left ankle swelling. pt daughter reports increased  lethargy x 2 days.

## 2025-03-16 NOTE — ED PROVIDER NOTE - PROGRESS NOTE DETAILS
Labs with elevated white blood cell count otherwise grossly unremarkable.  Pending urinalysis.  Chest x-ray appears clear. Urinalysis with UTI.  Will admit for sepsis secondary to UTI.

## 2025-03-16 NOTE — ED PROVIDER NOTE - OBJECTIVE STATEMENT
89-year-old female with a past medical history of hypertension, diabetes, CKD 3, seizure disorder, benign brain mass presents for altered mental status for the past 2 to 3 days.  As per the patient's family the patient was recently discharged from the hospital a couple of weeks ago.  States that she has been otherwise at her baseline and states they went to a couple of appointments but between 2 and 3 days ago they noticed she had worsening weakness which seem significantly worse today so brought the patient to the ED for evaluation.  Patient's not providing any history at this time.  As per the family she was having some joint pains from when they were moving her around.  Denies vomiting, diarrhea, coughing.

## 2025-03-16 NOTE — PATIENT PROFILE ADULT - FALL HARM RISK - HARM RISK INTERVENTIONS
Assistance with ambulation/Assistance OOB with selected safe patient handling equipment/Communicate Risk of Fall with Harm to all staff/Discuss with provider need for PT consult/Monitor gait and stability/Reinforce activity limits and safety measures with patient and family/Tailored Fall Risk Interventions/Visual Cue: Yellow wristband and red socks/Bed in lowest position, wheels locked, appropriate side rails in place/Call bell, personal items and telephone in reach/Instruct patient to call for assistance before getting out of bed or chair/Non-slip footwear when patient is out of bed/[place] to call system/Physically safe environment - no spills, clutter or unnecessary equipment/Purposeful Proactive Rounding/Room/bathroom lighting operational, light cord in reach

## 2025-03-16 NOTE — PATIENT PROFILE ADULT - ABILITY TO HEAR (WITH HEARING AID OR HEARING APPLIANCE IF NORMALLY USED):
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

## 2025-03-16 NOTE — H&P ADULT - PROBLEM SELECTOR PLAN 5
Hx of hyperthyroidism on home methimazole 5mg qD  c/w home meds. hx of seizures  c/w home Keppra hx of DM on home Januvia 50mg, lantus and novolog   hold home Januvia  started on Lantus 5u, Ademelog 2u premeal, and BUCK  monitor sugars and adjust accordingly.

## 2025-03-16 NOTE — H&P ADULT - NSICDXPASTMEDICALHX_GEN_ALL_CORE_FT
PAST MEDICAL HISTORY:  Anemia     Anxiety     Arthritis     Cataract     Cervical spinal stenosis     DM (diabetes mellitus)     HLD (hyperlipidemia)     HTN (hypertension)

## 2025-03-16 NOTE — ED ADULT NURSE NOTE - ISOLATION TYPE:
[facility]  Cardiovascular and Thoracic Surgery Clinic Consultation    Ernestine Morales MRN# [id number]   YOB: 1958 Age: 66 year old        Reason for consult: I was asked by Dr. Cabrera to evaluate this patient for redo mitral valve surgery.           Assessment and Plan:   Ms. 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 She underwent a mini MVR in 2017 in [place]. TTE demonstrates that she has LVOT obstruction from strut protrusion. RHC reveals severe pulmonary hypertension. She is quite frail and her BMI is extremely low at 16. Without a complete workup, at this time she would not be an ideal redo surgical candidate, but she would not be prohibitive risk either. I would like her to see Dr. Mercado as she does not have a primary cardiologist.     We will obtain a ANGE, coronary angiogram, and MICS protocol CT scan to complete the surgical workup. One consideration would be a valve-in-valve TMVR, but given the LVOT obstruction from the strut, this would likely preclude her from that consideration and she would need a redo MVR with either another bioprosthetic valve or a mechanical valve.     Once all studies have been completed and reviewed, I will discuss this case with Dr. Mercado and formulate our final recommendation and plan.    It was a pleasure to meet Ms. Morales today.        Attestation:  Beverly Haynes MD           Chief Complaint:   Fatigue, shortness of breath.             History of Present Illness:   This patient is a 66 year old female who presents to our clinic today to be evaluated for possible redo mitral valve surgery. In brief, she underwent a right min-thoracotomy mitral valve replacement in 2017 at [facility] in [place], [place] with reportedly a 29 mm Mohan Magna bovine pericardial valve (confirmation pending upon receipt of operative records). She also has a history of L sided  embolic MCA stroke in 2017 with some residual aphasia but is function and ambulates without difficulty. She recently underwent a L CEA on 9/13/24. She was admitted to [facility] subsequently and was found to have severe prosthetic mitral valve stenosis form early prosthetic valve degeneration based  on TTE. TTE also demonstrated good LV and RV function, moderate TR, and significant LVOT obstruction from the strut of the bioprosthetic mitral valve. RHC demonstrated severe pulmonary hypertension and severely elevated L sided pressures. The patient was seen by Dr. Cabrera who referred the patient to me for consideration for redo MVR. Of note, the patient is only interested in another right mini-thoracotomy approach and does not want a sternotomy.                Past Medical History:     Past Medical History:   Diagnosis Date    Anemia     Aphasia     Atrial fibrillation (H)     Cancer (H) 11.17.2022    Squamous cell carcinoma nRight Cheek    Carotid artery stenosis     Cerebrovascular accident (H) 01/09/2017    Congestive heart failure (H)     HLD (hyperlipidemia)     HTN (hypertension)     Mitral regurgitation              Past Surgical History:     Past Surgical History:   Procedure Laterality Date    BIOPSY  11.18.2022    Right Cheek    CV RIGHT HEART CATH MEASUREMENTS RECORDED N/A 9/19/2024    Procedure: Right Heart Catheterization;  Surgeon: Amilcar Pierre MD;  Location: [facility] CATH LAB CV    ENDARTERECTOMY CAROTID Left 9/13/2024    Procedure: ENDARTERECTOMY, CAROTID WITH NEURO MONITORING;  Surgeon: Margarita Tobin MD;  Location: [facility] [place] OR    PICC TRIPLE LUMEN PLACEMENT  9/14/2024    REPLACE VALVE MITRAL  06/09/2017    bovine mitral valve with Maze and ARIANNA ligation               Social History:     Social History     Tobacco Use    Smoking status: Former     Current packs/day: 0.25     Average packs/day: 0.9 packs/day for 40.7 years (34.9 ttl pk-yrs)     Types: Cigarettes     Start  date: 1/19/1984    Smokeless tobacco: Not on file    Tobacco comments:     I've cut down to just a few a day   Substance Use Topics    Alcohol use: Not Currently             Family History:     Family History   Problem Relation Age of Onset    Hypertension Mother              Immunizations:     Immunization History   Administered Date(s) Administered    COVID-19 12+ (Pfizer) 04/16/2024    COVID-19 Monovalent 18+ (Moderna) 04/10/2021    Flu, Unspecified 12/28/2018, 11/12/2019    Influenza (High Dose) Trivalent,PF (Fluzone) 09/20/2024    Influenza Vaccine 18-64 (Flublok) 09/03/2020    Influenza Vaccine >6 months,quad, PF 10/24/2022    Influenza, Split Virus, Trivalent, Pf (Fluzone\Fluarix) 12/28/2018, 11/12/2019    Influenza, seasonal, injectable, PF 12/28/2018, 11/12/2019    Pneumo Conj 13-V (2010&after) 04/26/2022    Pneumococcal 20 valent Conjugate (Prevnar 20) 04/16/2024    TDAP (Adacel,Boostrix) 07/01/2017    Zoster recombinant adjuvanted (SHINGRIX) 11/12/2019             Allergies:   No Known Allergies          Medications:     Current Outpatient Medications   Medication Sig Dispense Refill    aspirin (ASA) 325 MG EC tablet Take 325 mg by mouth daily      atorvastatin (LIPITOR) 80 MG tablet Take 1 tablet (80 mg) by mouth daily. 90 tablet 3    gabapentin (NEURONTIN) 300 MG capsule Take 1 capsule (300 mg) by mouth 2 times daily 180 capsule 1    torsemide (DEMADEX) 20 MG tablet Take 1 tablet (20 mg) by mouth daily. 60 tablet 1     No current facility-administered medications for this visit.             Review of Systems:   The 10 point Review of Systems is negative other than noted in the HPI         Physical Exam:   Vitals were reviewed                     General: appears well but somewhat frail, extremely thin, but in no acute distress  CV: RRR, normal S1 and S2, no M/G/R  Resp: CTAB  Abd: soft, ND  Ext: no edema  Neuro: no focal deficits           Data:     Lab Results   Component Value Date    WBC 7.6  2024    HGB 8.4 (L) 2024    HCT 25.5 (L) 2024     2024     2024    POTASSIUM 3.9 2024    POTASSIUM 3.9 2024    CHLORIDE 98 2024    CO2 27 2024    BUN 26.5 (H) 2024    CR 0.79 2024    GLC 94 2024    AST 45 2024    ALT 45 2024    ALKPHOS 74 2024    BILITOTAL 0.6 2024       TTE 24:    Echocardiogram Complete  Order: [id number]  Status: Edited Result - FINAL       Visible to patient: Yes (seen)       Next appt: 10/14/2024 at 09:15 AM in Neurology (Андрей Burden MD)    0 Result Notes  Details    Reading Physician Reading Date Result Priority   Jigar Walden MD  [phone] 2024      Narrative & Impression  [id number]  [id number]  [id number]  [id number]^FLORIN^[place] [address]     Name: MHASA GARVIN  MRN: [id number]  : 1958  Study Date: 2024 01:22 PM  Age: 66 yrs  Gender: Female  Patient Location: [facility]  Reason For Study: CHF, Pulmonary Hypertension  Ordering Physician: JAIR CATHERINE  Performed By: CARY     BSA: 1.3 m2  Height: 61 in  Weight: 87 lb  HR: 105  BP: 111/65 mmHg  ______________________________________________________________________________  Procedure  Complete Echo Adult.  ______________________________________________________________________________  Interpretation Summary     Left ventricular function is normal.The ejection fraction is 60-65%.  The right ventricle is mildly dilated.  The right ventricular systolic function is normal.  The left atrium is severely dilated.  The right atrium is moderately dilated.  There is a bioprosthetic mitral valve.  Prosthetic valve leaflets are thickened with decreased mobility.  Struts of the prosthetic mitral valve protrude into LVOT and make contact with  the basal septum causing LVOT obstruction.  Severe prosthetic mitral valve stenosis( mean gradient 20)  There is  moderate (2+) tricuspid regurgitation.  Severe (>55mmHg) pulmonary hypertension is present.  There is mild trileaflet aortic sclerosis.  There is mild to moderate (1-2+) aortic regurgitation.  ______________________________________________________________________________  Left Ventricle  The left ventricle is normal in size. Left ventricular function is normal.The  ejection fraction is 60-65%. There is normal left ventricular wall thickness.  Diastolic function not assessed due to presence of prosthetic mitral valve.  Flattened septum is consistent with RV pressure/volume overload.     Right Ventricle  The right ventricle is mildly dilated. The right ventricular systolic function  is normal.     Atria  The left atrium is severely dilated. The right atrium is moderately dilated.  There is no color Doppler evidence of an atrial shunt.     Mitral Valve  There is a bioprosthetic mitral valve. Severe prosthetic mitral valve  stenosis. Prosthetic valve leaflets are thickened with decreased mobility.  Struts of the prosthetic mitral valve protrude into LVOT and make contact with  the basal septum causing LVOT obstruction.     Tricuspid Valve  Tricuspid valve fails to coapt. There is moderate (2+) tricuspid  regurgitation. The right ventricular systolic pressure is approximated at 73.2  mmHg plus the right atrial pressure. Severe (>55mmHg) pulmonary hypertension  is present.     Aortic Valve  There is mild trileaflet aortic sclerosis. There is mild to moderate (1-2+)  aortic regurgitation.     Pulmonic Valve  The pulmonic valve is not well visualized.     Vessels  IVC diameter and respiratory changes fall into an intermediate range  suggesting an RA pressure of 8 mmHg.     Pericardium  There is no pericardial effusion.     ______________________________________________________________________________  MMode/2D Measurements & Calculations  IVSd: 0.99 cm  LVIDd: 2.7 cm  LVIDs: 1.7 cm  LVPWd: 1.8 cm  FS: 36.5 %     LV  mass(C)d: 119.6 grams  LV mass(C)dI: 90.2 grams/m2  Ao root diam: 2.6 cm  asc Aorta Diam: 3.0 cm  LVOT diam: 1.5 cm  LVOT area: 1.9 cm2  Ao root diam index Ht(cm/m): 1.7  Ao root diam index BSA (cm/m2): 2.0  Asc Ao diam index BSA (cm/m2): 2.3  Asc Ao diam index Ht(cm/m): 1.9  EF Biplane: 67.1 %  LA Volume Indexed (AL/bp): 73.4 ml/m2     RV Base: 3.2 cm  RWT: 1.3     Time Measurements  MM HR: 118.0 BPM     Doppler Measurements & Calculations  MV max P.1 mmHg  MV mean P.6 mmHg  MV V2 VTI: 94.5 cm  MVA(VTI): 0.55 cm2  Ao V2 max: 208.8 cm/sec  Ao max P.0 mmHg  Ao V2 mean: 161.9 cm/sec  Ao mean P.6 mmHg  Ao V2 VTI: 43.9 cm  CHALO(I,D): 1.2 cm2  CHALO(V,D): 1.6 cm2  AI P1/2t: 503.9 msec  LV V1 max P.4 mmHg  LV V1 max: 176.2 cm/sec  LV V1 VTI: 27.6 cm  SV(LVOT): 51.8 ml  SI(LVOT): 39.1 ml/m2  PA V2 max: 122.0 cm/sec  PA max P.1 mmHg  PA acc time: 0.07 sec  TR max hugo: 427.7 cm/sec  TR max P.2 mmHg  AV Hugo Ratio (DI): 0.84  CHALO Index (cm2/m2): 0.89     Peak E' Hugo: 5.2 cm/sec  RV S Hugo: 11.5 cm/sec     ______________________________________________________________________________  Report approved by: Isela Gerber 2024 03:17 PM       TTE 24:  Echocardiogram Complete  Order: [id number]  Status: Edited Result - FINAL       Visible to patient: Yes (seen)       Next appt: 10/14/2024 at 09:15 AM in Neurology (Андрей Burden MD)    0 Result Notes  Details    Reading Physician Reading Date Result Priority   Jeanne Dexter MD  [phone] 2024      Narrative & Impression  [id number]  [id number]  [id number]  [id number]^JOVANNI^SIGIFREDO^[place] [address]     Name: MAHSA GARVIN  MRN: [id number]  : 1958  Study Date: 2024 09:20 AM  Age: 66 yrs  Gender: Female  Patient Location: [facility]  Reason For Study: Other, Please Specify in Comments  Ordering Physician: SIGIFREDO BAIG  Performed By: BP     BSA: 1.4 m2  Height:  61 in  Weight: 91 lb  HR: 91  BP: 102/56 mmHg  ______________________________________________________________________________  Procedure  Complete Portable Echo Adult. Definity (NDC #54803-891) given intravenously.  Technically difficult study.Extremely difficult acoustic windows despite the  use of contrast for endcardial border definition. Compared to the prior study  dated 4/10/24, there are changes as noted.  ______________________________________________________________________________  Interpretation Summary     The left ventricle is normal in size.  Left ventricular function is normal.The ejection fraction is 60-65%.  The right ventricle is mildly dilated. Mildly decreased right ventricular  systolic function  The left atrium is severely dilated. The right atrium is moderately dilated.  There is severe (4+) tricuspid regurgitation.  Right ventricular systolic pressure is elevated, consistent with severe  pulmonary hypertension.  The aortic valve is trileaflet with aortic valve sclerosis.  There is moderate (2+) aortic regurgitation.  There is a bioprosthetic mitral valve. Bioprosthetic mitral leaflets are not  well visualized. Mean gradient elevated at 17 mmHg suggesting prosthetic  stenosis however this is at an elevated heart rate of 91 mmHg on pressor  support. There is mild (1+) prosthetic mitral valve regurgitation.  Compared to the prior study dated 4/10/24, there are changes as noted.  Increased pulmonary hypertension and increased mean gradient across the  bioprosthetic mitral valve. Prosthesis is not well visualized and appears  thickened. Consider ANGE for further evaluation.  ______________________________________________________________________________  Left Ventricle  The left ventricle is normal in size. Left ventricular function is normal.The  ejection fraction is 60-65%. There is normal left ventricular wall thickness.  Diastolic function not assessed due to presence of prosthetic mitral valve.  No  regional wall motion abnormalities noted.     Right Ventricle  The right ventricle is mildly dilated. Mildly decreased right ventricular  systolic function.     Atria  The left atrium is severely dilated. The right atrium is moderately dilated.  There is no color Doppler evidence of an atrial shunt.     Mitral Valve  There is mild (1+) prosthetic mitral valve regurgitation. There is a  bioprosthetic mitral valve. Bioprosthetic mitral leaflets are not well  visualized. Mean gradient elevated at 17 mmHg suggesting prosthetic stenosis  however this is at an elevated heart rate of 91 mmHg on pressor support.     Tricuspid Valve  Tricuspid valve leaflets appear normal. There is severe (4+) tricuspid  regurgitation. Right ventricular systolic pressure is elevated, consistent  with severe pulmonary hypertension.     Aortic Valve  The aortic valve is trileaflet with aortic valve sclerosis. There is moderate  (2+) aortic regurgitation.     Pulmonic Valve  The pulmonic valve is not well visualized. There is mild (1+) pulmonic  valvular regurgitation.     Vessels  The aorta root is normal. Normal size ascending aorta.     Pericardium  There is no pericardial effusion.     ______________________________________________________________________________  MMode/2D Measurements & Calculations  IVSd: 0.78 cm  LVIDd: 3.1 cm  LVIDs: 2.5 cm  LVPWd: 0.64 cm  FS: 19.3 %     LV mass(C)d: 53.3 grams  LV mass(C)dI: 39.4 grams/m2  Ao root diam: 3.0 cm  LA dimension: 4.2 cm  LA/Ao: 1.4  LVOT diam: 1.4 cm  LVOT area: 1.5 cm2  Ao root diam index Ht(cm/m): 1.9  Ao root diam index BSA (cm/m2): 2.2  EF Biplane: 60.8 %  RWT: 0.41  TAPSE: 1.3 cm     Time Measurements  Aortic HR: 76.0 BPM  MM HR: 91.0 BPM     Doppler Measurements & Calculations  MV max P.6 mmHg  MV mean P.0 mmHg  MV V2 VTI: 87.8 cm  MVA(VTI): 0.54 cm2  MV P1/2t max gio: 284.0 cm/sec  MV P1/2t: 167.7 msec  MVA(P1/2t): 1.3 cm2  MV dec slope: 496.0 cm/sec2     Ao V2 max:  157.0 cm/sec  Ao max PG: 10.0 mmHg  Ao V2 mean: 112.0 cm/sec  Ao mean P.0 mmHg  Ao V2 VTI: 32.4 cm  CHALO(I,D): 1.5 cm2  CHALO(V,D): 1.5 cm2  AI P1/2t: 203.1 msec  LV V1 max P.5 mmHg  LV V1 max: 154.0 cm/sec  LV V1 VTI: 31.0 cm  CO(LVOT): 3.6 l/min  CI(LVOT): 2.7 l/min/m2  SV(LVOT): 47.7 ml  SI(LVOT): 35.3 ml/m2  PA acc time: 0.06 sec  PI end-d hugo: 203.0 cm/sec  TR max hugo: 417.0 cm/sec  TR max P.6 mmHg  AV Hugo Ratio (DI): 0.98  CHALO Index (cm2/m2): 1.1  Peak E' Hugo: 4.7 cm/sec  RV S Hugo: 9.0 cm/sec     ______________________________________________________________________________  Report approved by: Isela Lyons 2024 04:25 PM       [facility] 24:    Conclusion    CARDIAC CATHETERIZATION AND INTERVENTION REPORT     PATIENT NAME:  Ernestine Morales          MEDICAL RECORD NUMBER: [id number]  : 1958                  PROCEDURE DATE: 2024           CONCLUSIONS:   Normal right and severely elevated left-sided filling pressures.  Severe pulmonary hypertension, primarily postcapillary  Normal cardiac output and index.        RECOMMENDATIONS:   Usual postprocedure cares, please see orders.  Defer further management to inpatient cardiology service.     PROCEDURE PERFORMED:   Right heart catheterization     PRE-OP DIAGNOSIS:  Bioprosthetic mitral valve stenosis     POST-OP DIAGNOSIS:   Bioprosthetic mitral valve stenosis     PROCEDURALISTS: Amilcar Pierre MD        DIAGNOSTIC PROCEDURAL DETAILS:   Signed, informed consent was obtained. The patient was placed on the table and prepped and draped in the usual fashion. Time out and site verification performed.   Access: Right brachial vein using ultrasound guidance and micropuncture technique.  Catheters: [place]  Hemostasis: Manual compression     PROCEDURAL MEDICATIONS:  Conscious sedation - none  Local anesthesia - 1% lidocaine   Procedural anticoagulation - none     CONTRAST VOLUME: none  BLOOD LOSS: minimal   FLUIDS: None    SPECIMENS: None  COMPLICATIONS: None     FINDINGS:     Right Heart Catheterization         RA: 2 mmHg         RV: 65/0/3 mmHg (s/d/ed)         PA: 62/26/40 mmHg (s/d/m)         PCWP: 25 mmHg          CO 3 L/min (Taye)  CI:  2.26 L/min/m2                       Please do not hesitate to contact me if you have any questions or concerns. I was present for the procedure in its entirety.   Amilcar Pierre MD  Interventional Cardiology             Pressures Phase: Baseline     Time Systolic (mmHg) Diastolic (mmHg) Mean (mmHg) A Wave (mmHg) V Wave (mmHg) EDP (mmHg) Max dp/dt (mmHg/sec) HR (bpm) Content (mL/dL) SAT (%)   RA Pressures  2:55 PM   1    5    2      99        RV Pressures  2:39 PM       672           2:54 PM 65        3     73        PA Pressures  2:54 PM 62    26    40        94        PCW Pressures  2:53 PM   21    25    26      100          Blood Flow Results Phase: Baseline     Time Results Indexed Values (L/min/m2)   QP  2:39 PM 3 L/min    2.26      QS  2:39 PM 3 L/min    2.26        Blood Oximetry Phase: Baseline     Time Hb SAT(%) PO2 Content (mL/dL) PA Sat (%)   PA  2:39 PM  42.6 %      42.6      Art  2:39 PM  91 %     10.4         Cardiac Output Phase: Baseline     Time TDCO (L/min) TDCI (L/min/m2) Taye C.O. (L/min) Taye C.I. (L/min/m2) Taye HR (bpm)   Cardiac Output Results  2:39 PM   3    2.26         Resistance Results Phase: Baseline     Time PVR SVR TPR TVR PVR/SVR TPR/TVR   Resistance Results (Metric)  2:39 .5 dsc-5     1066.31 dsc-5         Resistance Results (Wood)  2:39 PM 6.33 LITTLE     13.33 LITTLE           Stoke Volume Results Phase: Baseline     Time RVSW (gm*m) LVSW (gm*m) RVSW-I (gm*m/m2) LVSW-I (gm*m/m2)   Stroke Work Results  2:39 PM 16.93     12.76                          None

## 2025-03-16 NOTE — H&P ADULT - HISTORY OF PRESENT ILLNESS
89 yo F , AAOx3, ambulates with a walker, from Home,  w/ hx of DM2, HTN, OA, spinal stenosis, CKD III, chronic anemia, hyperthyroidism, seizures on Keppra p/w decreased appetite, weakness, and altered mental status.  AMS. Patients daughter by bedside reports patient has not eaten well in the past three days and has been very confused and lethargic . Denies any fall or recent infection.     To note: Patient was admitted on 3/1 for ambulatory dysfunction.  A HHA was in the process of being set up after admission.     ED course:  Vitals: temp 100.4, HR 34, /57, RR 16  spo2 94  WBC 16 , Potassium 3.3   Ua+, rvp neg   s/p CTX, 2L of Nacl

## 2025-03-16 NOTE — H&P ADULT - PROBLEM SELECTOR PLAN 1
p/w fever, elevated white count   Vitals: temp 100.4, HR 34, /57, RR 16  spo2 94  WBC 16 , Potassium 3.3   Ua+, rvp neg   s/p CTX, 2L of Nacl   likely in setting of UTI  f/u blood cultures, urine cultures  c/w iv fluids   tylenol prn

## 2025-03-16 NOTE — H&P ADULT - NSHPPHYSICALEXAM_GEN_ALL_CORE
T(C): 36.7 (03-16-25 @ 02:49), Max: 38 (03-16-25 @ 00:17)  HR: 76 (03-16-25 @ 02:49) (72 - 89)  BP: 124/58 (03-16-25 @ 02:49) (117/69 - 161/72)  RR: 19 (03-16-25 @ 02:49) (16 - 22)  SpO2: 98% (03-16-25 @ 02:49) (94% - 99%)    CONSTITUTIONAL: Well groomed, no apparent distress  EYES: PERRLA and symmetric, EOMI, No conjunctival or scleral injection, non-icteric  RESP: No respiratory distress, no use of accessory muscles; CTA b/l, no WRR  CV: RRR, +S1S2, no MRG; no JVD; no peripheral edema  GI: Soft, NT, ND, no rebound, no guarding; no palpable masses; no hepatosplenomegaly; no hernia palpated  SKIN: No rashes or ulcers noted; no subcutaneous nodules or induration palpable  PSYCH: Appropriate insight/judgment; A+O x 1,

## 2025-03-16 NOTE — ED ADULT NURSE NOTE - NSFALLRISKINTERV_ED_ALL_ED

## 2025-03-17 LAB
ALBUMIN SERPL ELPH-MCNC: 2 G/DL — LOW (ref 3.5–5)
ALP SERPL-CCNC: 67 U/L — SIGNIFICANT CHANGE UP (ref 40–120)
ALT FLD-CCNC: 12 U/L DA — SIGNIFICANT CHANGE UP (ref 10–60)
ANION GAP SERPL CALC-SCNC: 8 MMOL/L — SIGNIFICANT CHANGE UP (ref 5–17)
AST SERPL-CCNC: 16 U/L — SIGNIFICANT CHANGE UP (ref 10–40)
BILIRUB SERPL-MCNC: 0.4 MG/DL — SIGNIFICANT CHANGE UP (ref 0.2–1.2)
BUN SERPL-MCNC: 17 MG/DL — SIGNIFICANT CHANGE UP (ref 7–18)
CALCIUM SERPL-MCNC: 8.6 MG/DL — SIGNIFICANT CHANGE UP (ref 8.4–10.5)
CHLORIDE SERPL-SCNC: 106 MMOL/L — SIGNIFICANT CHANGE UP (ref 96–108)
CO2 SERPL-SCNC: 23 MMOL/L — SIGNIFICANT CHANGE UP (ref 22–31)
CREAT SERPL-MCNC: 0.84 MG/DL — SIGNIFICANT CHANGE UP (ref 0.5–1.3)
EGFR: 64 ML/MIN/1.73M2 — SIGNIFICANT CHANGE UP
GLUCOSE BLDC GLUCOMTR-MCNC: 195 MG/DL — HIGH (ref 70–99)
GLUCOSE BLDC GLUCOMTR-MCNC: 239 MG/DL — HIGH (ref 70–99)
GLUCOSE BLDC GLUCOMTR-MCNC: 268 MG/DL — HIGH (ref 70–99)
GLUCOSE BLDC GLUCOMTR-MCNC: 299 MG/DL — HIGH (ref 70–99)
GLUCOSE SERPL-MCNC: 252 MG/DL — HIGH (ref 70–99)
HCT VFR BLD CALC: 24.8 % — LOW (ref 34.5–45)
HGB BLD-MCNC: 7.9 G/DL — LOW (ref 11.5–15.5)
MAGNESIUM SERPL-MCNC: 1.6 MG/DL — SIGNIFICANT CHANGE UP (ref 1.6–2.6)
MCHC RBC-ENTMCNC: 31.9 G/DL — LOW (ref 32–36)
MCV RBC AUTO: 81 FL — SIGNIFICANT CHANGE UP (ref 80–100)
NRBC BLD AUTO-RTO: 0 /100 WBCS — SIGNIFICANT CHANGE UP (ref 0–0)
PHOSPHATE SERPL-MCNC: 2.1 MG/DL — LOW (ref 2.5–4.5)
PLATELET # BLD AUTO: 271 K/UL — SIGNIFICANT CHANGE UP (ref 150–400)
POTASSIUM SERPL-MCNC: 3.5 MMOL/L — SIGNIFICANT CHANGE UP (ref 3.5–5.3)
POTASSIUM SERPL-SCNC: 3.5 MMOL/L — SIGNIFICANT CHANGE UP (ref 3.5–5.3)
PROT SERPL-MCNC: 5.8 G/DL — LOW (ref 6–8.3)
RBC # BLD: 3.06 M/UL — LOW (ref 3.8–5.2)
RBC # FLD: 15.4 % — HIGH (ref 10.3–14.5)
SODIUM SERPL-SCNC: 137 MMOL/L — SIGNIFICANT CHANGE UP (ref 135–145)
WBC # BLD: 10.86 K/UL — HIGH (ref 3.8–10.5)
WBC # FLD AUTO: 10.86 K/UL — HIGH (ref 3.8–10.5)

## 2025-03-17 RX ORDER — SOD PHOS DI, MONO/K PHOS MONO 250 MG
2 TABLET ORAL EVERY 6 HOURS
Refills: 0 | Status: COMPLETED | OUTPATIENT
Start: 2025-03-17 | End: 2025-03-18

## 2025-03-17 RX ORDER — DEXAMETHASONE 0.5 MG/1
4 TABLET ORAL ONCE
Refills: 0 | Status: COMPLETED | OUTPATIENT
Start: 2025-03-17 | End: 2025-03-17

## 2025-03-17 RX ORDER — SODIUM CHLORIDE 9 G/1000ML
1000 INJECTION, SOLUTION INTRAVENOUS
Refills: 0 | Status: DISCONTINUED | OUTPATIENT
Start: 2025-03-17 | End: 2025-03-20

## 2025-03-17 RX ORDER — INSULIN GLARGINE-YFGN 100 [IU]/ML
10 INJECTION, SOLUTION SUBCUTANEOUS AT BEDTIME
Refills: 0 | Status: DISCONTINUED | OUTPATIENT
Start: 2025-03-17 | End: 2025-03-18

## 2025-03-17 RX ORDER — INSULIN LISPRO 100 U/ML
5 INJECTION, SOLUTION INTRAVENOUS; SUBCUTANEOUS
Refills: 0 | Status: DISCONTINUED | OUTPATIENT
Start: 2025-03-17 | End: 2025-03-18

## 2025-03-17 RX ADMIN — Medication 2 TABLET(S): at 11:34

## 2025-03-17 RX ADMIN — INSULIN LISPRO 1: 100 INJECTION, SOLUTION INTRAVENOUS; SUBCUTANEOUS at 11:34

## 2025-03-17 RX ADMIN — SODIUM CHLORIDE 50 MILLILITER(S): 9 INJECTION, SOLUTION INTRAVENOUS at 08:12

## 2025-03-17 RX ADMIN — DEXAMETHASONE 4 MILLIGRAM(S): 0.5 TABLET ORAL at 12:02

## 2025-03-17 RX ADMIN — INSULIN LISPRO 2: 100 INJECTION, SOLUTION INTRAVENOUS; SUBCUTANEOUS at 17:01

## 2025-03-17 RX ADMIN — INSULIN LISPRO 3: 100 INJECTION, SOLUTION INTRAVENOUS; SUBCUTANEOUS at 08:13

## 2025-03-17 RX ADMIN — Medication 50 MILLIGRAM(S): at 17:03

## 2025-03-17 RX ADMIN — ATORVASTATIN CALCIUM 10 MILLIGRAM(S): 80 TABLET, FILM COATED ORAL at 21:58

## 2025-03-17 RX ADMIN — CEFTRIAXONE 100 MILLIGRAM(S): 500 INJECTION, POWDER, FOR SOLUTION INTRAMUSCULAR; INTRAVENOUS at 05:11

## 2025-03-17 RX ADMIN — Medication 50 MILLIGRAM(S): at 06:11

## 2025-03-17 RX ADMIN — INSULIN LISPRO 2 UNIT(S): 100 INJECTION, SOLUTION INTRAVENOUS; SUBCUTANEOUS at 08:12

## 2025-03-17 RX ADMIN — INSULIN LISPRO 1: 100 INJECTION, SOLUTION INTRAVENOUS; SUBCUTANEOUS at 21:54

## 2025-03-17 RX ADMIN — INSULIN LISPRO 2 UNIT(S): 100 INJECTION, SOLUTION INTRAVENOUS; SUBCUTANEOUS at 11:34

## 2025-03-17 RX ADMIN — AMLODIPINE BESYLATE 10 MILLIGRAM(S): 10 TABLET ORAL at 06:11

## 2025-03-17 RX ADMIN — INSULIN GLARGINE-YFGN 10 UNIT(S): 100 INJECTION, SOLUTION SUBCUTANEOUS at 21:54

## 2025-03-17 RX ADMIN — Medication 2 TABLET(S): at 17:02

## 2025-03-17 RX ADMIN — LEVETIRACETAM 250 MILLIGRAM(S): 10 INJECTION, SOLUTION INTRAVENOUS at 06:11

## 2025-03-17 RX ADMIN — Medication 2 TABLET(S): at 10:18

## 2025-03-17 RX ADMIN — INSULIN LISPRO 5 UNIT(S): 100 INJECTION, SOLUTION INTRAVENOUS; SUBCUTANEOUS at 17:02

## 2025-03-17 RX ADMIN — LEVETIRACETAM 250 MILLIGRAM(S): 10 INJECTION, SOLUTION INTRAVENOUS at 17:02

## 2025-03-17 NOTE — PROGRESS NOTE ADULT - SUBJECTIVE AND OBJECTIVE BOX
NP Note discussed with  Primary Attending    Patient is a 89y old  Female who presents with a chief complaint of UT I /AMS (16 Mar 2025 04:04)      INTERVAL HPI/OVERNIGHT EVENTS: no new complaints    MEDICATIONS  (STANDING):  amLODIPine   Tablet 10 milliGRAM(s) Oral daily  atorvastatin 10 milliGRAM(s) Oral at bedtime  cefTRIAXone   IVPB 1000 milliGRAM(s) IV Intermittent every 24 hours  hydrALAZINE 50 milliGRAM(s) Oral two times a day  influenza  Vaccine (HIGH DOSE) 0.5 milliLiter(s) IntraMuscular once  insulin glargine Injectable (LANTUS) 10 Unit(s) SubCutaneous at bedtime  insulin lispro (ADMELOG) corrective regimen sliding scale   SubCutaneous three times a day before meals  insulin lispro (ADMELOG) corrective regimen sliding scale   SubCutaneous at bedtime  insulin lispro Injectable (ADMELOG) 5 Unit(s) SubCutaneous three times a day before meals  lactated ringers. 1000 milliLiter(s) (50 mL/Hr) IV Continuous <Continuous>  levETIRAcetam 250 milliGRAM(s) Oral two times a day  methimazole 5 milliGRAM(s) Oral daily  potassium phosphate / sodium phosphate Tablet (K-PHOS No. 2) 2 Tablet(s) Oral every 6 hours    MEDICATIONS  (PRN):  acetaminophen     Tablet .. 650 milliGRAM(s) Oral every 6 hours PRN Temp greater or equal to 38C (100.4F), Mild Pain (1 - 3)      __________________________________________________  REVIEW OF SYSTEMS:    CONSTITUTIONAL: No fever,   EYES: no acute visual disturbances  NECK: No pain or stiffness  RESPIRATORY: No cough; No shortness of breath  CARDIOVASCULAR: No chest pain, no palpitations  GASTROINTESTINAL: No pain. No nausea or vomiting; No diarrhea   NEUROLOGICAL: No headache or numbness, no tremors  MUSCULOSKELETAL: No joint pain, no muscle pain  GENITOURINARY: no dysuria, no frequency, no hesitancy  PSYCHIATRY: no depression , no anxiety  ALL OTHER  ROS negative        Vital Signs Last 24 Hrs  T(C): 36.3 (17 Mar 2025 05:15), Max: 37 (16 Mar 2025 21:00)  T(F): 97.3 (17 Mar 2025 05:15), Max: 98.6 (16 Mar 2025 21:00)  HR: 71 (17 Mar 2025 05:15) (71 - 77)  BP: 149/66 (17 Mar 2025 05:15) (110/52 - 149/66)  BP(mean): 83 (16 Mar 2025 21:00) (83 - 83)  RR: 18 (17 Mar 2025 05:15) (18 - 19)  SpO2: 95% (17 Mar 2025 05:15) (95% - 96%)    Parameters below as of 17 Mar 2025 05:15  Patient On (Oxygen Delivery Method): room air        ________________________________________________  PHYSICAL EXAM:  GENERAL: NAD  HEENT: Normocephalic;  conjunctivae and sclerae clear; moist mucous membranes;   NECK : supple  CHEST/LUNG: Clear to auscultation bilaterally with good air entry   HEART: S1 S2  regular; no murmurs, gallops or rubs  ABDOMEN: Soft, Nontender, Nondistended; Bowel sounds present  EXTREMITIES: no cyanosis; no edema; no calf tenderness  SKIN: warm and dry; no rash  NERVOUS SYSTEM:  Awake and alert; Oriented  to place, person and time ; no new deficits    _________________________________________________  LABS:                        7.9    10.86 )-----------( 271      ( 17 Mar 2025 05:45 )             24.8     03-17    137  |  106  |  17  ----------------------------<  252[H]  3.5   |  23  |  0.84    Ca    8.6      17 Mar 2025 05:45  Phos  2.1     03-17  Mg     1.6     03-17    TPro  5.8[L]  /  Alb  2.0[L]  /  TBili  0.4  /  DBili  x   /  AST  16  /  ALT  12  /  AlkPhos  67  03-17    PT/INR - ( 16 Mar 2025 00:20 )   PT: 13.6 sec;   INR: 1.17 ratio         PTT - ( 16 Mar 2025 00:20 )  PTT:32.5 sec  Urinalysis Basic - ( 17 Mar 2025 05:45 )    Color: x / Appearance: x / SG: x / pH: x  Gluc: 252 mg/dL / Ketone: x  / Bili: x / Urobili: x   Blood: x / Protein: x / Nitrite: x   Leuk Esterase: x / RBC: x / WBC x   Sq Epi: x / Non Sq Epi: x / Bacteria: x      CAPILLARY BLOOD GLUCOSE      POCT Blood Glucose.: 195 mg/dL (17 Mar 2025 11:25)  POCT Blood Glucose.: 299 mg/dL (17 Mar 2025 08:03)  POCT Blood Glucose.: 306 mg/dL (16 Mar 2025 22:45)  POCT Blood Glucose.: 277 mg/dL (16 Mar 2025 16:41)        RADIOLOGY & ADDITIONAL TESTS:    Imaging Personally Reviewed:  YES/NO    Consultant(s) Notes Reviewed:   YES/ No    Care Discussed with Consultants :     Plan of care was discussed with patient and /or primary care giver; all questions and concerns were addressed and care was aligned with patient's wishes.

## 2025-03-17 NOTE — PROGRESS NOTE ADULT - NS ATTEND AMEND GEN_ALL_CORE FT
I have seen and evaluated the patient at the bedside. She is more awake today, preparing to eat lunch. She is also speaking, saying she feels weak but otherwise no symptoms. No pain or dysuria. Her daughter and granddaughter at bedside as well, provided with update.     On exam, she is sitting up in bed, awake, alert, no distress. She is oriented only to name. She is afebrile, vitals stable with AM AM /66. She has impaired right hand  strength, worse than the left side. Speech slightly slurred, unknown chronicity.     I have reviewed her CBC, BMP. WBC improving, down to 10.8 from 15. Glucose 195-306. Na, K, Cr normal.     Assessment and Plan:    89 year old woman with medical hx including DM2, HTN, CKD3, hyperthyroidism, seizures on Keppra with chronic meningioma noted with vasogenic edema and mild left shift with recent admission for  ambulatory dysfunction and  acute encephalopathy. She had multispecialty care before discharge and after follow up with Neurology, plans for follow up with oncology to determine utility of steroid therapy.    She comes in today on account of acute changes in mental status with lethargy and confusion. There is associated FTT with anorexia.  Based on recent admission   Acute encephalopathy could be related to   - worsening vasogenic edema related to chronic meningioma  - UTI with sepsis    Plan   Admit to Medicine   Sepsis work up   CT head to evaluate vasogenic edema; consider steroid use if CT head shows worsening of vasogenic edema. (CT Head ordered, pending  Empiric IV antibiotics for UTI - ceftriaxone 1g daily   gentle IVF hydration   replace K  med reconciliation   resume home meds   Other plans as above. I have seen and evaluated the patient at the bedside. She is more awake today, preparing to eat lunch. She is also speaking, saying she feels weak but otherwise no symptoms. No pain or dysuria. Her daughter and granddaughter at bedside as well, provided with update.     On exam, she is sitting up in bed, awake, alert, no distress. She is oriented only to name. She is afebrile, vitals stable with AM AM /66. She has impaired right hand  strength, worse than the left side. Speech slightly slurred, unknown chronicity.     I have reviewed her CBC, BMP. WBC improving, down to 10.8 from 15. Glucose 195-306. Na, K, Cr normal.     Assessment and Plan:    89 year old woman with medical hx including DM2, HTN, CKD3, hyperthyroidism, seizures on Keppra with chronic meningioma noted with vasogenic edema and mild left shift with recent admission for  ambulatory dysfunction and  acute encephalopathy. She had multispecialty care before discharge and after follow up with Neurology, plans for follow up with oncology to determine utility of steroid therapy.    She comes in today on account of acute changes in mental status with lethargy and confusion. There is associated FTT with anorexia.  Based on recent admission   Acute encephalopathy could be related to worsening vasogenic edema related to chronic meningioma +/- UTI with sepsis  I feel that both are related: patient is a high risk substrate for encephalopathy given her infection because of her vasogenic edema.     #Sepsis 2/2 UTI  #Leukocytosis, improving  #DM c/b severe hyperglycemia   #Chronic Meningioma with vasogenic edema in the frontal lobe  #CKD  #Seizure Disorder on Keppra     Plan     -Sepsis work up (blood and urine cultures pending)  -plan for 3 day course of ceftriaxone, follow urine culture results and susceptibilities   -CT Head completed: size of meningioma is unchanged (prior MRN is [id number])  -when she was scanned earlier in March, the vasogenic edema associated with the meningioma was new  -gentle IVF hydration   -replace K  -re hyperglycemia: increase insulin glargine to 10 units at night, add 5 units TID AC + sliding scale  -check A1c%  -NSG consultation: if steroids are not recommended/family does not desire steroids given adverse effects, can consider Palliative care consultation

## 2025-03-18 DIAGNOSIS — Z75.8 OTHER PROBLEMS RELATED TO MEDICAL FACILITIES AND OTHER HEALTH CARE: ICD-10-CM

## 2025-03-18 LAB
-  AMOXICILLIN/CLAVULANIC ACID: SIGNIFICANT CHANGE UP
-  AMPICILLIN/SULBACTAM: SIGNIFICANT CHANGE UP
-  AMPICILLIN: SIGNIFICANT CHANGE UP
-  AZTREONAM: SIGNIFICANT CHANGE UP
-  CEFAZOLIN: SIGNIFICANT CHANGE UP
-  CEFEPIME: SIGNIFICANT CHANGE UP
-  CEFOXITIN: SIGNIFICANT CHANGE UP
-  CEFTRIAXONE: SIGNIFICANT CHANGE UP
-  CEFUROXIME: SIGNIFICANT CHANGE UP
-  CIPROFLOXACIN: SIGNIFICANT CHANGE UP
-  ERTAPENEM: SIGNIFICANT CHANGE UP
-  GENTAMICIN: SIGNIFICANT CHANGE UP
-  IMIPENEM: SIGNIFICANT CHANGE UP
-  LEVOFLOXACIN: SIGNIFICANT CHANGE UP
-  MEROPENEM: SIGNIFICANT CHANGE UP
-  NITROFURANTOIN: SIGNIFICANT CHANGE UP
-  PIPERACILLIN/TAZOBACTAM: SIGNIFICANT CHANGE UP
-  TOBRAMYCIN: SIGNIFICANT CHANGE UP
-  TRIMETHOPRIM/SULFAMETHOXAZOLE: SIGNIFICANT CHANGE UP
ANION GAP SERPL CALC-SCNC: 9 MMOL/L — SIGNIFICANT CHANGE UP (ref 5–17)
BUN SERPL-MCNC: 17 MG/DL — SIGNIFICANT CHANGE UP (ref 7–18)
CALCIUM SERPL-MCNC: 8.2 MG/DL — LOW (ref 8.4–10.5)
CHLORIDE SERPL-SCNC: 103 MMOL/L — SIGNIFICANT CHANGE UP (ref 96–108)
CO2 SERPL-SCNC: 22 MMOL/L — SIGNIFICANT CHANGE UP (ref 22–31)
CREAT SERPL-MCNC: 0.83 MG/DL — SIGNIFICANT CHANGE UP (ref 0.5–1.3)
CULTURE RESULTS: ABNORMAL
EGFR: 65 ML/MIN/1.73M2 — SIGNIFICANT CHANGE UP
EGFR: 65 ML/MIN/1.73M2 — SIGNIFICANT CHANGE UP
GLUCOSE BLDC GLUCOMTR-MCNC: 186 MG/DL — HIGH (ref 70–99)
GLUCOSE BLDC GLUCOMTR-MCNC: 251 MG/DL — HIGH (ref 70–99)
GLUCOSE BLDC GLUCOMTR-MCNC: 279 MG/DL — HIGH (ref 70–99)
GLUCOSE BLDC GLUCOMTR-MCNC: 314 MG/DL — HIGH (ref 70–99)
GLUCOSE SERPL-MCNC: 304 MG/DL — HIGH (ref 70–99)
HGB BLD-MCNC: 8.2 G/DL — LOW (ref 11.5–15.5)
MAGNESIUM SERPL-MCNC: 1.4 MG/DL — LOW (ref 1.6–2.6)
MCHC RBC-ENTMCNC: 26.5 PG — LOW (ref 27–34)
MCHC RBC-ENTMCNC: 33.2 G/DL — SIGNIFICANT CHANGE UP (ref 32–36)
MCV RBC AUTO: 79.7 FL — LOW (ref 80–100)
METHOD TYPE: SIGNIFICANT CHANGE UP
NRBC BLD AUTO-RTO: 0 /100 WBCS — SIGNIFICANT CHANGE UP (ref 0–0)
ORGANISM # SPEC MICROSCOPIC CNT: ABNORMAL
ORGANISM # SPEC MICROSCOPIC CNT: ABNORMAL
PHOSPHATE SERPL-MCNC: 3.8 MG/DL — SIGNIFICANT CHANGE UP (ref 2.5–4.5)
PLATELET # BLD AUTO: 303 K/UL — SIGNIFICANT CHANGE UP (ref 150–400)
POTASSIUM SERPL-MCNC: 3.6 MMOL/L — SIGNIFICANT CHANGE UP (ref 3.5–5.3)
POTASSIUM SERPL-SCNC: 3.6 MMOL/L — SIGNIFICANT CHANGE UP (ref 3.5–5.3)
RBC # BLD: 3.1 M/UL — LOW (ref 3.8–5.2)
RBC # FLD: 15 % — HIGH (ref 10.3–14.5)
SODIUM SERPL-SCNC: 134 MMOL/L — LOW (ref 135–145)
SPECIMEN SOURCE: SIGNIFICANT CHANGE UP
WBC # BLD: 8.55 K/UL — SIGNIFICANT CHANGE UP (ref 3.8–10.5)
WBC # FLD AUTO: 8.55 K/UL — SIGNIFICANT CHANGE UP (ref 3.8–10.5)

## 2025-03-18 RX ORDER — INSULIN LISPRO 100 U/ML
8 INJECTION, SOLUTION INTRAVENOUS; SUBCUTANEOUS
Refills: 0 | Status: DISCONTINUED | OUTPATIENT
Start: 2025-03-18 | End: 2025-03-19

## 2025-03-18 RX ORDER — INSULIN GLARGINE-YFGN 100 [IU]/ML
12 INJECTION, SOLUTION SUBCUTANEOUS AT BEDTIME
Refills: 0 | Status: DISCONTINUED | OUTPATIENT
Start: 2025-03-18 | End: 2025-03-20

## 2025-03-18 RX ADMIN — INSULIN LISPRO 3: 100 INJECTION, SOLUTION INTRAVENOUS; SUBCUTANEOUS at 17:16

## 2025-03-18 RX ADMIN — LEVETIRACETAM 250 MILLIGRAM(S): 10 INJECTION, SOLUTION INTRAVENOUS at 17:15

## 2025-03-18 RX ADMIN — INSULIN LISPRO 5 UNIT(S): 100 INJECTION, SOLUTION INTRAVENOUS; SUBCUTANEOUS at 07:50

## 2025-03-18 RX ADMIN — Medication 50 MILLIGRAM(S): at 17:15

## 2025-03-18 RX ADMIN — LEVETIRACETAM 250 MILLIGRAM(S): 10 INJECTION, SOLUTION INTRAVENOUS at 06:12

## 2025-03-18 RX ADMIN — CEFTRIAXONE 100 MILLIGRAM(S): 500 INJECTION, POWDER, FOR SOLUTION INTRAMUSCULAR; INTRAVENOUS at 04:59

## 2025-03-18 RX ADMIN — AMLODIPINE BESYLATE 10 MILLIGRAM(S): 10 TABLET ORAL at 06:12

## 2025-03-18 RX ADMIN — Medication 2 TABLET(S): at 00:25

## 2025-03-18 RX ADMIN — ATORVASTATIN CALCIUM 10 MILLIGRAM(S): 80 TABLET, FILM COATED ORAL at 22:08

## 2025-03-18 RX ADMIN — INSULIN LISPRO 3: 100 INJECTION, SOLUTION INTRAVENOUS; SUBCUTANEOUS at 11:56

## 2025-03-18 RX ADMIN — Medication 2 TABLET(S): at 06:12

## 2025-03-18 RX ADMIN — INSULIN LISPRO 5 UNIT(S): 100 INJECTION, SOLUTION INTRAVENOUS; SUBCUTANEOUS at 11:56

## 2025-03-18 RX ADMIN — INSULIN LISPRO 4: 100 INJECTION, SOLUTION INTRAVENOUS; SUBCUTANEOUS at 07:50

## 2025-03-18 RX ADMIN — INSULIN LISPRO 8 UNIT(S): 100 INJECTION, SOLUTION INTRAVENOUS; SUBCUTANEOUS at 17:16

## 2025-03-18 RX ADMIN — INSULIN GLARGINE-YFGN 12 UNIT(S): 100 INJECTION, SOLUTION SUBCUTANEOUS at 22:07

## 2025-03-18 RX ADMIN — Medication 50 MILLIGRAM(S): at 06:12

## 2025-03-18 RX ADMIN — SODIUM CHLORIDE 50 MILLILITER(S): 9 INJECTION, SOLUTION INTRAVENOUS at 00:25

## 2025-03-18 NOTE — DIETITIAN INITIAL EVALUATION ADULT - NS FNS DIET ORDER
Diet, Pureed:   Consistent Carbohydrate {No Snacks}  DASH/TLC {Sodium & Cholesterol Restricted} (03-16-25 @ 12:18) [Active]

## 2025-03-18 NOTE — PHYSICAL THERAPY INITIAL EVALUATION ADULT - NSPTDISCHREC_GEN_A_CORE
Assessment/Plan:    No problem-specific Assessment & Plan notes found for this encounter  Diagnoses and all orders for this visit:    Physical exam, annual  Physical exam performed today  See below for further details  Screening for lipid disorders  -     Lipid Panel with Direct LDL reflex; Future  Patient will need a lipid panel as part of his wellness program through [facility]  Order was placed for that today  Screening for diabetes mellitus  -     Hemoglobin A1C; Future  Patient will need an A1c as part of his wellness program through [facility]  [facility]\"" care  He is a [employer] 

## 2025-03-18 NOTE — PHYSICAL THERAPY INITIAL EVALUATION ADULT - DIAGNOSIS, PT EVAL
Patient presents with generalized weakness and altered mental status which has decreased their independence in bed mobility, transfers, and ambulation.

## 2025-03-18 NOTE — PROGRESS NOTE ADULT - PROBLEM SELECTOR PLAN 5
hx of DM on home Januvia 50mg, lantus and novolog   hold home Januvia  f/u A1C  increase lantus 12 U and c/w standing admelog, ISS  Endo Dr. Matamoros consulted

## 2025-03-18 NOTE — PHYSICAL THERAPY INITIAL EVALUATION ADULT - LEVEL OF INDEPENDENCE: SIT/SUPINE, REHAB EVAL
Unable to assess, patient left seated./unable to perform Unable to assess, patient left seated at edge of bed/unable to perform

## 2025-03-18 NOTE — DIETITIAN INITIAL EVALUATION ADULT - PROBLEM SELECTOR PLAN 5
hx of DM on home Januvia 50mg, lantus and novolog   hold home Januvia  started on Lantus 5u, Ademelog 2u premeal, and BUCK  monitor sugars and adjust accordingly.

## 2025-03-18 NOTE — PROGRESS NOTE ADULT - NS ATTEND AMEND GEN_ALL_CORE FT
#MALU 2/2 UTI   #Meningioma   #Vasogenic brain edema   #Midline shift   #Gait instability   #Type 2 DM   #Hyperthyroid   #HTN  #HLD  #CKD3   #AOCD  #Spinal stenosis   #DVT ppx     89yF with PMH of type 2 DM, OA, spinal stenosis, CKD III, chronic anemia, hyperthyroidism, seizures on Keppra presents with ambulatory dysfunction and SOB x1 day. Admitted for MALU 2/2 UTI. Patient recently admitted from 03/01-03/06 for ambulatory dysfunction. Patient seen at bedside with daughter present. Per daughter, patient went home after her last admission, and was doing well initially, however, slowly began to decline, noting that she had decreased appetite and was less mobile. Reported that she was also more confused, and this became progressively worse. She states that patient had a UTI a couple years ago, and it presented the same way.     PE: as above; vitals reviewed, NAD, elderly, abdomen soft/nontender, A+Ox1-2, no le edema   Labs reviewed: CBC, BMP, Mg, Phos; monitor daily     -s/p CTX x3 days, UCx +Klebsiella   -CTH findings showing vasogenic edema, meningioma, unchanged from prior - will hold on steroids for now  -c/w home meds for chronic conditions   -ACHS FS, ISS  -c/w Lantus and Lispro, increased to Lantus 12 units and Lispro 8 units premeal   -endo Dr. Matamoros consulted for persistent hyperglycemia   -PT rec: MARYANN   -DVT ppx #MALU 2/2 UTI   #Meningioma   #Vasogenic brain edema   #Midline shift   #Gait instability   #Type 2 DM   #Hyperthyroid   #HTN  #HLD  #CKD3   #AOCD  #Spinal stenosis   #DVT ppx     89yF with PMH of type 2 DM, OA, spinal stenosis, CKD III, chronic anemia, hyperthyroidism, seizures on Keppra presents with ambulatory dysfunction and SOB x1 day. Admitted for MALU 2/2 UTI. Patient recently admitted from 03/01-03/06 for ambulatory dysfunction. Patient seen at bedside with daughter present. Per daughter, patient went home after her last admission, and was doing well initially, however, slowly began to decline, noting that she had decreased appetite and was less mobile. Reported that she was also more confused, and this became progressively worse. She states that patient had a UTI a couple years ago, and it presented the same way. Patient awake, answering questions appropriately, states that she feels better than she did when she came to the hospital.     PE: as above; vitals reviewed, NAD, elderly, abdomen soft/nontender, A+Ox1-2, no le edema   Labs reviewed: CBC, BMP, Mg, Phos; monitor daily     -s/p CTX x3 days, UCx +Klebsiella   -CTH findings showing vasogenic edema, meningioma, unchanged from prior - will hold on steroids for now  -c/w home meds for chronic conditions   -ACHS FS, ISS  -c/w Lantus and Lispro, increased to Lantus 12 units and Lispro 8 units premeal   -endo Dr. Matamoros consulted for persistent hyperglycemia   -PT rec: MARYANN   -DVT ppx

## 2025-03-18 NOTE — PHYSICAL THERAPY INITIAL EVALUATION ADULT - LEVEL OF INDEPENDENCE: STAIR NEGOTIATION, REHAB EVAL
Continue T4 tx.  Plan for TSH next visit.   Unable to assess; stoppage of session due to bowel movement./unable to perform

## 2025-03-18 NOTE — PROGRESS NOTE ADULT - SUBJECTIVE AND OBJECTIVE BOX
NP Note discussed with  Primary Attending    Patient is a 89y old  Female who presents with a chief complaint of UT I /AMS (17 Mar 2025 12:12)      INTERVAL HPI/OVERNIGHT EVENTS: no new complaints    MEDICATIONS  (STANDING):  amLODIPine   Tablet 10 milliGRAM(s) Oral daily  atorvastatin 10 milliGRAM(s) Oral at bedtime  hydrALAZINE 50 milliGRAM(s) Oral two times a day  influenza  Vaccine (HIGH DOSE) 0.5 milliLiter(s) IntraMuscular once  insulin glargine Injectable (LANTUS) 10 Unit(s) SubCutaneous at bedtime  insulin lispro (ADMELOG) corrective regimen sliding scale   SubCutaneous three times a day before meals  insulin lispro (ADMELOG) corrective regimen sliding scale   SubCutaneous at bedtime  insulin lispro Injectable (ADMELOG) 5 Unit(s) SubCutaneous three times a day before meals  lactated ringers. 1000 milliLiter(s) (50 mL/Hr) IV Continuous <Continuous>  levETIRAcetam 250 milliGRAM(s) Oral two times a day  methimazole 5 milliGRAM(s) Oral daily    MEDICATIONS  (PRN):  acetaminophen     Tablet .. 650 milliGRAM(s) Oral every 6 hours PRN Temp greater or equal to 38C (100.4F), Mild Pain (1 - 3)      __________________________________________________  REVIEW OF SYSTEMS:    CONSTITUTIONAL: No fever,   EYES: no acute visual disturbances  NECK: No pain or stiffness  RESPIRATORY: No cough; No shortness of breath  CARDIOVASCULAR: No chest pain, no palpitations  GASTROINTESTINAL: No pain. No nausea or vomiting; No diarrhea   NEUROLOGICAL: No headache or numbness, no tremors  MUSCULOSKELETAL: No joint pain, no muscle pain  GENITOURINARY: no dysuria, no frequency, no hesitancy  PSYCHIATRY: no depression , no anxiety  ALL OTHER  ROS negative        Vital Signs Last 24 Hrs  T(C): 36.3 (18 Mar 2025 05:12), Max: 36.7 (17 Mar 2025 20:37)  T(F): 97.4 (18 Mar 2025 05:12), Max: 98.1 (17 Mar 2025 20:37)  HR: 75 (18 Mar 2025 05:12) (73 - 80)  BP: 134/69 (18 Mar 2025 05:12) (128/69 - 148/72)  BP(mean): 91 (18 Mar 2025 05:12) (91 - 97)  RR: 18 (18 Mar 2025 05:12) (18 - 18)  SpO2: 99% (18 Mar 2025 05:12) (96% - 99%)    Parameters below as of 18 Mar 2025 05:12  Patient On (Oxygen Delivery Method): room air        ________________________________________________  PHYSICAL EXAM:  GENERAL: NAD  HEENT: Normocephalic;  conjunctivae and sclerae clear; moist mucous membranes;   NECK : supple  CHEST/LUNG: Clear to auscultation bilaterally with good air entry   HEART: S1 S2  regular; no murmurs, gallops or rubs  ABDOMEN: Soft, Nontender, Nondistended; Bowel sounds present  EXTREMITIES: no cyanosis; no edema; no calf tenderness  SKIN: warm and dry; no rash  NERVOUS SYSTEM:  Awake and alert; Oriented  to place, person and time ; no new deficits    _________________________________________________  LABS:                        8.2    8.55  )-----------( 303      ( 18 Mar 2025 05:25 )             24.7     03-18    134[L]  |  103  |  17  ----------------------------<  304[H]  3.6   |  22  |  0.83    Ca    8.2[L]      18 Mar 2025 05:25  Phos  3.8     03-18  Mg     1.4     03-18    TPro  5.8[L]  /  Alb  2.0[L]  /  TBili  0.4  /  DBili  x   /  AST  16  /  ALT  12  /  AlkPhos  67  03-17      Urinalysis Basic - ( 18 Mar 2025 05:25 )    Color: x / Appearance: x / SG: x / pH: x  Gluc: 304 mg/dL / Ketone: x  / Bili: x / Urobili: x   Blood: x / Protein: x / Nitrite: x   Leuk Esterase: x / RBC: x / WBC x   Sq Epi: x / Non Sq Epi: x / Bacteria: x      CAPILLARY BLOOD GLUCOSE      POCT Blood Glucose.: 314 mg/dL (18 Mar 2025 07:43)  POCT Blood Glucose.: 268 mg/dL (17 Mar 2025 21:04)  POCT Blood Glucose.: 239 mg/dL (17 Mar 2025 16:22)  POCT Blood Glucose.: 195 mg/dL (17 Mar 2025 11:25)        RADIOLOGY & ADDITIONAL TESTS:    Imaging Personally Reviewed:  YES/NO    Consultant(s) Notes Reviewed:   YES/ No    Care Discussed with Consultants :     Plan of care was discussed with patient and /or primary care giver; all questions and concerns were addressed and care was aligned with patient's wishes.     NP Note discussed with  Primary Attending    Patient is a 89y old  Female who presents with a chief complaint of UT I /AMS (17 Mar 2025 12:12)      INTERVAL HPI/OVERNIGHT EVENTS: no acute events overnight     MEDICATIONS  (STANDING):  amLODIPine   Tablet 10 milliGRAM(s) Oral daily  atorvastatin 10 milliGRAM(s) Oral at bedtime  hydrALAZINE 50 milliGRAM(s) Oral two times a day  influenza  Vaccine (HIGH DOSE) 0.5 milliLiter(s) IntraMuscular once  insulin glargine Injectable (LANTUS) 10 Unit(s) SubCutaneous at bedtime  insulin lispro (ADMELOG) corrective regimen sliding scale   SubCutaneous three times a day before meals  insulin lispro (ADMELOG) corrective regimen sliding scale   SubCutaneous at bedtime  insulin lispro Injectable (ADMELOG) 5 Unit(s) SubCutaneous three times a day before meals  lactated ringers. 1000 milliLiter(s) (50 mL/Hr) IV Continuous <Continuous>  levETIRAcetam 250 milliGRAM(s) Oral two times a day  methimazole 5 milliGRAM(s) Oral daily    MEDICATIONS  (PRN):  acetaminophen     Tablet .. 650 milliGRAM(s) Oral every 6 hours PRN Temp greater or equal to 38C (100.4F), Mild Pain (1 - 3)      __________________________________________________  REVIEW OF SYSTEMS:    CONSTITUTIONAL: No fever,   RESPIRATORY: No cough; No shortness of breath  CARDIOVASCULAR: No chest pain, no palpitations  GASTROINTESTINAL: No pain. No nausea or vomiting; No diarrhea   ALL OTHER  ROS negative        Vital Signs Last 24 Hrs  T(C): 36.3 (18 Mar 2025 05:12), Max: 36.7 (17 Mar 2025 20:37)  T(F): 97.4 (18 Mar 2025 05:12), Max: 98.1 (17 Mar 2025 20:37)  HR: 75 (18 Mar 2025 05:12) (73 - 80)  BP: 134/69 (18 Mar 2025 05:12) (128/69 - 148/72)  BP(mean): 91 (18 Mar 2025 05:12) (91 - 97)  RR: 18 (18 Mar 2025 05:12) (18 - 18)  SpO2: 99% (18 Mar 2025 05:12) (96% - 99%)    Parameters below as of 18 Mar 2025 05:12  Patient On (Oxygen Delivery Method): room air        ________________________________________________  PHYSICAL EXAM:  GENERAL: NAD  HEENT: Normocephalic  NECK : supple  CHEST/LUNG: Clear to auscultation bilaterally  HEART: S1 S2  regular  ABDOMEN: Soft, Nontender, Nondistended; Bowel sounds present  EXTREMITIES: no edema  SKIN: warm and dry; no rash  NERVOUS SYSTEM:  Awake and alert; Oriented  to place, person    _________________________________________________  LABS:                        8.2    8.55  )-----------( 303      ( 18 Mar 2025 05:25 )             24.7     03-18    134[L]  |  103  |  17  ----------------------------<  304[H]  3.6   |  22  |  0.83    Ca    8.2[L]      18 Mar 2025 05:25  Phos  3.8     03-18  Mg     1.4     03-18    TPro  5.8[L]  /  Alb  2.0[L]  /  TBili  0.4  /  DBili  x   /  AST  16  /  ALT  12  /  AlkPhos  67  03-17      Urinalysis Basic - ( 18 Mar 2025 05:25 )    Color: x / Appearance: x / SG: x / pH: x  Gluc: 304 mg/dL / Ketone: x  / Bili: x / Urobili: x   Blood: x / Protein: x / Nitrite: x   Leuk Esterase: x / RBC: x / WBC x   Sq Epi: x / Non Sq Epi: x / Bacteria: x      CAPILLARY BLOOD GLUCOSE      POCT Blood Glucose.: 314 mg/dL (18 Mar 2025 07:43)  POCT Blood Glucose.: 268 mg/dL (17 Mar 2025 21:04)  POCT Blood Glucose.: 239 mg/dL (17 Mar 2025 16:22)  POCT Blood Glucose.: 195 mg/dL (17 Mar 2025 11:25)        RADIOLOGY & ADDITIONAL TESTS:  < from: CT Head No Cont (03.16.25 @ 17:41) >  FINDINGS:  Slightly hyperdense anterior frontal lobe parafalcine mass measuring 3.1   x 4.1 x 4.0 cm suggesting a meningioma. Severe left frontal lobe   vasogenic edema. Moderate mass effect on the frontal horn. Consider   contrast-enhanced MRI for further evaluation.    There is periventricular and subcortical white matter hypodensity without   mass effect, nonspecific, likely representing mild chronic microvascular   ischemic changes. There is no compelling evidence for an acute   transcortical infarction. There is no extra-axial fluid collection. The   lateral ventricles and cortical sulci are age-appropriate in size and   configuration. The orbits, mastoid air cells and visualized paranasal   sinuses are unremarkable. The calvarium is intact. Consider MRI as   clinically warranted.    IMPRESSION: Slightly hyperdense anterior frontal lobe parafalcine mass   measuring 3.1 x 4.1 x 4.0 cm suggesting a meningioma. Severe left frontal   lobe vasogenic edema. Moderate mass effect on thefrontal horn. Consider   contrast-enhanced MRI for further evaluation.    --- End of Report ---    < end of copied text >      Imaging Personally Reviewed:  YES  Consultant(s) Notes Reviewed:   YES      Plan of care was discussed with patient and /or primary care giver; all questions and concerns were addressed and care was aligned with patient's wishes.

## 2025-03-18 NOTE — PHYSICAL THERAPY INITIAL EVALUATION ADULT - LIVES WITH, PROFILE
Patient likes in a 2 level house with their daughter and niece. Home has [address] and 15 stairs to reach the second floor./children

## 2025-03-18 NOTE — PHYSICAL THERAPY INITIAL EVALUATION ADULT - PERTINENT HX OF CURRENT PROBLEM, REHAB EVAL
Patient admitted to [facility] after daughter reports patient has decreased appetite over the past 3 days and has become increasingly more confused and weaker. Patient was previously admitted 3/1 for ambulatory dysfunction.     hx of DM2, HTN, OA, spinal stenosis, CKD III, chronic anemia, hyperthyroidism, seizures on Keppra

## 2025-03-18 NOTE — DIETITIAN INITIAL EVALUATION ADULT - PERTINENT MEDS FT
MEDICATIONS  (STANDING):  amLODIPine   Tablet 10 milliGRAM(s) Oral daily  atorvastatin 10 milliGRAM(s) Oral at bedtime  hydrALAZINE 50 milliGRAM(s) Oral two times a day  influenza  Vaccine (HIGH DOSE) 0.5 milliLiter(s) IntraMuscular once  insulin glargine Injectable (LANTUS) 10 Unit(s) SubCutaneous at bedtime  insulin lispro (ADMELOG) corrective regimen sliding scale   SubCutaneous three times a day before meals  insulin lispro (ADMELOG) corrective regimen sliding scale   SubCutaneous at bedtime  insulin lispro Injectable (ADMELOG) 5 Unit(s) SubCutaneous three times a day before meals  lactated ringers. 1000 milliLiter(s) (50 mL/Hr) IV Continuous <Continuous>  levETIRAcetam 250 milliGRAM(s) Oral two times a day  methimazole 5 milliGRAM(s) Oral daily    MEDICATIONS  (PRN):  acetaminophen     Tablet .. 650 milliGRAM(s) Oral every 6 hours PRN Temp greater or equal to 38C (100.4F), Mild Pain (1 - 3)

## 2025-03-18 NOTE — PHYSICAL THERAPY INITIAL EVALUATION ADULT - ACTIVE RANGE OF MOTION EXAMINATION, REHAB EVAL
Patient demonstrates 90 degrees of shoulder flexion. No pain reported/Left UE Active ROM was WNL (within normal limits)/bilateral lower extremity Active ROM was WNL (within normal limits)/deficits as listed below

## 2025-03-18 NOTE — PHYSICAL THERAPY INITIAL EVALUATION ADULT - GENERAL OBSERVATIONS, REHAB EVAL
Patient appears disoriented as they are unable to provide time and place but are oriented to person. Patient is received with their daughter at bedside providing collateral information. Patient has prima-fit in place. Patient appears disoriented as she is unable to provide time and place but oriented to person. Patient is received with her daughter at bedside providing collateral information. Patient has prima-fit in place.

## 2025-03-18 NOTE — PHYSICAL THERAPY INITIAL EVALUATION ADULT - ADDITIONAL COMMENTS
As per daughter: patient was assisted up to the second floor of the home where she would shower and sleep. Daughter and niece would walk the patient downstairs throughout day to promote mobilization. As per daughter: patient was assisted up to the second floor of the home where she would shower and sleep. Daughter and niece would walk the patient downstairs throughout day to promote mobilization. Daughter's niece is awaiting approval to become full-time HHA for patient; but daughter states the niece will continue to take care of patient in the meantime.

## 2025-03-18 NOTE — DIETITIAN INITIAL EVALUATION ADULT - ADD RECOMMEND
1) Continue current diet as medically feasible, defer diet consistency to team.  2) Encourage PO intake and honor food preferences as able.  3) Monitor PO intake, labs, weights, BM's, and skin integrity.   4) Recommend glucerna QD

## 2025-03-18 NOTE — DIETITIAN INITIAL EVALUATION ADULT - FACTORS AFF FOOD INTAKE
change in mental status/persistent lack of appetite/Tenriism/ethnic/cultural/personal food preferences

## 2025-03-18 NOTE — DIETITIAN INITIAL EVALUATION ADULT - ORAL INTAKE PTA/DIET HISTORY
Visited pt at bedside, AAOx1, noted with AMS. Daughter present, answered writers questions. Reported no new food allergies or intolerances. Pt does not take any vitamins or supplements at home, answered questions about types of supplements to provide if pt would like them, talked about glucerna and diabetic protein supplement options. Pt's intake was poor x 1 day PTA, ate regular consistency food at home. Reported UBW:145 lbs, no recent significant weight changes.  Nutrition interview: No recent episodes of nausea, vomiting, diarrhea or constipation per daughter. Last BM noted on 3/15 per RN flowsheets. Intake is 50-75% per daughter, currently tolerating pureed diet consistency. Food preferences explored and forwarded to dietary. Hyponatremia (134), Hypomagnesemia (1.4). POCT 195 - 315 between 3/16 - 3/18.

## 2025-03-18 NOTE — DIETITIAN INITIAL EVALUATION ADULT - PERTINENT LABORATORY DATA
03-18    134[L]  |  103  |  17  ----------------------------<  304[H]  3.6   |  22  |  0.83    Ca    8.2[L]      18 Mar 2025 05:25  Phos  3.8     03-18  Mg     1.4     03-18    TPro  5.8[L]  /  Alb  2.0[L]  /  TBili  0.4  /  DBili  x   /  AST  16  /  ALT  12  /  AlkPhos  67  03-17  POCT Blood Glucose.: 251 mg/dL (03-18-25 @ 11:41)

## 2025-03-18 NOTE — PROGRESS NOTE ADULT - TIME BILLING
-Review of hospital course, labs, vitals, medical records  -Bedside exam and interview  -Discussed plan and coordinated care with ACP/housestaff, family  -GOC conversation  -Documentation of encounter

## 2025-03-19 ENCOUNTER — TRANSCRIPTION ENCOUNTER (OUTPATIENT)
Age: 89
End: 2025-03-19

## 2025-03-19 DIAGNOSIS — E87.6 HYPOKALEMIA: ICD-10-CM

## 2025-03-19 LAB
A1C WITH ESTIMATED AVERAGE GLUCOSE RESULT: 8.3 % — HIGH (ref 4–5.6)
ANION GAP SERPL CALC-SCNC: 9 MMOL/L — SIGNIFICANT CHANGE UP (ref 5–17)
BUN SERPL-MCNC: 18 MG/DL — SIGNIFICANT CHANGE UP (ref 7–18)
CALCIUM SERPL-MCNC: 8.7 MG/DL — SIGNIFICANT CHANGE UP (ref 8.4–10.5)
CHLORIDE SERPL-SCNC: 106 MMOL/L — SIGNIFICANT CHANGE UP (ref 96–108)
CO2 SERPL-SCNC: 24 MMOL/L — SIGNIFICANT CHANGE UP (ref 22–31)
CREAT SERPL-MCNC: 0.82 MG/DL — SIGNIFICANT CHANGE UP (ref 0.5–1.3)
EGFR: 66 ML/MIN/1.73M2 — SIGNIFICANT CHANGE UP
EGFR: 66 ML/MIN/1.73M2 — SIGNIFICANT CHANGE UP
ESTIMATED AVERAGE GLUCOSE: 192 MG/DL — HIGH (ref 68–114)
GLUCOSE BLDC GLUCOMTR-MCNC: 107 MG/DL — HIGH (ref 70–99)
GLUCOSE BLDC GLUCOMTR-MCNC: 154 MG/DL — HIGH (ref 70–99)
GLUCOSE BLDC GLUCOMTR-MCNC: 177 MG/DL — HIGH (ref 70–99)
GLUCOSE BLDC GLUCOMTR-MCNC: 57 MG/DL — LOW (ref 70–99)
GLUCOSE BLDC GLUCOMTR-MCNC: 86 MG/DL — SIGNIFICANT CHANGE UP (ref 70–99)
GLUCOSE SERPL-MCNC: 160 MG/DL — HIGH (ref 70–99)
HCT VFR BLD CALC: 27.1 % — LOW (ref 34.5–45)
HGB BLD-MCNC: 8.8 G/DL — LOW (ref 11.5–15.5)
MCHC RBC-ENTMCNC: 25.9 PG — LOW (ref 27–34)
MCHC RBC-ENTMCNC: 32.5 G/DL — SIGNIFICANT CHANGE UP (ref 32–36)
MCV RBC AUTO: 79.7 FL — LOW (ref 80–100)
NRBC BLD AUTO-RTO: 0 /100 WBCS — SIGNIFICANT CHANGE UP (ref 0–0)
PLATELET # BLD AUTO: 331 K/UL — SIGNIFICANT CHANGE UP (ref 150–400)
POTASSIUM SERPL-MCNC: 3.1 MMOL/L — LOW (ref 3.5–5.3)
RBC # BLD: 3.4 M/UL — LOW (ref 3.8–5.2)
RBC # FLD: 15.1 % — HIGH (ref 10.3–14.5)
SODIUM SERPL-SCNC: 139 MMOL/L — SIGNIFICANT CHANGE UP (ref 135–145)
WBC # BLD: 8.22 K/UL — SIGNIFICANT CHANGE UP (ref 3.8–10.5)
WBC # FLD AUTO: 8.22 K/UL — SIGNIFICANT CHANGE UP (ref 3.8–10.5)

## 2025-03-19 RX ADMIN — LEVETIRACETAM 250 MILLIGRAM(S): 10 INJECTION, SOLUTION INTRAVENOUS at 17:08

## 2025-03-19 RX ADMIN — Medication 50 MILLIGRAM(S): at 05:47

## 2025-03-19 RX ADMIN — Medication 40 MILLIEQUIVALENT(S): at 08:59

## 2025-03-19 RX ADMIN — LEVETIRACETAM 250 MILLIGRAM(S): 10 INJECTION, SOLUTION INTRAVENOUS at 05:47

## 2025-03-19 RX ADMIN — INSULIN LISPRO 1: 100 INJECTION, SOLUTION INTRAVENOUS; SUBCUTANEOUS at 07:55

## 2025-03-19 RX ADMIN — Medication 50 MILLIGRAM(S): at 17:08

## 2025-03-19 RX ADMIN — INSULIN GLARGINE-YFGN 12 UNIT(S): 100 INJECTION, SOLUTION SUBCUTANEOUS at 21:46

## 2025-03-19 RX ADMIN — INSULIN LISPRO 8 UNIT(S): 100 INJECTION, SOLUTION INTRAVENOUS; SUBCUTANEOUS at 11:42

## 2025-03-19 RX ADMIN — ATORVASTATIN CALCIUM 10 MILLIGRAM(S): 80 TABLET, FILM COATED ORAL at 21:46

## 2025-03-19 RX ADMIN — Medication 40 MILLIEQUIVALENT(S): at 11:43

## 2025-03-19 RX ADMIN — AMLODIPINE BESYLATE 10 MILLIGRAM(S): 10 TABLET ORAL at 05:47

## 2025-03-19 RX ADMIN — INSULIN LISPRO 8 UNIT(S): 100 INJECTION, SOLUTION INTRAVENOUS; SUBCUTANEOUS at 07:55

## 2025-03-19 NOTE — DISCHARGE NOTE PROVIDER - HOSPITAL COURSE
89 yo F , AAOx3, ambulates with a walker, from Home,  w/ hx of DM2, HTN, OA, spinal stenosis, CKD III, chronic anemia, hyperthyroidism, seizures on Keppra p/w decreased appetite, weakness, and altered mental status.     To note: Patient was admitted on 3/1 for ambulatory dysfunction; HHA was in the process of being set up after admission.     ED course: Vitals: temp 100.4, HR 34, /57, RR 16  spo2 94  WBC 16 , Potassium 3.3   Ua+, rvp neg   s/p CTX, 2L of Nacl     Patient is being admitted for acute sepsis likely 2/2 UTI.  CTH- Slightly hyperdense anterior frontal lobe parafalcine mass measuring 3.1 x 4.1 x 4.0 cm suggesting a meningioma. Severe left frontal lobe vasogenic edema. Moderate mass effect on the frontal horn. Cxr- The heart is normal in size. The lungs are clear. Prior cervical fusion surgery. Ucx grew - klb pne & aerococcus, which is sensitive to cftx; pt. completed course of abx.     Endo Dr. Matamoros consulted for persistent hyperglycemia and reccs: ????????    PT reccs: MARYANN.      incomplete 3/19---->> 89 yo F , AAOx3, ambulates with a walker, from Home,  w/ hx of DM2, HTN, OA, spinal stenosis, CKD III, chronic anemia, hyperthyroidism, seizures on Keppra p/w decreased appetite, weakness, and altered mental status.     To note: Patient was admitted on 3/1 for ambulatory dysfunction; HHA was in the process of being set up after admission.     ED course: Vitals: temp 100.4, HR 34, /57, RR 16  spo2 94  WBC 16 , Potassium 3.3   Ua+, rvp neg   s/p CTX, 2L of Nacl     Patient is being admitted for acute sepsis likely 2/2 UTI.  CTH- Slightly hyperdense anterior frontal lobe parafalcine mass measuring 3.1 x 4.1 x 4.0 cm suggesting a meningioma. Severe left frontal lobe vasogenic edema. Moderate mass effect on the frontal horn. Cxr- The heart is normal in size. The lungs are clear. Prior cervical fusion surgery. Ucx grew - klebsiella & aerococcus, which were sensitive to Ceftriaxone; pt completed course of antibiotics in the hospital.      Endo Dr. Matamoros consulted for persistent hyperglycemia and reccs: ????????    PT rec MARYANN.      incomplete 3/19---->> 89 yo F , AAOx3, ambulates with a walker, from Home,  w/ hx of DM2, HTN, OA, spinal stenosis, CKD III, chronic anemia, hyperthyroidism, seizures on Keppra p/w decreased appetite, weakness, and altered mental status.     To note: Patient was admitted on 3/1 for ambulatory dysfunction; HHA was in the process of being set up after admission.     ED course: Vitals: temp 100.4, HR 34, /57, RR 16  spo2 94  WBC 16 , Potassium 3.3   Ua+, rvp neg   s/p CTX, 2L of Nacl     Patient is being admitted for acute sepsis likely 2/2 UTI.  CTH- Slightly hyperdense anterior frontal lobe parafalcine mass measuring 3.1 x 4.1 x 4.0 cm suggesting a meningioma. Severe left frontal lobe vasogenic edema. Moderate mass effect on the frontal horn. Cxr- The heart is normal in size. The lungs are clear. Prior cervical fusion surgery. Ucx grew - klebsiella & aerococcus, which were sensitive to Ceftriaxone; pt completed course of antibiotics in the hospital.      Caitlin Matamoros consulted for persistent hyperglycemia and reccs: ????????    PT rec MARYANN. Relatives prefer to take pt home and to continue home PT.     D/C planning discussed with pt's daughter Bolivar at bedside with understanding of information discussed verbalized including currently DM regimen for home. 89 yo F , AAOx3, ambulates with a walker, from Home,  w/ hx of DM2, HTN, OA, spinal stenosis, CKD III, chronic anemia, hyperthyroidism, seizures on Keppra p/w decreased appetite, weakness, and altered mental status.     To note: Patient was admitted on 3/1 for ambulatory dysfunction; HHA was in the process of being set up after admission.     ED course: Vitals: temp 100.4, HR 34, /57, RR 16  spo2 94  WBC 16 , Potassium 3.3   Ua+, rvp neg   s/p CTX, 2L of Nacl     Patient is being admitted for acute sepsis likely 2/2 UTI.  CTH- Slightly hyperdense anterior frontal lobe parafalcine mass measuring 3.1 x 4.1 x 4.0 cm suggesting a meningioma. Severe left frontal lobe vasogenic edema. Moderate mass effect on the frontal horn. Cxr- The heart is normal in size. The lungs are clear. Prior cervical fusion surgery. Ucx grew - klebsiella & aerococcus, which were sensitive to Ceftriaxone; pt completed course of antibiotics in the hospital.      Endo Dr. Matamoros consulted for persistent hyperglycemia and adjusted insulin.    PT rec MARYANN. Relatives prefer to take pt home and to continue home PT.     D/C planning discussed with pt's daughter Bolivar at bedside with understanding of information discussed verbalized including currently DM regimen for home.

## 2025-03-19 NOTE — PROGRESS NOTE ADULT - PROBLEM SELECTOR PLAN 5
H/o DM on Januvia, Lantus and Novolog   - A1c pending-f/u results   - C/w Lantus & HSS   - Endo-Dr. Matamoros consult pending-f/u rec's H/o DM on Januvia, Lantus and Novolog   - A1c pending-f/u results   - FS 56.  DC'd premeal  - C/w Lantus & HSS   - Continue to monitor and adjust insulin accordingly   - Endo-Dr. Matamoros consult pending-f/u rec's - Likely 2/2 poor PO intake  - Supplemented   - Continue to monitor

## 2025-03-19 NOTE — CONSULT NOTE ADULT - PROBLEM SELECTOR RECOMMENDATION 9
uncontrolled with hyperglycemia  agree with lantus 12 units  admelog 8 ac tid- hold if pt not eating or poor appetite- risk of hypos  fsg ac and hs  check a1c  aim fsg 140-180

## 2025-03-19 NOTE — PROGRESS NOTE ADULT - ASSESSMENT
89 year old, F, from home w/ PMH of DM2, HTN, OA, spinal stenosis, CKD III, chronic anemia, hyperthyroidism, & seizures (on Keppra).  Presented w/ decreased appetite, weakness, and AMS.  Admitted for Acute sepsis likely 2/2 UTI.    Pt has another MRN [id number].
89 yo F w/ hx of DM2, HTN, OA, spinal stenosis, CKD III, chronic anemia, hyperthyroidism, seizures on Keppra p/w decreased appetite, weakness, and AMS.  temp 100.4, WBC 16 . Ua+, Admitted for acute sepsis likely 2/2 UTI, mental status improving. Noted with elevate blood glucose, Endo Dr. Rasmussen consulted

## 2025-03-19 NOTE — DISCHARGE NOTE PROVIDER - NSDCFUADDAPPT_GEN_ALL_CORE_FT
APPTS ARE READY TO BE MADE: [x] YES    Best Family or Patient Contact (if needed): please contact patient's daughterBolivar, [phone]    Additional Information about above appointments (if needed):    1:   2:   3:     Other comments or requests:

## 2025-03-19 NOTE — DISCHARGE NOTE PROVIDER - CARE PROVIDER_API CALL
Mirian Matamoros.  Endocrinology/Metab/Diabetes  [address] [place] [id number]  Phone: [phone]  Fax: [phone]  Follow Up Time:    Mirian Matamoros  Endocrinology/Metab/Diabetes  [address] [place] [id number]  Phone: [phone]  Fax: [phone]  Follow Up Time:     Edith Sandoval  Internal Medicine  [id number] [place] [id number]  Phone: [phone]  Fax: [phone]  Follow Up Time:

## 2025-03-19 NOTE — PROGRESS NOTE ADULT - NS ATTEND AMEND GEN_ALL_CORE FT
#MALU 2/2 UTI   #Meningioma   #Vasogenic brain edema   #Midline shift   #Gait instability   #Type 2 DM   #Hyperthyroid   #HTN  #HLD  #CKD3   #AOCD  #Spinal stenosis   #DVT ppx     89yF with PMH of type 2 DM, OA, spinal stenosis, CKD III, chronic anemia, hyperthyroidism, seizures on Keppra presents with ambulatory dysfunction and SOB x1 day. Admitted for MALU 2/2 UTI. Patient recently admitted from 03/01-03/06 for ambulatory dysfunction. Patient seen at bedside with daughter and granddaughter present. Family states that they were present since 4PM yesterday, and patient did not sleep at all. Overnight, she was having some delusions, but at time of evaluation, was awake and alert. Eating her lunch herself.     PE: as above; vitals reviewed, NAD, elderly, abdomen soft/nontender, A+Ox1-2, no le edema   Labs reviewed: CBC, BMP, Mg, Phos; monitor daily     -s/p CTX x3 days, UCx +Klebsiella   -CTH findings showing vasogenic edema, meningioma, unchanged from prior - will hold on steroids for now  -c/w home meds for chronic conditions   -ACHS FS, ISS  -c/w Lantus 12 units and Lispro 8 units before meals (if eating)   -haily Matamoros following, recommendations noted and appreciated  -PT rec: MARYANN - patient's family opting to take patient home with home therapy   -DVT ppx

## 2025-03-19 NOTE — PROGRESS NOTE ADULT - SUBJECTIVE AND OBJECTIVE BOX
NP Note discussed with  primary attending    Patient is a 89y old  Female who presents with a chief complaint of UT I /AMS (19 Mar 2025 11:08)      INTERVAL HPI/OVERNIGHT EVENTS: Pt seen w/ granddtr-Gavi at bedside.  Denies pain or new concerns.  Granddtr reports pt did not sleep last night.  LBM yesterday, described as loose.  Pt walks w/ walker at baseline.      MEDICATIONS  (STANDING):  amLODIPine   Tablet 10 milliGRAM(s) Oral daily  atorvastatin 10 milliGRAM(s) Oral at bedtime  hydrALAZINE 50 milliGRAM(s) Oral two times a day  influenza  Vaccine (HIGH DOSE) 0.5 milliLiter(s) IntraMuscular once  insulin glargine Injectable (LANTUS) 12 Unit(s) SubCutaneous at bedtime  insulin lispro (ADMELOG) corrective regimen sliding scale   SubCutaneous three times a day before meals  insulin lispro (ADMELOG) corrective regimen sliding scale   SubCutaneous at bedtime  insulin lispro Injectable (ADMELOG) 8 Unit(s) SubCutaneous three times a day before meals  lactated ringers. 1000 milliLiter(s) (50 mL/Hr) IV Continuous <Continuous>  levETIRAcetam 250 milliGRAM(s) Oral two times a day  methimazole 5 milliGRAM(s) Oral daily    MEDICATIONS  (PRN):  acetaminophen     Tablet .. 650 milliGRAM(s) Oral every 6 hours PRN Temp greater or equal to 38C (100.4F), Mild Pain (1 - 3)      __________________________________________________  REVIEW OF SYSTEMS:    CONSTITUTIONAL: No fever  EYES: No acute visual disturbances  NECK: No pain or stiffness  RESPIRATORY: No cough; No shortness of breath  CARDIOVASCULAR: No chest pain, no palpitations  GASTROINTESTINAL: No pain. No nausea or vomiting.  No diarrhea   NEUROLOGICAL: No headache or numbness, no tremors  MUSCULOSKELETAL: No joint pain, no muscle pain  GENITOURINARY: No dysuria, no frequency, no hesitancy  PSYCHIATRY: No depression , no anxiety  ALL OTHER  ROS negative        Vital Signs Last 24 Hrs  T(C): 36.4 (19 Mar 2025 12:55), Max: 36.4 (19 Mar 2025 12:55)  T(F): 97.5 (19 Mar 2025 12:55), Max: 97.5 (19 Mar 2025 12:55)  HR: 80 (19 Mar 2025 12:55) (71 - 84)  BP: 133/68 (19 Mar 2025 12:55) (133/68 - 152/72)  BP(mean): 90 (19 Mar 2025 12:55) (90 - 98)  RR: 18 (19 Mar 2025 12:55) (18 - 18)  SpO2: 98% (19 Mar 2025 12:55) (94% - 99%)    Parameters below as of 19 Mar 2025 12:55  Patient On (Oxygen Delivery Method): room air        ________________________________________________  PHYSICAL EXAM:  GENERAL: NAD  HEENT: Normocephalic;  conjunctivae and sclerae clear; moist mucous membranes   NECK : Supple  CHEST/LUNG: Clear to auscultation bilaterally with good air entry   HEART: S1 S2  regular; no murmurs, gallops or rubs  ABDOMEN: Obese, Soft, Nontender, Nondistended; Bowel sounds present x 4 quad  EXTREMITIES: No cyanosis; no edema; no calf tenderness  SKIN: Warm and dry; no rash  NERVOUS SYSTEM:  Awake and alert; Oriented to person, not place and time; no new deficits    _________________________________________________  LABS:                        8.8    8.22  )-----------( 331      ( 19 Mar 2025 05:30 )             27.1     03-19    139  |  106  |  18  ----------------------------<  160[H]  3.1[L]   |  24  |  0.82    Ca    8.7      19 Mar 2025 05:30  Phos  3.8     03-18  Mg     1.4     03-18        Urinalysis Basic - ( 19 Mar 2025 05:30 )    Color: x / Appearance: x / SG: x / pH: x  Gluc: 160 mg/dL / Ketone: x  / Bili: x / Urobili: x   Blood: x / Protein: x / Nitrite: x   Leuk Esterase: x / RBC: x / WBC x   Sq Epi: x / Non Sq Epi: x / Bacteria: x      CAPILLARY BLOOD GLUCOSE      POCT Blood Glucose.: 86 mg/dL (19 Mar 2025 11:38)  POCT Blood Glucose.: 154 mg/dL (19 Mar 2025 07:49)  POCT Blood Glucose.: 186 mg/dL (18 Mar 2025 20:54)  POCT Blood Glucose.: 279 mg/dL (18 Mar 2025 17:06)        RADIOLOGY & ADDITIONAL TESTS:    Imaging Personally Reviewed:  YES/NO    Consultant(s) Notes Reviewed:   YES/ No    Care Discussed with Consultants :     Plan of care was discussed with patient and /or primary care giver; all questions and concerns were addressed and care was aligned with patient's wishes.

## 2025-03-19 NOTE — DISCHARGE NOTE PROVIDER - ATTENDING DISCHARGE PHYSICAL EXAMINATION:
Constitutional/General: Well developed, vitals reviewed, pleasant, NAD  EYE: Symmetrical pupils, conjunctiva clear   ENT: Good dentition, oropharynx clear  NECK: No visual masses, no JVD  CHEST: No respiratory distress, bilateral symmetrical chest rise  ABDOMEN: Nondistended, no visual masses  SKIN: No rash, warm, dry  NEURO: A+Ox2-3, Cranial nerves grossly intact, moves all extremities, follows commands  PSYCH: Normal mood, normal affect    Patient seen at bedside with daughter present. She was sleeping, but easily arousable. Eager to go home. Daughter, Bolivar, is aware that she will need a post-hospitalization visit with her PCP within one week.

## 2025-03-19 NOTE — PROGRESS NOTE ADULT - PROBLEM SELECTOR PLAN 6
H/o Seizure on Keppra   - C/w Keppra H/o DM on Januvia, Lantus and Novolog   - A1c pending-f/u results   - FS 56.  DC'd premeal  - C/w Lantus & HSS   - Continue to monitor and adjust insulin accordingly   - Endo-Dr. Matamoros consult pending-f/u rec's

## 2025-03-19 NOTE — CONSULT NOTE ADULT - SUBJECTIVE AND OBJECTIVE BOX
Patient is a 89y old  Female who presents with a chief complaint of Sepsis     (18 Mar 2025 13:55)      HPI:  89 yo F , AAOx3, ambulates with a walker, from Home,  w/ hx of DM2, HTN, OA, spinal stenosis, CKD III, chronic anemia, hyperthyroidism, seizures on Keppra p/w decreased appetite, weakness, and altered mental status.  AMS. Patients daughter by bedside reports patient has not eaten well in the past three days and has been very confused and lethargic . Denies any fall or recent infection.     To note: Patient was admitted on 3/1 for ambulatory dysfunction.  A HHA was in the process of being set up after admission.     ED course:  Vitals: temp 100.4, HR 34, /57, RR 16  spo2 94  WBC 16 , Potassium 3.3   Ua+, rvp neg   s/p CTX, 2L of Nacl  (16 Mar 2025 04:04)      PAST MEDICAL & SURGICAL HISTORY:  Anemia      Anxiety      Arthritis      Cataract      Cervical spinal stenosis      DM (diabetes mellitus)      HLD (hyperlipidemia)      HTN (hypertension)             MEDICATIONS  (STANDING):  amLODIPine   Tablet 10 milliGRAM(s) Oral daily  atorvastatin 10 milliGRAM(s) Oral at bedtime  hydrALAZINE 50 milliGRAM(s) Oral two times a day  influenza  Vaccine (HIGH DOSE) 0.5 milliLiter(s) IntraMuscular once  insulin glargine Injectable (LANTUS) 12 Unit(s) SubCutaneous at bedtime  insulin lispro (ADMELOG) corrective regimen sliding scale   SubCutaneous three times a day before meals  insulin lispro (ADMELOG) corrective regimen sliding scale   SubCutaneous at bedtime  insulin lispro Injectable (ADMELOG) 8 Unit(s) SubCutaneous three times a day before meals  lactated ringers. 1000 milliLiter(s) (50 mL/Hr) IV Continuous <Continuous>  levETIRAcetam 250 milliGRAM(s) Oral two times a day  methimazole 5 milliGRAM(s) Oral daily  potassium chloride   Powder 40 milliEquivalent(s) Oral every 4 hours    MEDICATIONS  (PRN):  acetaminophen     Tablet .. 650 milliGRAM(s) Oral every 6 hours PRN Temp greater or equal to 38C (100.4F), Mild Pain (1 - 3)      FAMILY HISTORY:      SOCIAL HISTORY:      REVIEW OF SYSTEMS:  CONSTITUTIONAL: No fever, weight loss, or fatigue  EYES: No eye pain, visual disturbances, or discharge  ENT:  No difficulty hearing, tinnitus, vertigo; No sinus or throat pain  NECK: No pain or stiffness  RESPIRATORY: No cough, wheezing, chills or hemoptysis; No Shortness of Breath  CARDIOVASCULAR: No chest pain, palpitations, passing out, dizziness, or leg swelling  GASTROINTESTINAL: No abdominal or epigastric pain. No nausea, vomiting, or hematemesis; No diarrhea or constipation. No melena or hematochezia.  GENITOURINARY: No dysuria, frequency, hematuria, or incontinence  NEUROLOGICAL: No headaches, memory loss, loss of strength, numbness, or tremors  SKIN: No itching, burning, rashes, or lesions   LYMPH Nodes: No enlarged glands  ENDOCRINE: No heat or cold intolerance; No hair loss  MUSCULOSKELETAL: No joint pain or swelling; No muscle, back, or extremity pain  PSYCHIATRIC: No depression, anxiety, mood swings, or difficulty sleeping  HEME/LYMPH: No easy bruising, or bleeding gums  ALLERGY AND IMMUNOLOGIC: No hives or eczema	        Vital Signs Last 24 Hrs  T(C): 36 (19 Mar 2025 08:55), Max: 36.3 (18 Mar 2025 14:38)  T(F): 96.8 (19 Mar 2025 08:55), Max: 97.4 (18 Mar 2025 14:38)  HR: 71 (19 Mar 2025 06:30) (71 - 84)  BP: 146/74 (19 Mar 2025 06:30) (136/74 - 152/72)  BP(mean): 98 (19 Mar 2025 06:30) (95 - 98)  RR: 18 (19 Mar 2025 05:30) (18 - 18)  SpO2: 99% (19 Mar 2025 05:30) (94% - 99%)    Parameters below as of 19 Mar 2025 05:30  Patient On (Oxygen Delivery Method): room air          Constitutional:    NC/AT:    HEENT:    Neck:  No JVD, bruits or thyromegaly    Respiratory:  Clear without rales or rhonchi    Cardiovascular:  RR without murmur, rub or gallop.    Gastrointestinal: Soft without hepatosplenomegaly.    Extremities: without cyanosis, clubbing or edema.    Neurological:  Oriented   x      . No gross sensory or motor defects.        LABS:                        8.8    8.22  )-----------( 331      ( 19 Mar 2025 05:30 )             27.1     03-19    139  |  106  |  18  ----------------------------<  160[H]  3.1[L]   |  24  |  0.82    Ca    8.7      19 Mar 2025 05:30  Phos  3.8     03-18  Mg     1.4     03-18            Urinalysis Basic - ( 19 Mar 2025 05:30 )    Color: x / Appearance: x / SG: x / pH: x  Gluc: 160 mg/dL / Ketone: x  / Bili: x / Urobili: x   Blood: x / Protein: x / Nitrite: x   Leuk Esterase: x / RBC: x / WBC x   Sq Epi: x / Non Sq Epi: x / Bacteria: x      CAPILLARY BLOOD GLUCOSE      POCT Blood Glucose.: 154 mg/dL (19 Mar 2025 07:49)  POCT Blood Glucose.: 186 mg/dL (18 Mar 2025 20:54)  POCT Blood Glucose.: 279 mg/dL (18 Mar 2025 17:06)  POCT Blood Glucose.: 251 mg/dL (18 Mar 2025 11:41)      RADIOLOGY & ADDITIONAL STUDIES: Patient is a 89y old  Female who presents with a chief complaint of Sepsis     (18 Mar 2025 13:55)      HPI:  89 yo F , AAOx3, ambulates with a walker, from Home,  w/ hx of DM2, HTN, OA, spinal stenosis, CKD III, chronic anemia, hyperthyroidism, seizures on Keppra p/w decreased appetite, weakness, and altered mental status.  AMS. Patients daughter by bedside reports patient has not eaten well in the past three days and has been very confused and lethargic . Denies any fall or recent infection.     To note: Patient was admitted on 3/1 for ambulatory dysfunction.  A HHA was in the process of being set up after admission. Endocrine consulted for dm management. Pt states she self injects insulin - however does not recall the correct doses or FSG. Currently eating well     ED course:  Vitals: temp 100.4, HR 34, /57, RR 16  spo2 94  WBC 16 , Potassium 3.3   Ua+, rvp neg   s/p CTX, 2L of Nacl  (16 Mar 2025 04:04)      PAST MEDICAL & SURGICAL HISTORY:  Anemia      Anxiety      Arthritis      Cataract      Cervical spinal stenosis      DM (diabetes mellitus)      HLD (hyperlipidemia)      HTN (hypertension)             MEDICATIONS  (STANDING):  amLODIPine   Tablet 10 milliGRAM(s) Oral daily  atorvastatin 10 milliGRAM(s) Oral at bedtime  hydrALAZINE 50 milliGRAM(s) Oral two times a day  influenza  Vaccine (HIGH DOSE) 0.5 milliLiter(s) IntraMuscular once  insulin glargine Injectable (LANTUS) 12 Unit(s) SubCutaneous at bedtime  insulin lispro (ADMELOG) corrective regimen sliding scale   SubCutaneous three times a day before meals  insulin lispro (ADMELOG) corrective regimen sliding scale   SubCutaneous at bedtime  insulin lispro Injectable (ADMELOG) 8 Unit(s) SubCutaneous three times a day before meals  lactated ringers. 1000 milliLiter(s) (50 mL/Hr) IV Continuous <Continuous>  levETIRAcetam 250 milliGRAM(s) Oral two times a day  methimazole 5 milliGRAM(s) Oral daily  potassium chloride   Powder 40 milliEquivalent(s) Oral every 4 hours    MEDICATIONS  (PRN):  acetaminophen     Tablet .. 650 milliGRAM(s) Oral every 6 hours PRN Temp greater or equal to 38C (100.4F), Mild Pain (1 - 3)      FAMILY HISTORY:      SOCIAL HISTORY:      REVIEW OF SYSTEMS:  CONSTITUTIONAL: No fever, weight loss, or fatigue  EYES: No eye pain, visual disturbances, or discharge  ENT:  No difficulty hearing, tinnitus, vertigo; No sinus or throat pain  NECK: No pain or stiffness  RESPIRATORY: No cough, wheezing, chills or hemoptysis; No Shortness of Breath  CARDIOVASCULAR: No chest pain, palpitations, passing out, dizziness, or leg swelling  GASTROINTESTINAL: No abdominal or epigastric pain. No nausea, vomiting, or hematemesis; No diarrhea or constipation. No melena or hematochezia.  GENITOURINARY: No dysuria, frequency, hematuria, or incontinence  NEUROLOGICAL: No headaches, memory loss, loss of strength, numbness, or tremors  SKIN: No itching, burning, rashes, or lesions   LYMPH Nodes: No enlarged glands  ENDOCRINE: No heat or cold intolerance; No hair loss  MUSCULOSKELETAL: No joint pain or swelling; No muscle, back, or extremity pain  PSYCHIATRIC: No depression, anxiety, mood swings, or difficulty sleeping  HEME/LYMPH: No easy bruising, or bleeding gums  ALLERGY AND IMMUNOLOGIC: No hives or eczema	        Vital Signs Last 24 Hrs  T(C): 36 (19 Mar 2025 08:55), Max: 36.3 (18 Mar 2025 14:38)  T(F): 96.8 (19 Mar 2025 08:55), Max: 97.4 (18 Mar 2025 14:38)  HR: 71 (19 Mar 2025 06:30) (71 - 84)  BP: 146/74 (19 Mar 2025 06:30) (136/74 - 152/72)  BP(mean): 98 (19 Mar 2025 06:30) (95 - 98)  RR: 18 (19 Mar 2025 05:30) (18 - 18)  SpO2: 99% (19 Mar 2025 05:30) (94% - 99%)    Parameters below as of 19 Mar 2025 05:30  Patient On (Oxygen Delivery Method): room air          Constitutional:    HEENT: nad    Neck:  No JVD, bruits or thyromegaly    Respiratory:  Clear without rales or rhonchi    Cardiovascular:  RR without murmur, rub or gallop.    Gastrointestinal: Soft without hepatosplenomegaly.    Extremities: without cyanosis, clubbing or edema.    Neurological:  Oriented   x 2-3     . No gross sensory or motor defects.        LABS:                        8.8    8.22  )-----------( 331      ( 19 Mar 2025 05:30 )             27.1     03-19    139  |  106  |  18  ----------------------------<  160[H]  3.1[L]   |  24  |  0.82    Ca    8.7      19 Mar 2025 05:30  Phos  3.8     03-18  Mg     1.4     03-18            Urinalysis Basic - ( 19 Mar 2025 05:30 )    Color: x / Appearance: x / SG: x / pH: x  Gluc: 160 mg/dL / Ketone: x  / Bili: x / Urobili: x   Blood: x / Protein: x / Nitrite: x   Leuk Esterase: x / RBC: x / WBC x   Sq Epi: x / Non Sq Epi: x / Bacteria: x      CAPILLARY BLOOD GLUCOSE      POCT Blood Glucose.: 154 mg/dL (19 Mar 2025 07:49)  POCT Blood Glucose.: 186 mg/dL (18 Mar 2025 20:54)  POCT Blood Glucose.: 279 mg/dL (18 Mar 2025 17:06)  POCT Blood Glucose.: 251 mg/dL (18 Mar 2025 11:41)      RADIOLOGY & ADDITIONAL STUDIES:

## 2025-03-19 NOTE — PROGRESS NOTE ADULT - PROBLEM SELECTOR PLAN 1
- P/w Fever & WBC 16   - S/p RVP negative    - (+) UA & Ucx-Klebsiella & Aerococcus    - S/p Bld cx NGTD  - S/p Ceftriaxone
p/w fever, elevated white count   WBC 16 , Potassium 3.3   Ua+, rvp neg   likely in setting of UTI  BCX NGTD  UCX sensitive to ceftriaxone- completed course  s/p IVF  tylenol prn

## 2025-03-19 NOTE — DISCHARGE NOTE PROVIDER - NSDCMRMEDTOKEN_GEN_ALL_CORE_FT
amLODIPine 10 mg oral tablet: 1 tab(s) orally once a day  atorvastatin 10 mg oral tablet: 1 tab(s) orally once a day  Basaglar KwikPen 100 units/mL subcutaneous solution: 5 unit(s) subcutaneous once a day  hydrALAZINE 50 mg oral tablet: 1 tab(s) orally 2 times a day  Januvia 50 mg oral tablet: 1 tab(s) orally once a day  levETIRAcetam 250 mg oral tablet: 1 tab(s) orally once a day  methIMAzole 5 mg oral tablet: 1 tab(s) orally once a day  NovoLOG FlexPen 100 units/mL injectable solution: 2 unit(s) injectable 3 times a day   amLODIPine 10 mg oral tablet: 1 tab(s) orally once a day  atorvastatin 10 mg oral tablet: 1 tab(s) orally once a day  Basaglar KwikPen 100 units/mL subcutaneous solution: 10 unit(s) subcutaneous once a day  hydrALAZINE 50 mg oral tablet: 1 tab(s) orally 2 times a day  levETIRAcetam 250 mg oral tablet: 1 tab(s) orally 2 times a day  methIMAzole 5 mg oral tablet: 1 tab(s) orally once a day  NovoLOG FlexPen 100 units/mL injectable solution: 2 unit(s) injectable 3 times a day

## 2025-03-19 NOTE — DISCHARGE NOTE PROVIDER - NSDCCPCAREPLAN_GEN_ALL_CORE_FT
PRINCIPAL DISCHARGE DIAGNOSIS  Diagnosis: Sepsis  Assessment and Plan of Treatment: You presented to the hospital with a change in mental status. You were admitted and treated for sepsis. Sepsis is the body's extreme response to an infection. The cause of your sepsis is likely due to a urinary tract infection. You completed your antibiotic course.  Call you Health care provider upon arrival home to make a one week follow up appointment.  If you develop fever, chills, malaise, or change in mental status call your Health Care Provider or go to the Emergency Department.  Nutrition is important, eat small frequent meals to help ensure you get adequate calories.  Do not stay in bed all day!  Increase your activity daily as tolerated.        SECONDARY DISCHARGE DIAGNOSES  Diagnosis: Acute UTI  Assessment and Plan of Treatment: You were treated for a urinary tract infection with IV antibioitcs. You completed your full antibiotic course while in the hospital.  Drink enough water and fluids to keep your urine clear or pale yellow.  Avoid caffeine, tea, and carbonated beverages. They tend to irritate your bladder.  Empty your bladder often. Avoid holding urine for long periods of time.  SEEK IMMEDIATE MEDICAL CARE IF:  You have severe back pain or lower abdominal pain.  You develop chills.  You have nausea or vomiting.  You have continued burning or discomfort with urination.      Diagnosis: Metabolic encephalopathy  Assessment and Plan of Treatment: You presented to the hospital with decreased oral intake and change in mental status. The cause of your encephalopathy is likely due to infection. Encephalopathy is a term used to describe brain disease or brain damage. It usually develops because of a health condition such an infection.   Call 911 or have someone else call for any of the following: You cannot be woken. You had a seizure.  Seek care immediately if: You had a seizure. You feel confused, dizzy, or lightheaded.  Your heart is beating faster than is normal for you. You have sudden shortness of breath.  Contact your healthcare provider if: You have a fever. You are sleeping more than usual.  Manage encephalopathy:  Take your medication as prescribed. Do not drink alcohol.   Follow up with your healthcare provider      Diagnosis: DM (diabetes mellitus)  Assessment and Plan of Treatment: Your a1c was ???? you were seen by an endocrinologist who reccommends: ?????  Continue to follow with your primary care MD or your endocrinologist. Discuss what the goal hemoglobin A1C level is for you.  Follow a heart healthy diabetic diet. If you check your fingerstick glucose at home, call your MD if it is greater than 250mg/dL on 2 occasions or less than 100mg/dL on 2 occasions. Know signs of low blood sugar, such as: dizziness, shakiness, sweating, confusion, hunger, nervousness- drink 4 ounces apple juice if occurs and call your doctor. Know early signs of high blood sugar, such as: frequent urination, increased thirst, blurry vision, fatigue, headache - call your doctor if this occurs.      Diagnosis: HTN (hypertension)  Assessment and Plan of Treatment: You have a history of high blood pressure. High blood pressure is a condition that puts you at risk for heart attack, stroke and kidney disease. Please continue to take your medications as prescribed. You can also help control your blood pressure by maintaining a healthy weight, eating a diet low in fat and rich in fruits and vegetables, reduce the amount of salt in your diet. Also, reduce alcohol and try to include some form of physical activity daily for at least 30 mins. Follow up with your medical doctor to establish long term blood pressure treatment goals.  Notify your doctor if you have any of the following symptoms:   Dizziness, Lightheadedness, Blurry vision, Headache, Chest pain, Shortness of breath      Diagnosis: Seizure  Assessment and Plan of Treatment: You have a history of seizures, please take your medications as prescribed. Follow- up routinely with your healthcare provider. A seizure is an episode of abnormal brain activity. A seizure can cause jerky muscle movements, loss of consciousness, or confusion.  Call your local emergency number (911 in the [place]) or have someone else call for any of the following:  Your seizure lasts longer than 5 minutes.  You have a second seizure within 24 hours of your first.  You have trouble breathing after a seizure.  You cannot be woken after your seizure.  You have more than 1 seizure before you are fully awake or aware.  You have a seizure in water.  Do not panic.  Instructions if you had a seizure:  Gently guide me to the floor or a soft surface.  Do not hold me down or put anything in my mouth.  Place me on my side to help prevent me from swallowing saliva or vomit.  First Aid: Seizures (ADULT)  Protect me from injury. Remove sharp or hard objects from the area surrounding me, or cushion my head.  Loosen the clothing around my head and neck.  Time how long my seizure lasts. Call 911 if my seizure lasts longer than 5 minutes or if I have a second seizure.  Stay with me until my seizure ends. Let me rest until I am fully awake.  Perform CPR if I stop breathing or you cannot feel my pulse.  Do not give me anything to eat or drink until I am fully awake.  Follow up with your doctor or neurologist.      Diagnosis: Hyperthyroidism  Assessment and Plan of Treatment: Hyperthyroidism is a condition that develops when your thyroid hormone levels are high. Thyroid hormones help control body temperature, heart rate, growth, and weight.  Call 911 for any of the following: You have sudden chest pain or shortness of breath. You have a seizure. Your heart is beating faster than usual. You feel like you are going to faint.  Medicines: Antithyroid medicines decrease thyroid hormone levels and your symptoms. Take your medicine as directed.   Follow up with your healthcare provider      Diagnosis: Chronic anemia  Assessment and Plan of Treatment: You were found to have a low blood count on admission. Anemia is a low number of red blood cells. Some causes of anemia include: A gastrointestinal bleed, Liver or kidney disease, cancer, Alcohol abuse and Lack of foods that contain iron, folic acid, or vitamin B12. Symptoms to report, bleeding, palpitations, fatigue, pale skin, cold skin, dizziness. Take medications as ordered by PCP.  Follow- up with your PCP.      Diagnosis: HLD (hyperlipidemia)  Assessment and Plan of Treatment: You have a history of hyperlipidemia, which is when you have too much cholesterol in your blood. High amounts of cholesterol in your blood can put you at higher risks for heart attck, strokes and other health problems. Follow up with PCP for treatment goals, continue medication as prescribed, have liver function testing every 3 months as anti lipid medications can cause liver irritation, eat low fat meals, avoid red meat, butter, fried foods and cheese. Get daily exercise.       PRINCIPAL DISCHARGE DIAGNOSIS  Diagnosis: Sepsis  Assessment and Plan of Treatment: You presented to the hospital with a change in mental status. You were admitted and treated for sepsis. Sepsis is the body's extreme response to an infection. The cause of your sepsis is likely due to a urinary tract infection. You completed your antibiotic course.  Call you Health care provider upon arrival home to make a one week follow up appointment.  If you develop fever, chills, malaise, or change in mental status call your Health Care Provider or go to the Emergency Department.  Nutrition is important, eat small frequent meals to help ensure you get adequate calories.  Do not stay in bed all day!  Increase your activity daily as tolerated.        SECONDARY DISCHARGE DIAGNOSES  Diagnosis: Acute UTI  Assessment and Plan of Treatment: You were treated for a urinary tract infection with IV antibioitcs. You completed your full antibiotic course while in the hospital.  Drink enough water and fluids to keep your urine clear or pale yellow.  Avoid caffeine, tea, and carbonated beverages. They tend to irritate your bladder.  Empty your bladder often. Avoid holding urine for long periods of time.  SEEK IMMEDIATE MEDICAL CARE IF:  You have severe back pain or lower abdominal pain.  You develop chills.  You have nausea or vomiting.  You have continued burning or discomfort with urination.      Diagnosis: DM (diabetes mellitus)  Assessment and Plan of Treatment: Your a1c was ???? you were seen by an endocrinologist who reccommends: ?????  Continue to follow with your primary care MD or your endocrinologist. Discuss what the goal hemoglobin A1C level is for you.  Follow a heart healthy diabetic diet. If you check your fingerstick glucose at home, call your MD if it is greater than 250mg/dL on 2 occasions or less than 100mg/dL on 2 occasions. Know signs of low blood sugar, such as: dizziness, shakiness, sweating, confusion, hunger, nervousness- drink 4 ounces apple juice if occurs and call your doctor. Know early signs of high blood sugar, such as: frequent urination, increased thirst, blurry vision, fatigue, headache - call your doctor if this occurs.      Diagnosis: HTN (hypertension)  Assessment and Plan of Treatment: You have a history of high blood pressure. High blood pressure is a condition that puts you at risk for heart attack, stroke and kidney disease. Please continue to take your medications as prescribed. You can also help control your blood pressure by maintaining a healthy weight, eating a diet low in fat and rich in fruits and vegetables, reduce the amount of salt in your diet. Also, reduce alcohol and try to include some form of physical activity daily for at least 30 mins. Follow up with your medical doctor to establish long term blood pressure treatment goals.  Notify your doctor if you have any of the following symptoms:   Dizziness, Lightheadedness, Blurry vision, Headache, Chest pain, Shortness of breath      Diagnosis: HLD (hyperlipidemia)  Assessment and Plan of Treatment: You have a history of hyperlipidemia, which is when you have too much cholesterol in your blood. High amounts of cholesterol in your blood can put you at higher risks for heart attck, strokes and other health problems. Follow up with PCP for treatment goals, continue medication as prescribed, have liver function testing every 3 months as anti lipid medications can cause liver irritation, eat low fat meals, avoid red meat, butter, fried foods and cheese. Get daily exercise.      Diagnosis: Seizure  Assessment and Plan of Treatment: You have a history of seizures, please take your medications as prescribed. Follow- up routinely with your healthcare provider. A seizure is an episode of abnormal brain activity. A seizure can cause jerky muscle movements, loss of consciousness, or confusion.  Call your local emergency number (911 in the [place]) or have someone else call for any of the following:  Your seizure lasts longer than 5 minutes.  You have a second seizure within 24 hours of your first.  You have trouble breathing after a seizure.  You cannot be woken after your seizure.  You have more than 1 seizure before you are fully awake or aware.  You have a seizure in water.  Do not panic.  Instructions if you had a seizure:  Gently guide me to the floor or a soft surface.  Do not hold me down or put anything in my mouth.  Place me on my side to help prevent me from swallowing saliva or vomit.  First Aid: Seizures (ADULT)  Protect me from injury. Remove sharp or hard objects from the area surrounding me, or cushion my head.  Loosen the clothing around my head and neck.  Time how long my seizure lasts. Call 911 if my seizure lasts longer than 5 minutes or if I have a second seizure.  Stay with me until my seizure ends. Let me rest until I am fully awake.  Perform CPR if I stop breathing or you cannot feel my pulse.  Do not give me anything to eat or drink until I am fully awake.  Follow up with your doctor or neurologist.      Diagnosis: Hyperthyroidism  Assessment and Plan of Treatment: Hyperthyroidism is a condition that develops when your thyroid hormone levels are high. Thyroid hormones help control body temperature, heart rate, growth, and weight.  Call 911 for any of the following: You have sudden chest pain or shortness of breath. You have a seizure. Your heart is beating faster than usual. You feel like you are going to faint.  Medicines: Antithyroid medicines decrease thyroid hormone levels and your symptoms. Take your medicine as directed.   Follow up with your healthcare provider      Diagnosis: Metabolic encephalopathy  Assessment and Plan of Treatment: You presented to the hospital with decreased oral intake and change in mental status. The cause of your encephalopathy is likely due to infection. Encephalopathy is a term used to describe brain disease or brain damage. It usually develops because of a health condition such an infection.   Call 911 or have someone else call for any of the following: You cannot be woken. You had a seizure.  Seek care immediately if: You had a seizure. You feel confused, dizzy, or lightheaded.  Your heart is beating faster than is normal for you. You have sudden shortness of breath.  Contact your healthcare provider if: You have a fever. You are sleeping more than usual.  Manage encephalopathy:  Take your medication as prescribed. Do not drink alcohol.   Follow up with your healthcare provider      Diagnosis: Chronic anemia  Assessment and Plan of Treatment: You were found to have a low blood count on admission. Anemia is a low number of red blood cells. Some causes of anemia include: A gastrointestinal bleed, Liver or kidney disease, cancer, Alcohol abuse and Lack of foods that contain iron, folic acid, or vitamin B12. Symptoms to report, bleeding, palpitations, fatigue, pale skin, cold skin, dizziness. Take medications as ordered by PCP.  Follow- up with your PCP.      Diagnosis: Vasogenic edema  Assessment and Plan of Treatment: CTH showing vasogenic edema, unchanged from prior admission. You were given one dose of IV steroids, further doses held per neurosurgery recommendations on last admission, and as your daughter stated that you are unable to tolerate steroids due to mentation changes.

## 2025-03-19 NOTE — CONSULT NOTE ADULT - ASSESSMENT
89 yo F , AAOx3, ambulates with a walker, from Home,  w/ hx of DM2, HTN, OA, spinal stenosis, CKD III, chronic anemia, hyperthyroidism, seizures on Keppra p/w decreased appetite, weakness, and altered mental status.  . Endocrine consulted for dm management. Pt states she self injects insulin - however does not recall the correct doses or FSG. Currently eating well

## 2025-03-19 NOTE — CHART NOTE - NSCHARTNOTEFT_GEN_A_CORE
NP informed by RN, FS=56.    RN to give juice and recheck FS.    DC'd premeal insulin.  Continue to monitor FS and adjust insulin accordingly.    Repeat WR=811.
I attempted to get in touch with PCP to see if discussion had been had about steroids in outpatient setting, no answer.    According to recent DC Summary (separate MRN [id number]), patient was discharged off steroids after Dr. Olivo of Neurosurgery ([facility]) was consulted.
I have seen and evaluated the patient at the bedside. Please see H&P.   Patient is asleep. I was able to wake her, but she could not say where she was, went back to sleep.     I have reviewed her CBC, BMP. WBC elevated at 15. Hgb 8.3 g/dL. Glucose elevated 200s-300s. K low 3.1. Cr normal 0.91.   Urinalysis with positive nitrite and LE, pyuria with 24 WBC/hpf.     Assessment and Plan:    89 year old woman with medical hx including DM2, HTN, CKD3, hyperthyroidism, seizures on Keppra with chronic meningioma noted with vasogenic edema and mild left shift with recent admission for  ambulatory dysfunction and  acute encephalopathy. She had multispecialty care before discharge and after follow up with Neurology, plans for follow up with oncology to determine utility of steroid therapy.    She comes in today on account of acute changes in mental status with lethargy and confusion. There is associated FTT with anorexia.  Based on recent admission   Acute encephalopathy could be related to   - worsening vasogenic edema related to chronic meningioma  - UTI with sepsis    Plan   Admit to Medicine   Sepsis work up   CT head to evaluate vasogenic edema; consider steroid use if CT head shows worsening of vasogenic edema. (CT Head ordered, pending  Empiric IV antibiotics for UTI - ceftriaxone 1g daily   gentle IVF hydration   replace K  med reconciliation   resume home meds   Other plans as above.

## 2025-03-19 NOTE — DISCHARGE NOTE PROVIDER - PROVIDER TOKENS
PROVIDER:[TOKEN:[[id number]]] PROVIDER:[TOKEN:[[id number]]],PROVIDER:[TOKEN:[[id number]]]

## 2025-03-19 NOTE — PROGRESS NOTE ADULT - PROBLEM SELECTOR PLAN 3
H/o Meningioma discovered on last admission  - S/p CTH negative and stable   - No plan or indication for steroids  - Pt to f/u OP w/ Neurology
hx of meningioma discovered on last admission  CTH showed no change from last admission  patient with different MRN  patient was d/c'd without steroids  mental status improving

## 2025-03-20 ENCOUNTER — TRANSCRIPTION ENCOUNTER (OUTPATIENT)
Age: 89
End: 2025-03-20

## 2025-03-20 VITALS
RESPIRATION RATE: 18 BRPM | OXYGEN SATURATION: 98 % | SYSTOLIC BLOOD PRESSURE: 141 MMHG | HEART RATE: 70 BPM | TEMPERATURE: 98 F | DIASTOLIC BLOOD PRESSURE: 76 MMHG

## 2025-03-20 LAB
ANION GAP SERPL CALC-SCNC: 7 MMOL/L — SIGNIFICANT CHANGE UP (ref 5–17)
BUN SERPL-MCNC: 14 MG/DL — SIGNIFICANT CHANGE UP (ref 7–18)
CALCIUM SERPL-MCNC: 8.8 MG/DL — SIGNIFICANT CHANGE UP (ref 8.4–10.5)
CHLORIDE SERPL-SCNC: 110 MMOL/L — HIGH (ref 96–108)
CO2 SERPL-SCNC: 25 MMOL/L — SIGNIFICANT CHANGE UP (ref 22–31)
CREAT SERPL-MCNC: 0.72 MG/DL — SIGNIFICANT CHANGE UP (ref 0.5–1.3)
EGFR: 77 ML/MIN/1.73M2 — SIGNIFICANT CHANGE UP
EGFR: 77 ML/MIN/1.73M2 — SIGNIFICANT CHANGE UP
GLUCOSE BLDC GLUCOMTR-MCNC: 110 MG/DL — HIGH (ref 70–99)
GLUCOSE BLDC GLUCOMTR-MCNC: 179 MG/DL — HIGH (ref 70–99)
GLUCOSE SERPL-MCNC: 107 MG/DL — HIGH (ref 70–99)
HCT VFR BLD CALC: 26.1 % — LOW (ref 34.5–45)
HGB BLD-MCNC: 8.5 G/DL — LOW (ref 11.5–15.5)
MCHC RBC-ENTMCNC: 26.1 PG — LOW (ref 27–34)
MCHC RBC-ENTMCNC: 32.6 G/DL — SIGNIFICANT CHANGE UP (ref 32–36)
MCV RBC AUTO: 80.1 FL — SIGNIFICANT CHANGE UP (ref 80–100)
NRBC BLD AUTO-RTO: 0 /100 WBCS — SIGNIFICANT CHANGE UP (ref 0–0)
PHOSPHATE SERPL-MCNC: 2.6 MG/DL — SIGNIFICANT CHANGE UP (ref 2.5–4.5)
PLATELET # BLD AUTO: 124 K/UL — LOW (ref 150–400)
POTASSIUM SERPL-MCNC: 4.2 MMOL/L — SIGNIFICANT CHANGE UP (ref 3.5–5.3)
POTASSIUM SERPL-SCNC: 4.2 MMOL/L — SIGNIFICANT CHANGE UP (ref 3.5–5.3)
RBC # BLD: 3.26 M/UL — LOW (ref 3.8–5.2)
RBC # FLD: 15.5 % — HIGH (ref 10.3–14.5)
SODIUM SERPL-SCNC: 142 MMOL/L — SIGNIFICANT CHANGE UP (ref 135–145)
WBC # BLD: 5.52 K/UL — SIGNIFICANT CHANGE UP (ref 3.8–10.5)
WBC # FLD AUTO: 5.52 K/UL — SIGNIFICANT CHANGE UP (ref 3.8–10.5)

## 2025-03-20 RX ORDER — LEVETIRACETAM 10 MG/ML
1 INJECTION, SOLUTION INTRAVENOUS
Qty: 60 | Refills: 0
Start: 2025-03-20 | End: 2025-04-18

## 2025-03-20 RX ORDER — SITAGLIPTIN 100 MG/1
1 TABLET, FILM COATED ORAL
Refills: 0 | DISCHARGE

## 2025-03-20 RX ORDER — MAGNESIUM SULFATE 500 MG/ML
1 SYRINGE (ML) INJECTION ONCE
Refills: 0 | Status: COMPLETED | OUTPATIENT
Start: 2025-03-20 | End: 2025-03-20

## 2025-03-20 RX ORDER — LEVETIRACETAM 10 MG/ML
1 INJECTION, SOLUTION INTRAVENOUS
Refills: 0 | DISCHARGE

## 2025-03-20 RX ADMIN — Medication 100 GRAM(S): at 10:41

## 2025-03-20 RX ADMIN — LEVETIRACETAM 250 MILLIGRAM(S): 10 INJECTION, SOLUTION INTRAVENOUS at 05:30

## 2025-03-20 RX ADMIN — AMLODIPINE BESYLATE 10 MILLIGRAM(S): 10 TABLET ORAL at 05:31

## 2025-03-20 RX ADMIN — Medication 50 MILLIGRAM(S): at 05:30

## 2025-03-20 RX ADMIN — INSULIN LISPRO 1: 100 INJECTION, SOLUTION INTRAVENOUS; SUBCUTANEOUS at 11:51

## 2025-03-20 NOTE — DISCHARGE NOTE NURSING/CASE MANAGEMENT/SOCIAL WORK - PATIENT PORTAL LINK FT
You can access the FollowMyHealth Patient Portal offered by [facility] by registering at the following website: [email or web address]. By joining [employer]’s FollowMyHealth portal, you will also be able to view your health information using other applications (apps) compatible with our system.

## 2025-03-20 NOTE — DISCHARGE NOTE NURSING/CASE MANAGEMENT/SOCIAL WORK - FINANCIAL ASSISTANCE
[facility] provides services at a reduced cost to those who are determined to be eligible through [facility]’s financial assistance program. Information regarding [facility]’s financial assistance program can be found by going to [email or web address] or by calling [phone].

## 2025-03-20 NOTE — DISCHARGE NOTE NURSING/CASE MANAGEMENT/SOCIAL WORK - NSDCPEFALRISK_GEN_ALL_CORE
For information on Fall & Injury Prevention, visit: [email or web address] OR  [email or web address] OR  https://www.cdc.gov/steadi/patient.html

## 2025-03-20 NOTE — PROGRESS NOTE ADULT - SUBJECTIVE AND OBJECTIVE BOX
Interval Events:      Allergies    IV Contrast (Anaphylaxis)  Aspir 81 (Anaphylaxis)    Intolerances      Endocrine/Metabolic Medications:  atorvastatin 10 milliGRAM(s) Oral at bedtime  insulin glargine Injectable (LANTUS) 12 Unit(s) SubCutaneous at bedtime  insulin lispro (ADMELOG) corrective regimen sliding scale   SubCutaneous three times a day before meals  insulin lispro (ADMELOG) corrective regimen sliding scale   SubCutaneous at bedtime  methimazole 5 milliGRAM(s) Oral daily      Vital Signs Last 24 Hrs  T(C): 36.3 (20 Mar 2025 05:25), Max: 36.4 (19 Mar 2025 12:55)  T(F): 97.4 (20 Mar 2025 05:25), Max: 97.5 (19 Mar 2025 12:55)  HR: 75 (20 Mar 2025 06:06) (74 - 81)  BP: 147/75 (20 Mar 2025 06:06) (133/68 - 177/82)  BP(mean): 114 (20 Mar 2025 05:25) (90 - 114)  RR: 18 (20 Mar 2025 06:06) (18 - 18)  SpO2: 98% (20 Mar 2025 06:06) (97% - 98%)    Parameters below as of 20 Mar 2025 06:06  Patient On (Oxygen Delivery Method): room air          PHYSICAL EXAM  All physical exam findings normal, except those marked:  General:	Alert, active, cooperative, NAD, well hydrated  .		[] Abnormal:  Neck		Normal: supple, no cervical adenopathy, no palpable thyroid  .		[] Abnormal:  Cardiovascular	Normal: regular rate, normal S1, S2, no murmurs  .		[] Abnormal:  Respiratory	Normal: no chest wall deformity, normal respiratory pattern, CTA B/L  .		[] Abnormal:  Abdominal	Normal: soft, ND, NT, bowel sounds present, no masses, no organomegaly  .		[] Abnormal:  		Normal normal genitalia, testes descended, circumcised/uncircumcised  .		Leonardo stage:			Breast leonardo:  .		Menstrual history:  .		[] Abnormal:  Extremities	Normal: FROM x4  .		[] Abnormal:  Skin		Normal: intact and not indurated, no rash, no acanthosis nigricans  .		[] Abnormal:  Neurologic	Normal: grossly intact  .		[] Abnormal:    LABS                        8.5    5.52  )-----------( 124      ( 20 Mar 2025 05:25 )             26.1                               142    |  110    |  14                  Calcium: 8.8   / iCa: x      (03-20 @ 05:25)    ----------------------------<  107       Magnesium: 1.3                              4.2     |  25     |  0.72             Phosphorous: 2.6        CAPILLARY BLOOD GLUCOSE      POCT Blood Glucose.: 110 mg/dL (20 Mar 2025 07:45)  POCT Blood Glucose.: 177 mg/dL (19 Mar 2025 21:05)  POCT Blood Glucose.: 107 mg/dL (19 Mar 2025 16:23)  POCT Blood Glucose.: 57 mg/dL (19 Mar 2025 15:00)  POCT Blood Glucose.: 86 mg/dL (19 Mar 2025 11:38)        Assesment/plan       Interval Events:  pt in nad    Allergies    IV Contrast (Anaphylaxis)  Aspir 81 (Anaphylaxis)    Intolerances      Endocrine/Metabolic Medications:  atorvastatin 10 milliGRAM(s) Oral at bedtime  insulin glargine Injectable (LANTUS) 12 Unit(s) SubCutaneous at bedtime  insulin lispro (ADMELOG) corrective regimen sliding scale   SubCutaneous three times a day before meals  insulin lispro (ADMELOG) corrective regimen sliding scale   SubCutaneous at bedtime  methimazole 5 milliGRAM(s) Oral daily      Vital Signs Last 24 Hrs  T(C): 36.3 (20 Mar 2025 05:25), Max: 36.4 (19 Mar 2025 12:55)  T(F): 97.4 (20 Mar 2025 05:25), Max: 97.5 (19 Mar 2025 12:55)  HR: 75 (20 Mar 2025 06:06) (74 - 81)  BP: 147/75 (20 Mar 2025 06:06) (133/68 - 177/82)  BP(mean): 114 (20 Mar 2025 05:25) (90 - 114)  RR: 18 (20 Mar 2025 06:06) (18 - 18)  SpO2: 98% (20 Mar 2025 06:06) (97% - 98%)    Parameters below as of 20 Mar 2025 06:06  Patient On (Oxygen Delivery Method): room air          PHYSICAL EXAM  All physical exam findings normal, except those marked:  General:	Alert, active, cooperative, NAD, well hydrated  .		[] Abnormal:  Neck		Normal: supple, no cervical adenopathy, no palpable thyroid  .		[] Abnormal:  Cardiovascular	Normal: regular rate, normal S1, S2, no murmurs  .		[] Abnormal:  Respiratory	Normal: no chest wall deformity, normal respiratory pattern, CTA B/L  .		[] Abnormal:  Abdominal	Normal: soft, ND, NT, bowel sounds present, no masses, no organomegaly  .		[] Abnormal:  		Normal normal genitalia, testes descended, circumcised/uncircumcised  .		Leonardo stage:			Breast leonardo:  .		Menstrual history:  .		[] Abnormal:  Extremities	Normal: FROM x4  .		[] Abnormal:  Skin		Normal: intact and not indurated, no rash, no acanthosis nigricans  .		[] Abnormal:  Neurologic	Normal: grossly intact  .		[] Abnormal:    LABS                        8.5    5.52  )-----------( 124      ( 20 Mar 2025 05:25 )             26.1                               142    |  110    |  14                  Calcium: 8.8   / iCa: x      (03-20 @ 05:25)    ----------------------------<  107       Magnesium: 1.3                              4.2     |  25     |  0.72             Phosphorous: 2.6        CAPILLARY BLOOD GLUCOSE      POCT Blood Glucose.: 110 mg/dL (20 Mar 2025 07:45)  POCT Blood Glucose.: 177 mg/dL (19 Mar 2025 21:05)  POCT Blood Glucose.: 107 mg/dL (19 Mar 2025 16:23)  POCT Blood Glucose.: 57 mg/dL (19 Mar 2025 15:00)  POCT Blood Glucose.: 86 mg/dL (19 Mar 2025 11:38)        Assessment and Recommendation:   · Assessment	  89 yo F , AAOx3, ambulates with a walker, from Home,  w/ hx of DM2, HTN, OA, spinal stenosis, CKD III, chronic anemia, hyperthyroidism, seizures on Keppra p/w decreased appetite, weakness, and altered mental status.  . Endocrine consulted for dm management. Pt states she self injects insulin - however does not recall the correct doses or FSG. Currently eating well          Problem/Recommendation - 1:  ·  Problem: DM (diabetes mellitus).   ·  Recommendation: uncontrolled with hyperglycemia  cont lantus 12 units  d/c admelog 8 ac tid-   only low dose admelog prn- due to poor po intake   fsg ac and hs  check a1c  aim fsg 140-180  d/w pts daughter at bedside        Problem/Recommendation - 2:  ·  Problem: Hyperthyroidism.   ·  Recommendation: on tapazole 5mg  check tfts.     Problem/Recommendation - 3:  ·  Problem: Acute sepsis.   ·  Recommendation: tx per prim team.

## 2025-03-21 LAB
CULTURE RESULTS: SIGNIFICANT CHANGE UP
CULTURE RESULTS: SIGNIFICANT CHANGE UP
SPECIMEN SOURCE: SIGNIFICANT CHANGE UP
SPECIMEN SOURCE: SIGNIFICANT CHANGE UP

## 2025-03-31 ENCOUNTER — INPATIENT (INPATIENT)
Facility: HOSPITAL | Age: 89
LOS: 10 days | Discharge: HOSPICE | DRG: 948 | End: 2025-04-11
Attending: INTERNAL MEDICINE | Admitting: INTERNAL MEDICINE
Payer: COMMERCIAL

## 2025-03-31 VITALS
DIASTOLIC BLOOD PRESSURE: 60 MMHG | OXYGEN SATURATION: 98 % | SYSTOLIC BLOOD PRESSURE: 150 MMHG | RESPIRATION RATE: 20 BRPM | HEART RATE: 76 BPM | TEMPERATURE: 98 F

## 2025-03-31 DIAGNOSIS — R41.82 ALTERED MENTAL STATUS, UNSPECIFIED: ICD-10-CM

## 2025-03-31 PROBLEM — H26.9 UNSPECIFIED CATARACT: Chronic | Status: ACTIVE | Noted: 2025-03-16

## 2025-03-31 PROBLEM — I10 ESSENTIAL (PRIMARY) HYPERTENSION: Chronic | Status: ACTIVE | Noted: 2025-03-16

## 2025-03-31 PROBLEM — E11.9 TYPE 2 DIABETES MELLITUS WITHOUT COMPLICATIONS: Chronic | Status: ACTIVE | Noted: 2025-03-16

## 2025-03-31 PROBLEM — E78.5 HYPERLIPIDEMIA, UNSPECIFIED: Chronic | Status: ACTIVE | Noted: 2025-03-16

## 2025-03-31 PROBLEM — F41.9 ANXIETY DISORDER, UNSPECIFIED: Chronic | Status: ACTIVE | Noted: 2025-03-16

## 2025-03-31 PROBLEM — M19.90 UNSPECIFIED OSTEOARTHRITIS, UNSPECIFIED SITE: Chronic | Status: ACTIVE | Noted: 2025-03-16

## 2025-03-31 PROBLEM — D64.9 ANEMIA, UNSPECIFIED: Chronic | Status: ACTIVE | Noted: 2025-03-16

## 2025-03-31 PROBLEM — M48.02 SPINAL STENOSIS, CERVICAL REGION: Chronic | Status: ACTIVE | Noted: 2025-03-16

## 2025-03-31 LAB
ALBUMIN SERPL ELPH-MCNC: 2.9 G/DL — LOW (ref 3.3–5)
ALP SERPL-CCNC: 74 U/L — SIGNIFICANT CHANGE UP (ref 40–120)
ALT FLD-CCNC: 13 U/L — SIGNIFICANT CHANGE UP (ref 10–45)
ANION GAP SERPL CALC-SCNC: 17 MMOL/L — SIGNIFICANT CHANGE UP (ref 5–17)
APPEARANCE UR: CLEAR — SIGNIFICANT CHANGE UP
APTT BLD: 32.1 SEC — SIGNIFICANT CHANGE UP (ref 24.5–35.6)
AST SERPL-CCNC: 25 U/L — SIGNIFICANT CHANGE UP (ref 10–40)
B-OH-BUTYR SERPL-SCNC: 0.2 MMOL/L — SIGNIFICANT CHANGE UP
BACTERIA # UR AUTO: ABNORMAL /HPF
BASOPHILS # BLD AUTO: 0.04 K/UL — SIGNIFICANT CHANGE UP (ref 0–0.2)
BASOPHILS NFR BLD AUTO: 0.5 % — SIGNIFICANT CHANGE UP (ref 0–2)
BILIRUB SERPL-MCNC: 0.3 MG/DL — SIGNIFICANT CHANGE UP (ref 0.2–1.2)
BILIRUB UR-MCNC: NEGATIVE — SIGNIFICANT CHANGE UP
BUN SERPL-MCNC: 12 MG/DL — SIGNIFICANT CHANGE UP (ref 7–23)
CALCIUM SERPL-MCNC: 8.7 MG/DL — SIGNIFICANT CHANGE UP (ref 8.4–10.5)
CAST: 1 /LPF — SIGNIFICANT CHANGE UP (ref 0–4)
CHLORIDE SERPL-SCNC: 101 MMOL/L — SIGNIFICANT CHANGE UP (ref 96–108)
CO2 SERPL-SCNC: 18 MMOL/L — LOW (ref 22–31)
COLOR SPEC: YELLOW — SIGNIFICANT CHANGE UP
CREAT SERPL-MCNC: 1 MG/DL — SIGNIFICANT CHANGE UP (ref 0.5–1.3)
DIFF PNL FLD: NEGATIVE — SIGNIFICANT CHANGE UP
EGFR: 52 ML/MIN/1.73M2 — LOW
EGFR: 52 ML/MIN/1.73M2 — LOW
EOSINOPHIL # BLD AUTO: 0.1 K/UL — SIGNIFICANT CHANGE UP (ref 0–0.5)
EOSINOPHIL NFR BLD AUTO: 1.3 % — SIGNIFICANT CHANGE UP (ref 0–6)
FLUAV AG NPH QL: SIGNIFICANT CHANGE UP
FLUBV AG NPH QL: SIGNIFICANT CHANGE UP
GLUCOSE BLDC GLUCOMTR-MCNC: 154 MG/DL — HIGH (ref 70–99)
GLUCOSE BLDC GLUCOMTR-MCNC: 191 MG/DL — HIGH (ref 70–99)
GLUCOSE BLDC GLUCOMTR-MCNC: 248 MG/DL — HIGH (ref 70–99)
GLUCOSE BLDC GLUCOMTR-MCNC: 255 MG/DL — HIGH (ref 70–99)
GLUCOSE SERPL-MCNC: 67 MG/DL — LOW (ref 70–99)
GLUCOSE UR QL: NEGATIVE MG/DL — SIGNIFICANT CHANGE UP
HCT VFR BLD CALC: 25.5 % — LOW (ref 34.5–45)
HGB BLD-MCNC: 7.9 G/DL — LOW (ref 11.5–15.5)
IMM GRANULOCYTES NFR BLD AUTO: 0.4 % — SIGNIFICANT CHANGE UP (ref 0–0.9)
INR BLD: 1.11 RATIO — SIGNIFICANT CHANGE UP (ref 0.85–1.16)
KETONES UR-MCNC: NEGATIVE MG/DL — SIGNIFICANT CHANGE UP
LACTATE BLDV-MCNC: 0.9 MMOL/L — SIGNIFICANT CHANGE UP (ref 0.5–2)
LEUKOCYTE ESTERASE UR-ACNC: ABNORMAL
LYMPHOCYTES # BLD AUTO: 2.54 K/UL — SIGNIFICANT CHANGE UP (ref 1–3.3)
LYMPHOCYTES # BLD AUTO: 33.9 % — SIGNIFICANT CHANGE UP (ref 13–44)
MCHC RBC-ENTMCNC: 25.7 PG — LOW (ref 27–34)
MCHC RBC-ENTMCNC: 31 G/DL — LOW (ref 32–36)
MCV RBC AUTO: 83.1 FL — SIGNIFICANT CHANGE UP (ref 80–100)
MONOCYTES # BLD AUTO: 1.05 K/UL — HIGH (ref 0–0.9)
MONOCYTES NFR BLD AUTO: 14 % — SIGNIFICANT CHANGE UP (ref 2–14)
NEUTROPHILS # BLD AUTO: 3.74 K/UL — SIGNIFICANT CHANGE UP (ref 1.8–7.4)
NEUTROPHILS NFR BLD AUTO: 49.9 % — SIGNIFICANT CHANGE UP (ref 43–77)
NITRITE UR-MCNC: NEGATIVE — SIGNIFICANT CHANGE UP
NRBC BLD AUTO-RTO: 0 /100 WBCS — SIGNIFICANT CHANGE UP (ref 0–0)
PH UR: 8 — SIGNIFICANT CHANGE UP (ref 5–8)
PLATELET # BLD AUTO: 387 K/UL — SIGNIFICANT CHANGE UP (ref 150–400)
POTASSIUM SERPL-MCNC: 5.2 MMOL/L — SIGNIFICANT CHANGE UP (ref 3.5–5.3)
POTASSIUM SERPL-SCNC: 5.2 MMOL/L — SIGNIFICANT CHANGE UP (ref 3.5–5.3)
PROT SERPL-MCNC: 6.2 G/DL — SIGNIFICANT CHANGE UP (ref 6–8.3)
PROT UR-MCNC: SIGNIFICANT CHANGE UP MG/DL
PROTHROM AB SERPL-ACNC: 12.6 SEC — SIGNIFICANT CHANGE UP (ref 9.9–13.4)
RBC # BLD: 3.07 M/UL — LOW (ref 3.8–5.2)
RBC # FLD: 15.5 % — HIGH (ref 10.3–14.5)
RBC CASTS # UR COMP ASSIST: 1 /HPF — SIGNIFICANT CHANGE UP (ref 0–4)
RSV RNA NPH QL NAA+NON-PROBE: SIGNIFICANT CHANGE UP
SARS-COV-2 RNA SPEC QL NAA+PROBE: SIGNIFICANT CHANGE UP
SODIUM SERPL-SCNC: 136 MMOL/L — SIGNIFICANT CHANGE UP (ref 135–145)
SOURCE RESPIRATORY: SIGNIFICANT CHANGE UP
SP GR SPEC: 1.01 — SIGNIFICANT CHANGE UP (ref 1–1.03)
SQUAMOUS # UR AUTO: 3 /HPF — SIGNIFICANT CHANGE UP (ref 0–5)
UROBILINOGEN FLD QL: 1 MG/DL — SIGNIFICANT CHANGE UP (ref 0.2–1)
WBC # BLD: 7.5 K/UL — SIGNIFICANT CHANGE UP (ref 3.8–10.5)
WBC # FLD AUTO: 7.5 K/UL — SIGNIFICANT CHANGE UP (ref 3.8–10.5)
WBC UR QL: 35 /HPF — HIGH (ref 0–5)

## 2025-03-31 RX ORDER — DEXTROSE 50 % IN WATER 50 %
25 SYRINGE (ML) INTRAVENOUS ONCE
Refills: 0 | Status: DISCONTINUED | OUTPATIENT
Start: 2025-03-31 | End: 2025-04-11

## 2025-03-31 RX ORDER — DEXTROSE 50 % IN WATER 50 %
15 SYRINGE (ML) INTRAVENOUS ONCE
Refills: 0 | Status: DISCONTINUED | OUTPATIENT
Start: 2025-03-31 | End: 2025-04-11

## 2025-03-31 RX ORDER — INSULIN LISPRO 100 U/ML
INJECTION, SOLUTION INTRAVENOUS; SUBCUTANEOUS AT BEDTIME
Refills: 0 | Status: DISCONTINUED | OUTPATIENT
Start: 2025-03-31 | End: 2025-04-11

## 2025-03-31 RX ORDER — SODIUM CHLORIDE 9 G/1000ML
1000 INJECTION, SOLUTION INTRAVENOUS
Refills: 0 | Status: DISCONTINUED | OUTPATIENT
Start: 2025-03-31 | End: 2025-04-11

## 2025-03-31 RX ORDER — GLUCAGON 3 MG/1
1 POWDER NASAL ONCE
Refills: 0 | Status: DISCONTINUED | OUTPATIENT
Start: 2025-03-31 | End: 2025-04-11

## 2025-03-31 RX ORDER — DEXAMETHASONE 0.5 MG/1
4 TABLET ORAL EVERY 6 HOURS
Refills: 0 | Status: DISCONTINUED | OUTPATIENT
Start: 2025-03-31 | End: 2025-04-06

## 2025-03-31 RX ORDER — INSULIN LISPRO 100 U/ML
INJECTION, SOLUTION INTRAVENOUS; SUBCUTANEOUS
Refills: 0 | Status: DISCONTINUED | OUTPATIENT
Start: 2025-03-31 | End: 2025-04-11

## 2025-03-31 RX ORDER — LEVETIRACETAM 10 MG/ML
500 INJECTION, SOLUTION INTRAVENOUS EVERY 12 HOURS
Refills: 0 | Status: DISCONTINUED | OUTPATIENT
Start: 2025-03-31 | End: 2025-04-07

## 2025-03-31 RX ORDER — DEXTROSE 50 % IN WATER 50 %
12.5 SYRINGE (ML) INTRAVENOUS ONCE
Refills: 0 | Status: DISCONTINUED | OUTPATIENT
Start: 2025-03-31 | End: 2025-04-11

## 2025-03-31 RX ORDER — DEXAMETHASONE 0.5 MG/1
10 TABLET ORAL ONCE
Refills: 0 | Status: COMPLETED | OUTPATIENT
Start: 2025-03-31 | End: 2025-03-31

## 2025-03-31 RX ORDER — INFLUENZA A VIRUS A/IDAHO/07/2018 (H1N1) ANTIGEN (MDCK CELL DERIVED, PROPIOLACTONE INACTIVATED, INFLUENZA A VIRUS A/INDIANA/08/2018 (H3N2) ANTIGEN (MDCK CELL DERIVED, PROPIOLACTONE INACTIVATED), INFLUENZA B VIRUS B/SINGAPORE/INFTT-16-0610/2016 ANTIGEN (MDCK CELL DERIVED, PROPIOLACTONE INACTIVATED), INFLUENZA B VIRUS B/IOWA/06/2017 ANTIGEN (MDCK CELL DERIVED, PROPIOLACTONE INACTIVATED) 15; 15; 15; 15 UG/.5ML; UG/.5ML; UG/.5ML; UG/.5ML
0.5 INJECTION, SUSPENSION INTRAMUSCULAR ONCE
Refills: 0 | Status: DISCONTINUED | OUTPATIENT
Start: 2025-03-31 | End: 2025-04-11

## 2025-03-31 RX ADMIN — DEXAMETHASONE 4 MILLIGRAM(S): 0.5 TABLET ORAL at 18:29

## 2025-03-31 RX ADMIN — LEVETIRACETAM 500 MILLIGRAM(S): 10 INJECTION, SOLUTION INTRAVENOUS at 18:29

## 2025-03-31 RX ADMIN — DEXAMETHASONE 100 MILLIGRAM(S): 0.5 TABLET ORAL at 12:49

## 2025-03-31 NOTE — H&P ADULT - ASSESSMENT
89yo F w/ hx of DM2, HTN, OA, spinal stenosis, CKD III, chronic anemia, hyperthyroidism, recently found meningioma with vasogenic edema, compression of the left frontal horn, and slight midline shift on CT head during admssion at  3/2025 for difficulty ambulating. No acute NSurg intervention, started on keppra 250BID for seizure ppx. Pt BIBEMS for AMS. Family at bedside notes patient started to become less verbal around noontime yesterday, baseline AAO X4.  Found blood glucose elevated over 200 was given 3 units of Admelog.  Later in the day blood glucose became less than 90s.  Patient became nonverbal prompting family calling EMS. BGL on presentation 82.      meningioma with vasogenic edema,   - decadron per NS  - mr of brain with contrast    hypoglycemia  - d5wNS  for now  - monitor glucose    Ethics   DNR/DNI per pt and family wishes

## 2025-03-31 NOTE — PATIENT PROFILE ADULT - FALL HARM RISK - HARM RISK INTERVENTIONS

## 2025-03-31 NOTE — ED ADULT NURSE REASSESSMENT NOTE - NS ED NURSE REASSESS COMMENT FT1
Pt family member explained the function of the purewick device and risks/benefits and pt family verbalized understanding and consented to application of purewick. ED RN cleaned pt prior to application, stage 2 pressure injury with avelyn bandage dressing on wound noted on sacral region. As per MD Jacobs, pt ok to be placed on purewick for urine sample collection.

## 2025-03-31 NOTE — H&P ADULT - NSHPLABSRESULTS_GEN_ALL_CORE
LABS:                        7.9    7.50  )-----------( 387      ( 31 Mar 2025 06:38 )             25.5         136  |  101  |  12  ----------------------------<  67[L]  5.2   |  18[L]  |  1.00    Ca    8.7      31 Mar 2025 06:38    TPro  6.2  /  Alb  2.9[L]  /  TBili  0.3  /  DBili  x   /  AST  25  /  ALT  13  /  AlkPhos  74      PT/INR - ( 31 Mar 2025 06:38 )   PT: 12.6 sec;   INR: 1.11 ratio         PTT - ( 31 Mar 2025 06:38 )  PTT:32.1 sec  Urinalysis Basic - ( 31 Mar 2025 07:54 )    Color: Yellow / Appearance: Clear / S.013 / pH: x  Gluc: x / Ketone: Negative mg/dL  / Bili: Negative / Urobili: 1.0 mg/dL   Blood: x / Protein: Trace mg/dL / Nitrite: Negative   Leuk Esterase: Small / RBC: 1 /HPF / WBC 35 /HPF   Sq Epi: x / Non Sq Epi: 3 /HPF / Bacteria: Many /HPF      < from: CT Head No Cont (25 @ 09:23) >    FINDINGS:    Patient is rotated in the gantry, limiting evaluation.    Ill-defined soft tissue mass lesion splaying the falx along the bilateral   frontal lobes, likely intra-axial, left greater than right, with   significant surrounding vasogenic edema, predominantly in the left   frontal lobe with effacement of the frontal horns of the lateral   ventricles, again left greater than right. The mass lesion demonstrates   punctate foci of increased attenuation, suggestive of a small hemorrhagic   process. Mild midline shift to the right of approximately 0.8 cm.    Areas of decreased attenuation in the deep and periventricular white   matter, compatible with chronic small vessel disease. Parenchymal volume   loss resulting in a ex vacuo dilatation appearance of the bilateral   ventricles. The extra-axial spaces and basal cisterns are within normal   limits. No midline shift or mass effect present.    The cranial cervical junction is within normal limits. The sella is not   expanded. No depressed calvarial fracture. Mild mucosal thickening in the   ethmoid air cells. Postoperative changes related to a prior right   mastoidectomy. The remaining right mastoid air cells are opacified. The   visualized orbits are status post cataract surgery. Partially visualized   posterior spinal fusion hardware.    IMPRESSION:    1.  Ill-defined soft tissue mass lesion splaying the falx along the   bilateral frontal lobes with surrounding vasogenic edema. Punctate   attenuating foci within the lesion, suggestive of a small hemorrhagic   process. A primary neoplasm is highly suggested versus metastatic lesion.   Recommend MRI of the brain with contrast for further evaluation.  2.  Small midline shift to the right of approximately 0.8 cm.  3.  Chronic small vessel disease.      < end of copied text >          RADIOLOGY & ADDITIONAL TESTS:

## 2025-03-31 NOTE — ED PROVIDER NOTE - CLINICAL SUMMARY MEDICAL DECISION MAKING FREE TEXT BOX
89yo F w/ hx of DM2, HTN, OA, spinal stenosis, CKD III, chronic anemia, hyperthyroidism, recently found meningioma with vasogenic edema, compression of the left frontal horn, and slight midline shift on CT head during admssion at  3/2025 for difficulty ambulating. No acute NSurg intervention, started on keppra 250BID for seizure ppx. Pt BIBEMS for AMS. Family at bedside notes patient started to become less verbal around noontime yesterday, baseline AAO X4.  Found blood glucose elevated over 200 was given 3 units of Admelog.  Later in the day blood glucose became less than 90s.  Patient became nonverbal prompting family calling EMS. BGL on presentation 82.   vital sign nonactionable, rectal temperature 99.5.   EKG normal sinus rhythm 64, no acute ischemic changes, pathologic Q waves in V1-V3. Patient nonverbal, follows commands moves all 4 extremities pupil equal round reactive. 89yo F w/ hx of DM2, HTN, OA, spinal stenosis, CKD III, chronic anemia, hyperthyroidism, recently found meningioma with vasogenic edema, compression of the left frontal horn, and slight midline shift on CT head during admssion at  3/2025 for difficulty ambulating. No acute NSurg intervention, started on keppra 250BID for seizure ppx. Pt BIBEMS for AMS. Family at bedside notes patient started to become less verbal around noontime yesterday, baseline AAO X4.  Found blood glucose elevated over 200 was given 3 units of Admelog.  Later in the day blood glucose became less than 90s.  Patient became nonverbal prompting family calling EMS. BGL on presentation 82.   vital sign nonactionable, rectal temperature 99.5.   EKG normal sinus rhythm 64, no acute ischemic changes, pathologic Q waves in V1-V3. Patient nonverbal, follows commands moves all 4 extremities pupil equal round reactive. C/f intracranial pathology leading to AMS given hx meningioma w/ vasogenic edema. Will check metabolic causes as well. CTH. FULL code per familt at bedside. Dispo likely admit

## 2025-03-31 NOTE — ED PROVIDER NOTE - EKG/XRAY ADDITIONAL INFORMATION
I, Dr. Reynaldo Dejesus, independently interpreted the : ekg  interp at 634a  nsr rate 64 pr 174 qrs 86 qtc 422  No diagnostic ischemic ekg changes

## 2025-03-31 NOTE — ED PROVIDER NOTE - ATTENDING CONTRIBUTION TO CARE
89F from home here 2/2 AMS (normally aaox3 but currently awake, and alert but oriented x 0) does follow commands. Per family, after lunch, patient was not conversant, BGL 200s and then dropped to 160s. Given 3U short acting insulin. Received 13U lantus in morning. Patient with dec PO since lunch. Hx c/b DM, cerebral mass with edema on keppra 250mg q12h ppx (prior [facility] chart reviewed). Patient just admitted to [facility] for presumed uti (cx contaminated). Patient offers no complaints.    Hemodynamically stable. NAD. AAOx0 (awake alert but not oriented to person, place, time, event). PERRL 3mm, EOMI w/o nystagmus, mildy dehdrated, poor dentition, patient oropharynx,, RRR, +systolic cardiac murmur,. clear lungs, no inc work of breathing. benign abdomen, - rodriguez, no peritoneal signs, no cva ttp. no visible rashes nor deformities, no signs of jaundice, fluid overload, nor anemia. symm calves, no edema. able to range bilateral LE, 4+/5 str bilateral LE, 5/5 str bilateral UE.     AMS in the form of delirium with obj finding of 4+/5 str bilateral LE, with hx c/b DM and cerebral mass with vasogenic edema per [facility] chart, not given steroids at the time, r/o worsening vasogenic edema, obstructive hydrocephalus. Doubt infectious etiology at this time but will screen as mixed-picture (during [facility] admission, patient was discharged aaox4 after abxs). Labs, CTH, cultures. Summary of presentation, physical exam findings, plan, expected turn around time for diagnostics discussed with patient's family at bedside. Agrees.     FULL CODE

## 2025-03-31 NOTE — ED ADULT NURSE NOTE - OBJECTIVE STATEMENT
Pt is 89y female BIBEMS from home c/o AMS. As per EMS report, pt has been having fluctuations with her sugar levels and her mental status for about a day now so family called EMS, family endorses similar symptoms occurred when pt was last here a few weeks ago where she was diagnosed with a UTI and treated w/ abx. EMS report x2 BGL of 96 and 98 in field. PMH Seizures, DM, HTN. Pt is A&Ox0 looking around but nonverbal, breathing is spontaneous and non labored, afebrile rectally to 99.5F. Pressure injury stage 2 noted on sacral region, pt arrives with avelyn bandage on sacral region. Pt placed on CM and continuous pulse ox. Peripheral IV placed by ED RN, 20G in L forearm. Pt is 89y female BIBEMS from home c/o AMS. As per EMS report, pt has been having fluctuations with her sugar levels and her mental status for about a day now so family called EMS, family endorses similar symptoms occurred when pt was last here a few weeks ago where she was diagnosed with a UTI and treated w/ abx. EMS report x2 BGL of 96 and 98 in field. PMH Seizures, DM, HTN. Pt is A&Ox0 looking around but nonverbal, responds to pain, breathing is spontaneous and non labored, afebrile rectally to 99.5F. Pressure injury stage 2 noted on sacral region, pt arrives with avelyn bandage on sacral region. Pt placed on CM and continuous pulse ox. Peripheral IV placed by ED RN, 20G in L forearm. Pt is 89y female BIBEMS from home c/o AMS. As per EMS report, pt has been having fluctuations with her sugar levels and her mental status for about a day now so family called EMS, family endorses similar symptoms occurred when pt was last here a few weeks ago where she was diagnosed with a UTI and treated w/ abx. EMS report x2 BGL of 96 and 98 in field. PMH Seizures, DM2, HTN, OA, hyperthyroidism, CKD. Pt is A&Ox0 looking around but nonverbal, responds to pain, breathing is spontaneous and non labored, afebrile rectally to 99.5F. Pressure injury stage 2 noted on sacral region, pt arrives with avelyn bandage on sacral region. Pt placed on CM and continuous pulse ox. Peripheral IV placed by ED RN, 20G in L forearm.

## 2025-03-31 NOTE — ED PROVIDER NOTE - PHYSICAL EXAMINATION
Vital signs reviewed.  CONSTITUTIONAL: NAD, follows commands but non-verbal   HEAD: Normocephalic; atraumatic  EYES: PERRL   MOUTH/THROAT:  MMM  NECK: Trachea midline, no JVD  CV: Normal S1, S2; systolic murmur   RESP: normal work of breathing; CTAB   ABD: soft, non-distended; non-tender to palpation   : Deferred  MSK/EXT: no LE edema   SKIN: No rashes on exposed skin surfaces  NEURO: withdraw all extremities to pain, non-verbal,

## 2025-03-31 NOTE — H&P ADULT - HISTORY OF PRESENT ILLNESS
89yo F w/ hx of DM2, HTN, OA, spinal stenosis, CKD III, chronic anemia, hyperthyroidism, recently found meningioma with vasogenic edema, compression of the left frontal horn, and slight midline shift on CT head during admssion at  3/2025 for difficulty ambulating. No acute NSurg intervention, started on keppra 250BID for seizure ppx. Pt BIBEMS for AMS. Family at bedside notes patient started to become less verbal around noontime yesterday, baseline AAO X4.  Found blood glucose elevated over 200 was given 3 units of Admelog.  Later in the day blood glucose became less than 90s.  Patient became nonverbal prompting family calling EMS. BGL on presentation 82.

## 2025-03-31 NOTE — CONSULT NOTE ADULT - ASSESSMENT
- Family adamantly opposed to any possible surgical intervention given age.  - Dex 10 now then 4q6  - Keppra 500BID x7d  - MRI w/wo to assess progression of disease for prognostication

## 2025-03-31 NOTE — CONSULT NOTE ADULT - SUBJECTIVE AND OBJECTIVE BOX
Cayden Gordon  89F CKD III w/ known Lt anterior parafalcine meningioma w/ considerable vasogenic edema, now p/w AMS, AOx0 from AOx2-3 per family. CTH w/ stable/ mildly inc vasogenic edema. Exam: EO to stim, Ox0, RAZO spont/ strong.

## 2025-04-01 LAB
GLUCOSE BLDC GLUCOMTR-MCNC: 263 MG/DL — HIGH (ref 70–99)
GLUCOSE BLDC GLUCOMTR-MCNC: 297 MG/DL — HIGH (ref 70–99)
GLUCOSE BLDC GLUCOMTR-MCNC: 299 MG/DL — HIGH (ref 70–99)
GLUCOSE BLDC GLUCOMTR-MCNC: 406 MG/DL — HIGH (ref 70–99)
GLUCOSE BLDC GLUCOMTR-MCNC: 434 MG/DL — HIGH (ref 70–99)

## 2025-04-01 RX ORDER — INSULIN GLARGINE-YFGN 100 [IU]/ML
15 INJECTION, SOLUTION SUBCUTANEOUS AT BEDTIME
Refills: 0 | Status: DISCONTINUED | OUTPATIENT
Start: 2025-04-01 | End: 2025-04-01

## 2025-04-01 RX ORDER — INSULIN GLARGINE-YFGN 100 [IU]/ML
15 INJECTION, SOLUTION SUBCUTANEOUS AT BEDTIME
Refills: 0 | Status: DISCONTINUED | OUTPATIENT
Start: 2025-04-01 | End: 2025-04-04

## 2025-04-01 RX ORDER — INSULIN LISPRO 100 U/ML
4 INJECTION, SOLUTION INTRAVENOUS; SUBCUTANEOUS ONCE
Refills: 0 | Status: COMPLETED | OUTPATIENT
Start: 2025-04-01 | End: 2025-04-01

## 2025-04-01 RX ADMIN — INSULIN LISPRO 1: 100 INJECTION, SOLUTION INTRAVENOUS; SUBCUTANEOUS at 21:21

## 2025-04-01 RX ADMIN — INSULIN LISPRO 3: 100 INJECTION, SOLUTION INTRAVENOUS; SUBCUTANEOUS at 08:46

## 2025-04-01 RX ADMIN — LEVETIRACETAM 500 MILLIGRAM(S): 10 INJECTION, SOLUTION INTRAVENOUS at 17:20

## 2025-04-01 RX ADMIN — INSULIN LISPRO 6: 100 INJECTION, SOLUTION INTRAVENOUS; SUBCUTANEOUS at 16:55

## 2025-04-01 RX ADMIN — INSULIN LISPRO 4 UNIT(S): 100 INJECTION, SOLUTION INTRAVENOUS; SUBCUTANEOUS at 16:55

## 2025-04-01 RX ADMIN — INSULIN GLARGINE-YFGN 15 UNIT(S): 100 INJECTION, SOLUTION SUBCUTANEOUS at 21:22

## 2025-04-01 RX ADMIN — DEXAMETHASONE 4 MILLIGRAM(S): 0.5 TABLET ORAL at 17:20

## 2025-04-01 RX ADMIN — INSULIN LISPRO 3: 100 INJECTION, SOLUTION INTRAVENOUS; SUBCUTANEOUS at 12:10

## 2025-04-01 RX ADMIN — LEVETIRACETAM 500 MILLIGRAM(S): 10 INJECTION, SOLUTION INTRAVENOUS at 06:14

## 2025-04-01 RX ADMIN — DEXAMETHASONE 4 MILLIGRAM(S): 0.5 TABLET ORAL at 00:31

## 2025-04-01 RX ADMIN — DEXAMETHASONE 4 MILLIGRAM(S): 0.5 TABLET ORAL at 11:32

## 2025-04-01 RX ADMIN — DEXAMETHASONE 4 MILLIGRAM(S): 0.5 TABLET ORAL at 06:14

## 2025-04-01 RX ADMIN — DEXAMETHASONE 4 MILLIGRAM(S): 0.5 TABLET ORAL at 23:00

## 2025-04-01 NOTE — CHART NOTE - NSCHARTNOTEFT_GEN_A_CORE
89 y/o with brain mass associated with vasogenic edema on IV Decadron now with steroid induced hyperglycemia ( Glucose 200-400 today) Lantus 15 units at bedtime started as d/w Dr. Harden.   In addition Admelog  4 units + 6 units from sliding given for  this evening; endocrine consult called ( Dr. Romero)

## 2025-04-01 NOTE — ADVANCED PRACTICE NURSE CONSULT - REASON FOR CONSULT
Wound consultation to assess skin status; sacrum, suspected deep tissue injury present on admission  HPI: 89yo F w/ hx of DM2, HTN, OA, spinal stenosis, CKD III, chronic anemia, hyperthyroidism, recently found meningioma with vasogenic edema, compression of the left frontal horn, and slight midline shift on CT head during admssion at  3/2025 for difficulty ambulating. No acute NSurg intervention, started on keppra 250BID for seizure ppx. Pt BIBEMS for AMS. Family at bedside notes patient started to become less verbal around noontime yesterday, baseline AAO X4.  Found blood glucose elevated over 200 was given 3 units of Admelog.  Later in the day blood glucose became less than 90s.  Patient became nonverbal prompting family calling EMS. BGL on presentation 82.

## 2025-04-01 NOTE — CHART NOTE - NSCHARTNOTEFT_GEN_A_CORE
- MRI reviewed  - Family adamantly opposed to any possible surgical intervention given age.  - Dex 4q6 can taper to 2BID over 2 weeks  - Keppra 500BID x7d

## 2025-04-01 NOTE — ADVANCED PRACTICE NURSE CONSULT - ASSESSMENT
Arrived on unit, patient was found lying in a low air loss pressure redistribution support surface style bed.  Ms. Gordon was able to turn with some assistance. Patient provided consent to skin examination. Once consent was granted, able to view her skin.   Once turned, able to view her skin. Patient with a Pure Wick external catheter for urinary diversion. Patient is incontinent of stool.     Bilateral sacrum/buttock non-blanchable deep red / purple discoloration, consistent with an evolving deep tissue injury, size approximately 5.0 cm x 8.0 cm x 0.1 cm, present on admission, with an open wound measuring approximately 0.6 cm x 1.0 x 0.1 cm. Cleansed w/ incontinent cleanser, pat dry & applied Triad at bedside.     Bilateral heels hypopigmented. Skin intact. No open wounds or ulcerations.     Patient, and RN were educated on the importance of turning and positioning every 2 hours. The use of waffle cushion when out of bed to chair, and to shift her weight every 2 hours while in chair. The importance of keeping her skin clean and dry and to offload feet/heels, and optimal nutrition.       When the consultation was completed, the patient was left in a right sided position, with side rails up, call bell within reach, and bed in lowest position. Discussed plan of care with Ruben BARNEY).

## 2025-04-01 NOTE — PROGRESS NOTE ADULT - ASSESSMENT
89yo F w/ hx of DM2, HTN, OA, spinal stenosis, CKD III, chronic anemia, hyperthyroidism, recently found meningioma with vasogenic edema, compression of the left frontal horn, and slight midline shift on CT head during admssion at  3/2025 for difficulty ambulating. No acute NSurg intervention, started on keppra 250BID for seizure ppx. Pt BIBEMS for AMS. Family at bedside notes patient started to become less verbal around noontime yesterday, baseline AAO X4.  Found blood glucose elevated over 200 was given 3 units of Admelog.  Later in the day blood glucose became less than 90s.  Patient became nonverbal prompting family calling EMS. BGL on presentation 82.      meningioma with vasogenic edema,   - decadron per NS  - mr of brain with contrast done  - family refused surgery    diabetes  - fs qid  - insulin ss  - hgb a1c    Ethics   DNR/DNI per pt and family wishes

## 2025-04-01 NOTE — PROGRESS NOTE ADULT - SUBJECTIVE AND OBJECTIVE BOX
DATE OF SERVICE: 25 @ 11:24    Patient is a 89y old  Female who presents with a chief complaint of AMS (31 Mar 2025 14:10)      SUBJECTIVE / OVERNIGHT EVENTS:  No chest pain. No shortness of breath. No complaints. No events overnight. more alert today    MEDICATIONS  (STANDING):  dexAMETHasone  Injectable 4 milliGRAM(s) IV Push every 6 hours  dextrose 5%. 1000 milliLiter(s) (100 mL/Hr) IV Continuous <Continuous>  dextrose 5%. 1000 milliLiter(s) (50 mL/Hr) IV Continuous <Continuous>  dextrose 50% Injectable 25 Gram(s) IV Push once  dextrose 50% Injectable 12.5 Gram(s) IV Push once  dextrose 50% Injectable 25 Gram(s) IV Push once  glucagon  Injectable 1 milliGRAM(s) IntraMuscular once  influenza  Vaccine (HIGH DOSE) 0.5 milliLiter(s) IntraMuscular once  insulin lispro (ADMELOG) corrective regimen sliding scale   SubCutaneous three times a day before meals  insulin lispro (ADMELOG) corrective regimen sliding scale   SubCutaneous at bedtime  levETIRAcetam   Injectable 500 milliGRAM(s) IV Push every 12 hours    MEDICATIONS  (PRN):  dextrose Oral Gel 15 Gram(s) Oral once PRN Blood Glucose LESS THAN 70 milliGRAM(s)/deciliter      Vital Signs Last 24 Hrs  T(C): 36.4 (2025 08:45), Max: 37.7 (31 Mar 2025 16:50)  T(F): 97.6 (2025 08:45), Max: 99.8 (31 Mar 2025 16:50)  HR: 74 (2025 08:45) (68 - 91)  BP: 150/72 (2025 08:45) (100/72 - 179/79)  BP(mean): --  RR: 18 (2025 08:45) (17 - 18)  SpO2: 97% (2025 08:45) (96% - 98%)    Parameters below as of 2025 08:45  Patient On (Oxygen Delivery Method): room air      CAPILLARY BLOOD GLUCOSE      POCT Blood Glucose.: 297 mg/dL (2025 08:39)  POCT Blood Glucose.: 248 mg/dL (31 Mar 2025 22:16)  POCT Blood Glucose.: 255 mg/dL (31 Mar 2025 20:55)  POCT Blood Glucose.: 191 mg/dL (31 Mar 2025 17:08)  POCT Blood Glucose.: 154 mg/dL (31 Mar 2025 15:33)    I&O's Summary    31 Mar 2025 07:01  -  2025 07:00  --------------------------------------------------------  IN: 0 mL / OUT: 101 mL / NET: -101 mL        PHYSICAL EXAM:  GENERAL: NAD, well-developed  HEAD:  Atraumatic, Normocephalic  EYES: EOMI, PERRLA, conjunctiva and sclera clear  NECK: Supple, No JVD  CHEST/LUNG: Clear to auscultation bilaterally; No wheeze  HEART: Regular rate and rhythm; No murmurs, rubs, or gallops  ABDOMEN: Soft, Nontender, Nondistended; Bowel sounds present  EXTREMITIES:  2+ Peripheral Pulses, No clubbing, cyanosis, or edema  NEUROLOGY: non-focal  SKIN: No rashes or lesions    LABS:                        7.9    7.50  )-----------( 387      ( 31 Mar 2025 06:38 )             25.5     03-31    136  |  101  |  12  ----------------------------<  67[L]  5.2   |  18[L]  |  1.00    Ca    8.7      31 Mar 2025 06:38    TPro  6.2  /  Alb  2.9[L]  /  TBili  0.3  /  DBili  x   /  AST  25  /  ALT  13  /  AlkPhos  74  03-31    PT/INR - ( 31 Mar 2025 06:38 )   PT: 12.6 sec;   INR: 1.11 ratio         PTT - ( 31 Mar 2025 06:38 )  PTT:32.1 sec      Urinalysis Basic - ( 31 Mar 2025 07:54 )    Color: Yellow / Appearance: Clear / S.013 / pH: x  Gluc: x / Ketone: Negative mg/dL  / Bili: Negative / Urobili: 1.0 mg/dL   Blood: x / Protein: Trace mg/dL / Nitrite: Negative   Leuk Esterase: Small / RBC: 1 /HPF / WBC 35 /HPF   Sq Epi: x / Non Sq Epi: 3 /HPF / Bacteria: Many /HPF    < from: MR Head No Cont (25 @ 23:03) >    INTERPRETATION:  CLINICAL INDICATION: Left anterior parafalcine meningioma      Magnetic resonance imaging of the brain was carried out with transaxial   SPGR, FLAIR, fast spin echo T2 weighted images, axial susceptibility   weighted series, diffusion weighted series and sagittal T1 weighted   series on a 1.5 Anahi magnet.    Comparison is made with the prior brain CT 3/31/2025.     Contrast was not administered due to allergy.    There is a midline anterior parafalcine extra-axial mass measuring 4.8 cm   in AP diameter by 3.1 cm transversely by 4.2 cm in craniocaudal diameter.   The mass demonstrates slightly low signal intensity on T1 and slightly   bright signal intensity on T2-weighted images with significant vasogenic   edema. Although statistically this may represents a meningioma, the   degree of vasogenic edema suggests a possible aggressive or atypical   histology versus other etiologies such as metastatic disease or solitary   fibrous tumor is. There is mass effect on the left lateral ventricle with   mild midline shift to the right. Ventricular and sulcal prominence is   consistent with age-appropriate involutional change. No acute infarcts   are seen.    IMPRESSION: Anterior midline parafalcine mass with significant vasogenic   edema may represent a meningioma or more aggressive neoplasm based on the   degree of edema.      < end of copied text >      RADIOLOGY & ADDITIONAL TESTS:    Imaging Personally Reviewed:    Consultant(s) Notes Reviewed:      Care Discussed with Consultants/Other Providers:

## 2025-04-01 NOTE — ADVANCED PRACTICE NURSE CONSULT - RECOMMEDATIONS
Impression    Urinary incontinence  Fecal incontinence  Sacrum / Buttocks - DTI in evolution, present on admission  B/L Heels - Hypopigmented skin, no open wounds or ulcerations.     1. Sacrum / Buttocks - DTI in evolution, present on admission  1. Cleanse with incontinence cleanser, pat dry, and apply Triad 2 X Daily & PRN Soiling. Monitor for changes.     2. Right and left heel   - Hypopigmented    Elevate heels; ensure that the soles of the feet are not resting on the foot board of the bed.     3 Incontinent management - incontinent cleanser, pads, lisandra care BID     4. Maintain on an alternating air with low air loss surface      5. Turn & reposition every 2 hr; Use positioning pillow to turn and reposition, soft pillow between bony prominences; continue measures to decrease friction/shear/pressure.     6. Nutrition optimization.     7. Place waffle cushion when out of bed to chair.

## 2025-04-02 DIAGNOSIS — I10 ESSENTIAL (PRIMARY) HYPERTENSION: ICD-10-CM

## 2025-04-02 DIAGNOSIS — D64.9 ANEMIA, UNSPECIFIED: ICD-10-CM

## 2025-04-02 DIAGNOSIS — E11.9 TYPE 2 DIABETES MELLITUS WITHOUT COMPLICATIONS: ICD-10-CM

## 2025-04-02 LAB
-  AMPICILLIN: SIGNIFICANT CHANGE UP
-  CIPROFLOXACIN: SIGNIFICANT CHANGE UP
-  LEVOFLOXACIN: SIGNIFICANT CHANGE UP
-  NITROFURANTOIN: SIGNIFICANT CHANGE UP
-  TETRACYCLINE: SIGNIFICANT CHANGE UP
-  VANCOMYCIN: SIGNIFICANT CHANGE UP
A1C WITH ESTIMATED AVERAGE GLUCOSE RESULT: 7.6 % — HIGH (ref 4–5.6)
ANION GAP SERPL CALC-SCNC: 15 MMOL/L — SIGNIFICANT CHANGE UP (ref 5–17)
BUN SERPL-MCNC: 33 MG/DL — HIGH (ref 7–23)
CALCIUM SERPL-MCNC: 9.1 MG/DL — SIGNIFICANT CHANGE UP (ref 8.4–10.5)
CHLORIDE SERPL-SCNC: 99 MMOL/L — SIGNIFICANT CHANGE UP (ref 96–108)
CO2 SERPL-SCNC: 22 MMOL/L — SIGNIFICANT CHANGE UP (ref 22–31)
CREAT SERPL-MCNC: 1.01 MG/DL — SIGNIFICANT CHANGE UP (ref 0.5–1.3)
CULTURE RESULTS: ABNORMAL
EGFR: 52 ML/MIN/1.73M2 — LOW
EGFR: 52 ML/MIN/1.73M2 — LOW
ESTIMATED AVERAGE GLUCOSE: 171 MG/DL — HIGH (ref 68–114)
FOLATE SERPL-MCNC: 8.7 NG/ML — SIGNIFICANT CHANGE UP
GLUCOSE BLDC GLUCOMTR-MCNC: 125 MG/DL — HIGH (ref 70–99)
GLUCOSE BLDC GLUCOMTR-MCNC: 335 MG/DL — HIGH (ref 70–99)
GLUCOSE BLDC GLUCOMTR-MCNC: 346 MG/DL — HIGH (ref 70–99)
GLUCOSE BLDC GLUCOMTR-MCNC: 347 MG/DL — HIGH (ref 70–99)
GLUCOSE SERPL-MCNC: 325 MG/DL — HIGH (ref 70–99)
HCT VFR BLD CALC: 29.9 % — LOW (ref 34.5–45)
HGB BLD-MCNC: 9.5 G/DL — LOW (ref 11.5–15.5)
MAGNESIUM SERPL-MCNC: 1.9 MG/DL — SIGNIFICANT CHANGE UP (ref 1.6–2.6)
MCHC RBC-ENTMCNC: 25.6 PG — LOW (ref 27–34)
MCHC RBC-ENTMCNC: 31.8 G/DL — LOW (ref 32–36)
MCV RBC AUTO: 80.6 FL — SIGNIFICANT CHANGE UP (ref 80–100)
METHOD TYPE: SIGNIFICANT CHANGE UP
NRBC BLD AUTO-RTO: 0 /100 WBCS — SIGNIFICANT CHANGE UP (ref 0–0)
ORGANISM # SPEC MICROSCOPIC CNT: ABNORMAL
ORGANISM # SPEC MICROSCOPIC CNT: ABNORMAL
PHOSPHATE SERPL-MCNC: 2.6 MG/DL — SIGNIFICANT CHANGE UP (ref 2.5–4.5)
PLATELET # BLD AUTO: 495 K/UL — HIGH (ref 150–400)
POTASSIUM SERPL-MCNC: 4.3 MMOL/L — SIGNIFICANT CHANGE UP (ref 3.5–5.3)
POTASSIUM SERPL-SCNC: 4.3 MMOL/L — SIGNIFICANT CHANGE UP (ref 3.5–5.3)
RBC # BLD: 3.71 M/UL — LOW (ref 3.8–5.2)
RBC # FLD: 14.7 % — HIGH (ref 10.3–14.5)
SODIUM SERPL-SCNC: 136 MMOL/L — SIGNIFICANT CHANGE UP (ref 135–145)
SPECIMEN SOURCE: SIGNIFICANT CHANGE UP
TSH SERPL-MCNC: 0.71 UIU/ML — SIGNIFICANT CHANGE UP (ref 0.27–4.2)
VIT B12 SERPL-MCNC: 493 PG/ML — SIGNIFICANT CHANGE UP (ref 232–1245)
WBC # BLD: 11.85 K/UL — HIGH (ref 3.8–10.5)
WBC # FLD AUTO: 11.85 K/UL — HIGH (ref 3.8–10.5)

## 2025-04-02 RX ORDER — CYST/ALA/Q10/PHOS.SER/DHA/BROC 100-20-50
1 POWDER (GRAM) ORAL DAILY
Refills: 0 | Status: DISCONTINUED | OUTPATIENT
Start: 2025-04-02 | End: 2025-04-11

## 2025-04-02 RX ORDER — AMLODIPINE BESYLATE 10 MG/1
10 TABLET ORAL DAILY
Refills: 0 | Status: DISCONTINUED | OUTPATIENT
Start: 2025-04-02 | End: 2025-04-11

## 2025-04-02 RX ORDER — INSULIN LISPRO 100 U/ML
6 INJECTION, SOLUTION INTRAVENOUS; SUBCUTANEOUS
Refills: 0 | Status: DISCONTINUED | OUTPATIENT
Start: 2025-04-02 | End: 2025-04-04

## 2025-04-02 RX ADMIN — INSULIN LISPRO 4: 100 INJECTION, SOLUTION INTRAVENOUS; SUBCUTANEOUS at 08:28

## 2025-04-02 RX ADMIN — AMLODIPINE BESYLATE 10 MILLIGRAM(S): 10 TABLET ORAL at 17:13

## 2025-04-02 RX ADMIN — DEXAMETHASONE 4 MILLIGRAM(S): 0.5 TABLET ORAL at 13:00

## 2025-04-02 RX ADMIN — INSULIN GLARGINE-YFGN 15 UNIT(S): 100 INJECTION, SOLUTION SUBCUTANEOUS at 21:09

## 2025-04-02 RX ADMIN — INSULIN LISPRO 4: 100 INJECTION, SOLUTION INTRAVENOUS; SUBCUTANEOUS at 17:12

## 2025-04-02 RX ADMIN — INSULIN LISPRO 6 UNIT(S): 100 INJECTION, SOLUTION INTRAVENOUS; SUBCUTANEOUS at 13:01

## 2025-04-02 RX ADMIN — INSULIN LISPRO 6 UNIT(S): 100 INJECTION, SOLUTION INTRAVENOUS; SUBCUTANEOUS at 17:12

## 2025-04-02 RX ADMIN — DEXAMETHASONE 4 MILLIGRAM(S): 0.5 TABLET ORAL at 05:14

## 2025-04-02 RX ADMIN — INSULIN LISPRO 4: 100 INJECTION, SOLUTION INTRAVENOUS; SUBCUTANEOUS at 13:00

## 2025-04-02 RX ADMIN — LEVETIRACETAM 500 MILLIGRAM(S): 10 INJECTION, SOLUTION INTRAVENOUS at 17:13

## 2025-04-02 RX ADMIN — LEVETIRACETAM 500 MILLIGRAM(S): 10 INJECTION, SOLUTION INTRAVENOUS at 05:14

## 2025-04-02 RX ADMIN — DEXAMETHASONE 4 MILLIGRAM(S): 0.5 TABLET ORAL at 17:13

## 2025-04-02 RX ADMIN — DEXAMETHASONE 4 MILLIGRAM(S): 0.5 TABLET ORAL at 23:07

## 2025-04-02 NOTE — PHYSICAL THERAPY INITIAL EVALUATION ADULT - ADDITIONAL COMMENTS
Pt resides with her daughter in a private home with +6 steps to enter. PTA, pt required 1-2 person assistance for all mobility/ADLs. Per pt's granddaughter, pt ambulates short distances (within bedroom) with assistance. Pt owns hospital bed, RW, commode

## 2025-04-02 NOTE — DIETITIAN INITIAL EVALUATION ADULT - PERSON TAUGHT/METHOD
granddaughter/provided diet education on benefit of oral nutrition supplements to promote PO intake and healing of skin impairments. Discussed importance of adequate protein intake. Provided diet education for Carbohydrate Counting for People with Diabetes and Plate Method for Diabetes. Discussed importance of proper portion sizes for carbohydrate containing foods, and importance of eating balanced meals/snacks.  Estimated good understanding of education provided. Pt's granddaughter agreeable to oral nutrition supplements. She was made aware RD remains available and expressed understanding.

## 2025-04-02 NOTE — DIETITIAN INITIAL EVALUATION ADULT - REASON FOR ADMISSION
Altered mental status    per H&P: "89yo F w/ hx of DM2, HTN, OA, spinal stenosis, CKD III, chronic anemia, hyperthyroidism, recently found meningioma with vasogenic edema, compression of the left frontal horn, and slight midline shift on CT head during admssion at  3/2025 for difficulty ambulating. No acute NSurg intervention, started on keppra 250BID for seizure ppx. Pt BIBEMS for AMS"

## 2025-04-02 NOTE — CONSULT NOTE ADULT - ASSESSMENT
Suzie Dawkins  [address]  [place] [address]      Dear Suzie Dawkins,     I work with [facility] Outpatient Care Management Department. We received a referral to call you to discuss your medical history. These services are free of charge and are offered to [facility] patients who have recently been discharged from any of our facilities or who have medical conditions that may require the skill of a nurse to assist with management.     I am a Registered Nurse who specializes in connecting patients with available medical and financial resources as well as addressing any educational needs that may be indicated.    I attempted to reach you by telephone, but I was unsuccessful. Please call our department so that we can go over some questions with you, regarding your health.    The Outpatient Care Management Department can be reached at [phone], from 8:00AM to 4:30 PM, on Monday thru Friday.     Additionally, [facility] also has a program where a nurse is available 24/7 to answer questions or provide medical advice, their number is [phone].      Thanks,    Bre Merritt RN [facility]   Outpatient Care Management  Phone #: [phone]    89yo F w/ hx of DM2, HTN, OA, spinal stenosis, CKD III, chronic anemia, hyperthyroidism, recently found meningioma with vasogenic edema, compression of the left frontal horn, and slight midline shift on CT head during admssion at  3/2025 for difficulty ambulating. No acute NSurg intervention, started on keppra 250BID for seizure ppx. Pt BIBEMS for AMS.      Assessment  DMT2: 89y Female with DM T2 with hyperglycemia, A1C 8.3% , was on oral meds and insulin at home, now on basal bolus insulin with coverage, blood sugars running high while on decadron steroids.   Hyperthyroid: On MMI 5 mg daily, TFTs pending.   HTN: on antihypertensive medications, monitored, asymptomatic.      Discussed plan and management wit Dr Nick Romero MD  Cell: [phone]  Office: [phone]             89yo F w/ hx of DM2, HTN, OA, spinal stenosis, CKD III, chronic anemia, hyperthyroidism, recently found meningioma with vasogenic edema, compression of the left frontal horn, and slight midline shift on CT head during admssion at  3/2025 for difficulty ambulating. No acute NSurg intervention, started on keppra 250BID for seizure ppx. Pt BIBEMS for AMS.    Assessment  DMT2: 89y Female with DM T2 with hyperglycemia, A1C 8.3% , was on oral meds and insulin at home, now on basal bolus insulin with coverage, blood sugars running high while on decadron steroids.   Hyperthyroid: On MMI 5 mg daily, TFTs pending.   HTN: on antihypertensive medications, monitored, asymptomatic.      Discussed plan and management wit Dr Nick Romero MD  Cell: [phone]  Office: [phone]

## 2025-04-02 NOTE — PROGRESS NOTE ADULT - ASSESSMENT
89yo F w/ hx of DM2, HTN, OA, spinal stenosis, CKD III, chronic anemia, hyperthyroidism, recently found meningioma with vasogenic edema, compression of the left frontal horn, and slight midline shift on CT head during admssion at  3/2025 for difficulty ambulating. No acute NSurg intervention, started on keppra 250BID for seizure ppx. Pt BIBEMS for AMS. Family at bedside notes patient started to become less verbal around noontime yesterday, baseline AAO X4.  Found blood glucose elevated over 200 was given 3 units of Admelog.  Later in the day blood glucose became less than 90s.  Patient became nonverbal prompting family calling EMS. BGL on presentation 82.      meningioma with vasogenic edema,   - decadron per NS  - mr of brain with contrast done  - family refused surgery    diabetes  - fs qid  - insulin ss  - hgb a1c 7.6  - endo following    Ethics   DNR/DNI per pt and family wishes  - GOC and 30 minutes for advanced care planning discussion separate and in addition to the E&M service provided was discussed with daughter

## 2025-04-02 NOTE — DIETITIAN INITIAL EVALUATION ADULT - PERTINENT LABORATORY DATA
04-02    136  |  99  |  33[H]  ----------------------------<  325[H]  4.3   |  22  |  1.01    Ca    9.1      02 Apr 2025 10:01  Phos  2.6     04-02  Mg     1.9     04-02    POCT Blood Glucose.: 335 mg/dL (04-02-25 @ 08:19)  A1C with Estimated Average Glucose Result: 8.3 % (03-19-25 @ 05:30)

## 2025-04-02 NOTE — DIETITIAN INITIAL EVALUATION ADULT - NSFNSPHYEXAMSKINFT_GEN_A_CORE
per flowsheets:  - Pressure Injury 1: Bilateral:, buttock/sacrum, Suspected deep tissue injury  per wound care note 4/1: "Sacrum / Buttocks - DTI in evolution, present on admission  B/L Heels - Hypopigmented skin, no open wounds or ulcerations."

## 2025-04-02 NOTE — PROGRESS NOTE ADULT - SUBJECTIVE AND OBJECTIVE BOX
DATE OF SERVICE: 04-02-25 @ 21:55    Patient is a 89y old  Female who presents with a chief complaint of AMS (02 Apr 2025 16:04)      SUBJECTIVE / OVERNIGHT EVENTS:  more alert and responsive.  moderate po intake    MEDICATIONS  (STANDING):  amLODIPine   Tablet 10 milliGRAM(s) Oral daily  ascorbic acid 500 milliGRAM(s) Oral daily  dexAMETHasone  Injectable 4 milliGRAM(s) IV Push every 6 hours  dextrose 5%. 1000 milliLiter(s) (100 mL/Hr) IV Continuous <Continuous>  dextrose 5%. 1000 milliLiter(s) (50 mL/Hr) IV Continuous <Continuous>  dextrose 50% Injectable 25 Gram(s) IV Push once  dextrose 50% Injectable 12.5 Gram(s) IV Push once  dextrose 50% Injectable 25 Gram(s) IV Push once  glucagon  Injectable 1 milliGRAM(s) IntraMuscular once  influenza  Vaccine (HIGH DOSE) 0.5 milliLiter(s) IntraMuscular once  insulin glargine Injectable (LANTUS) 15 Unit(s) SubCutaneous at bedtime  insulin lispro (ADMELOG) corrective regimen sliding scale   SubCutaneous three times a day before meals  insulin lispro (ADMELOG) corrective regimen sliding scale   SubCutaneous at bedtime  insulin lispro Injectable (ADMELOG) 6 Unit(s) SubCutaneous three times a day before meals  levETIRAcetam   Injectable 500 milliGRAM(s) IV Push every 12 hours  methimazole 5 milliGRAM(s) Oral daily  multivitamin/minerals 1 Tablet(s) Oral daily    MEDICATIONS  (PRN):  dextrose Oral Gel 15 Gram(s) Oral once PRN Blood Glucose LESS THAN 70 milliGRAM(s)/deciliter      Vital Signs Last 24 Hrs  T(C): 36.4 (02 Apr 2025 20:06), Max: 36.9 (02 Apr 2025 01:00)  T(F): 97.5 (02 Apr 2025 20:06), Max: 98.5 (02 Apr 2025 01:00)  HR: 65 (02 Apr 2025 20:06) (59 - 74)  BP: 150/83 (02 Apr 2025 20:06) (150/83 - 182/79)  BP(mean): --  RR: 18 (02 Apr 2025 20:06) (18 - 18)  SpO2: 97% (02 Apr 2025 20:06) (96% - 98%)    Parameters below as of 02 Apr 2025 20:06  Patient On (Oxygen Delivery Method): room air      CAPILLARY BLOOD GLUCOSE      POCT Blood Glucose.: 125 mg/dL (02 Apr 2025 21:07)  POCT Blood Glucose.: 347 mg/dL (02 Apr 2025 16:40)  POCT Blood Glucose.: 346 mg/dL (02 Apr 2025 12:46)  POCT Blood Glucose.: 335 mg/dL (02 Apr 2025 08:19)    I&O's Summary    01 Apr 2025 07:01  -  02 Apr 2025 07:00  --------------------------------------------------------  IN: 0 mL / OUT: 400 mL / NET: -400 mL    02 Apr 2025 07:01  -  02 Apr 2025 21:55  --------------------------------------------------------  IN: 0 mL / OUT: 200 mL / NET: -200 mL        PHYSICAL EXAM:  GENERAL: NAD, well-developed  HEAD:  Atraumatic, Normocephalic  EYES: EOMI, PERRLA, conjunctiva and sclera clear  NECK: Supple, No JVD  CHEST/LUNG: Clear to auscultation bilaterally; No wheeze  HEART: Regular rate and rhythm; No murmurs, rubs, or gallops  ABDOMEN: Soft, Nontender, Nondistended; Bowel sounds present  EXTREMITIES:  2+ Peripheral Pulses, No clubbing, cyanosis, or edema  PSYCH: AAOx3  NEUROLOGY: non-focal  SKIN: No rashes or lesions    LABS:                        9.5    11.85 )-----------( 495      ( 02 Apr 2025 10:01 )             29.9     04-02    136  |  99  |  33[H]  ----------------------------<  325[H]  4.3   |  22  |  1.01    Ca    9.1      02 Apr 2025 10:01  Phos  2.6     04-02  Mg     1.9     04-02      A1C with Estimated Average Glucose (04.02.25 @ 10:02)   A1C with Estimated Average Glucose Result: 7.6: Method: Immunoassay       Urinalysis Basic - ( 02 Apr 2025 10:01 )    Color: x / Appearance: x / SG: x / pH: x  Gluc: 325 mg/dL / Ketone: x  / Bili: x / Urobili: x   Blood: x / Protein: x / Nitrite: x   Leuk Esterase: x / RBC: x / WBC x   Sq Epi: x / Non Sq Epi: x / Bacteria: x        RADIOLOGY & ADDITIONAL TESTS:    Imaging Personally Reviewed:    Consultant(s) Notes Reviewed:      Care Discussed with Consultants/Other Providers:

## 2025-04-02 NOTE — DIETITIAN INITIAL EVALUATION ADULT - OTHER CALCULATIONS
defer fluid needs to medical team discretion  Estimated protein-energy needs calculated using dosing weight with consideration for wound healing

## 2025-04-02 NOTE — DIETITIAN INITIAL EVALUATION ADULT - NSFNSADHERENCEPTAFT_GEN_A_CORE
PMH DM endorsed per granddaughter. Pt takes insulin at home, confirmed per home medication list. She has her fingersticks checked regularly.  labs from 3/31 show hypoglycemia  hyperglycemia noted per fingersticks, insulin and steroids ordered in house  HbA1c 8.3% (3/19/25)  PMH DM endorsed per granddaughter. Pt takes insulin at home, confirmed per home medication list. She has her fingersticks checked at home.  labs from 3/31 show hypoglycemia  hyperglycemia noted per fingersticks, insulin and steroids ordered in house  HbA1c 8.3% (3/19/25)

## 2025-04-02 NOTE — DIETITIAN INITIAL EVALUATION ADULT - OTHER INFO
Weights:  - UBW (per granddaughter): estimated around 150 pounds, denied weight changes PTA  - Dosing Weight (per chart): 140.4 pounds (3/31)   - IBW based on granddaughter's reported height of 5'6'' : 130 pounds +/- 10%   - Per [facility] HIE: 145 pounds (3/15/25), height 5'6''   RD to continue to monitor weights and trends as able.

## 2025-04-02 NOTE — DIETITIAN INITIAL EVALUATION ADULT - REASON INDICATOR FOR ASSESSMENT
RD consult warranted for pressure injury stage 2 or greater, MST score 2 or greater   Source: Patient visited, granddaughter at bedside, Electronic Medical Record  Chart reviewed, events noted.

## 2025-04-02 NOTE — DIETITIAN INITIAL EVALUATION ADULT - ADD RECOMMEND
1. Continue Consistent Carbohydrate therapeutic diet restriction. Defer diet texture/consistency to team/SLP   2. Recommend Ensure Max once daily to provide 160 kcal, 30 grams protein, 1 gram sugar per 10 oz   3. Recommend adding Multivitamin, ascorbic acid pending no medical contraindications, to promote wound healing.  4. Monitor PO intake, PO diet tolerance, skin, weight, nutrition related labs, GI function, goals of care

## 2025-04-02 NOTE — DIETITIAN INITIAL EVALUATION ADULT - PERTINENT MEDS FT
MEDICATIONS  (STANDING):  dexAMETHasone  Injectable 4 milliGRAM(s) IV Push every 6 hours  dextrose 5%. 1000 milliLiter(s) (100 mL/Hr) IV Continuous <Continuous>  dextrose 5%. 1000 milliLiter(s) (50 mL/Hr) IV Continuous <Continuous>  dextrose 50% Injectable 25 Gram(s) IV Push once  dextrose 50% Injectable 12.5 Gram(s) IV Push once  dextrose 50% Injectable 25 Gram(s) IV Push once  glucagon  Injectable 1 milliGRAM(s) IntraMuscular once  influenza  Vaccine (HIGH DOSE) 0.5 milliLiter(s) IntraMuscular once  insulin glargine Injectable (LANTUS) 15 Unit(s) SubCutaneous at bedtime  insulin lispro (ADMELOG) corrective regimen sliding scale   SubCutaneous three times a day before meals  insulin lispro (ADMELOG) corrective regimen sliding scale   SubCutaneous at bedtime  insulin lispro Injectable (ADMELOG) 6 Unit(s) SubCutaneous three times a day before meals  levETIRAcetam   Injectable 500 milliGRAM(s) IV Push every 12 hours  methimazole 5 milliGRAM(s) Oral daily    MEDICATIONS  (PRN):  dextrose Oral Gel 15 Gram(s) Oral once PRN Blood Glucose LESS THAN 70 milliGRAM(s)/deciliter

## 2025-04-02 NOTE — DIETITIAN INITIAL EVALUATION ADULT - ORAL INTAKE PTA/DIET HISTORY
Pt visited, Pt did not respond to questions asked, spoke to granddaughter at bedside for information. Granddaughter reports Pt typically has a good appetite and PO intake PTA. Pt with poor intake day of admission. Follows healthful diet, limited sugar, no added sugar diet. Denies any known food allergies or intolerances. Denies any micronutrient supplementation at home. Denied oral nutrition supplements, reported trying Glucerna supplements in the past. Denies any difficulty chewing/swallowing at this time with current restrictions, has puree foods at home.

## 2025-04-02 NOTE — DIETITIAN INITIAL EVALUATION ADULT - NSFNSGIIOFT_GEN_A_CORE
Pt denies nausea, vomiting, diarrhea, constipation at time of visit. Reported diarrhea a few days ago related to medication use per granddaughter.

## 2025-04-02 NOTE — PHYSICAL THERAPY INITIAL EVALUATION ADULT - TRANSFER TRAINING, PT EVAL
GOAL: Patient will perform sit to stand transfers with modAx1 at rolling walker with proper hand placement in 2 weeks

## 2025-04-02 NOTE — PHYSICAL THERAPY INITIAL EVALUATION ADULT - PERTINENT HX OF CURRENT PROBLEM, REHAB EVAL
89yo F w/ hx of DM2, HTN, OA, spinal stenosis, CKD III, chronic anemia, hyperthyroidism, recently found meningioma with vasogenic edema, compression of the left frontal horn, and slight midline shift on CT head during admssion at  3/2025 for difficulty ambulating. No acute NSurg intervention, started on keppra 250BID for seizure ppx. Pt BIBEMS for AMS. Family at bedside notes patient started to become less verbal around noontime yesterday, baseline AAO X4.  Found blood glucose elevated over 200 was given 3 units of Admelog.  Later in the day blood glucose became less than 90s.  Patient became nonverbal prompting family calling EMS. BGL on presentation 82. Hosp course: XR chest (3/31) Elevated right hemidiaphragm with patchy opacity likely atelectasis. Elevated right hemidiaphragm. CT head (3/31)    Ill-defined soft tissue mass lesion splaying the falx along the bilateral frontal lobes with surrounding vasogenic edema. Punctate attenuating foci within the lesion, suggestive of a small hemorrhagic process. A primary neoplasm is highly suggested versus metastatic lesion. Small midline shift to the right of approximately 0.8 cm. Chronic small vessel disease. MRI head (3/31) Anterior midline parafalcine mass with significant vasogenic edema may represent a meningioma or more aggressive neoplasm based on the degree of edema.

## 2025-04-02 NOTE — CONSULT NOTE ADULT - SUBJECTIVE AND OBJECTIVE BOX
HPI:  89yo F w/ hx of DM2, HTN, OA, spinal stenosis, CKD III, chronic anemia, hyperthyroidism, recently found meningioma with vasogenic edema, compression of the left frontal horn, and slight midline shift on CT head during admssion at  3/2025 for difficulty ambulating. No acute NSurg intervention, started on keppra 250BID for seizure ppx. Pt BIBEMS for AMS. Family at bedside notes patient started to become less verbal around noontime yesterday, baseline AAO X4.  Found blood glucose elevated over 200 was given 3 units of Admelog.  Later in the day blood glucose became less than 90s.  Patient became nonverbal prompting family calling EMS. BGL on presentation 82.   (31 Mar 2025 13:22)      Patient has history of diabetes, A1C  8.3 % on home insulins.   Endo was consulted for glycemic control.      PAST MEDICAL & SURGICAL HISTORY:  Anemia      Anxiety      Arthritis      Cataract      Cervical spinal stenosis      DM (diabetes mellitus)      HLD (hyperlipidemia)      HTN (hypertension)      Diabetes      Hypertension          FAMILY HISTORY:      Social History:  no tob  no etoh (31 Mar 2025 13:22)          HOME MEDICATIONS:  Home Medications:  ALPRAZolam 0.25 mg oral tablet: 1 tab(s) orally as needed for anxiety (31 Mar 2025 15:37)  amLODIPine 10 mg oral tablet: 1 tab(s) orally once a day (31 Mar 2025 15:37)  amLODIPine 10 mg oral tablet: 1 tab(s) orally once a day (16 Mar 2025 04:08)  atorvastatin 10 mg oral tablet: 1 tab(s) orally once a day (16 Mar 2025 04:08)  atorvastatin 10 mg oral tablet: 1 tab(s) orally once a day (31 Mar 2025 15:37)  baclofen 10 mg oral tablet: 1 tab(s) orally as needed (31 Mar 2025 15:37)  Basaglar KwikPen 100 units/mL subcutaneous solution: 10 unit(s) subcutaneous once a day (20 Mar 2025 11:40)  hydrALAZINE 50 mg oral tablet: 1 tab(s) orally 2 times a day (16 Mar 2025 04:09)  hydrALAZINE 50 mg oral tablet: 1 tab(s) orally 2 times a day (with meals) (31 Mar 2025 15:37)  Januvia 50 mg oral tablet: 1 tab(s) orally once a day (31 Mar 2025 15:37)  Lantus Solostar Pen 100 units/mL subcutaneous solution: Inject 12- 13 units subcutaneous depending on sugar once a day in the morning (31 Mar 2025 15:37)  levETIRAcetam 500 mg oral tablet: 0.5 tab(s) orally 2 times a day 250mg (31 Mar 2025 15:37)  methIMAzole 5 mg oral tablet: 1 tab(s) orally once a day (31 Mar 2025 15:37)  methIMAzole 5 mg oral tablet: 1 tab(s) orally once a day (16 Mar 2025 04:09)  NovoLOG FlexPen 100 units/mL injectable solution: 2 unit(s) injectable 3 times a day (16 Mar 2025 04:09)  NovoLOG FlexPen 100 units/mL injectable solution: Inject Per sliding scale, if &gt;140 2 units, 180, 4 units subcutaneous 3 times a day (31 Mar 2025 15:37)  oxycodone-acetaminophen 5 mg-325 mg oral tablet: 1 tab(s) orally every 6 hours as needed for pain (31 Mar 2025 15:37)          MEDICATIONS  (STANDING):  dexAMETHasone  Injectable 4 milliGRAM(s) IV Push every 6 hours  dextrose 5%. 1000 milliLiter(s) (100 mL/Hr) IV Continuous <Continuous>  dextrose 5%. 1000 milliLiter(s) (50 mL/Hr) IV Continuous <Continuous>  dextrose 50% Injectable 25 Gram(s) IV Push once  dextrose 50% Injectable 12.5 Gram(s) IV Push once  dextrose 50% Injectable 25 Gram(s) IV Push once  glucagon  Injectable 1 milliGRAM(s) IntraMuscular once  influenza  Vaccine (HIGH DOSE) 0.5 milliLiter(s) IntraMuscular once  insulin glargine Injectable (LANTUS) 15 Unit(s) SubCutaneous at bedtime  insulin lispro (ADMELOG) corrective regimen sliding scale   SubCutaneous three times a day before meals  insulin lispro (ADMELOG) corrective regimen sliding scale   SubCutaneous at bedtime  insulin lispro Injectable (ADMELOG) 6 Unit(s) SubCutaneous three times a day before meals  levETIRAcetam   Injectable 500 milliGRAM(s) IV Push every 12 hours  methimazole 5 milliGRAM(s) Oral daily    MEDICATIONS  (PRN):  dextrose Oral Gel 15 Gram(s) Oral once PRN Blood Glucose LESS THAN 70 milliGRAM(s)/deciliter      Allergies    aspirin (Unknown)  IV Contrast (Anaphylaxis)  codeine (Unknown)  Aspir 81 (Anaphylaxis)    Intolerances        Review of Systems:  Neuro: No HA, no dizziness  Cardiovascular: No chest pain, no palpitations  Respiratory: no SOB, no cough  GI: No nausea, vomiting, abdominal pain  MSK: Denies joint/muscle pain      ALL OTHER SYSTEMS REVIEWED AND NEGATIVE        PHYSICAL EXAM:  VITALS: T(C): 36.5 (04-02-25 @ 10:30)  T(F): 97.7 (04-02-25 @ 10:30), Max: 98.5 (04-02-25 @ 01:00)  HR: 59 (04-02-25 @ 10:30) (59 - 74)  BP: 182/79 (04-02-25 @ 10:30) (155/73 - 182/79)  RR:  (18 - 18)  SpO2:  (97% - 98%)  Wt(kg): --  GENERAL: NAD, well-groomed, well-developed  NEURO:  alert and oriented  RESPIRATORY: Clear to auscultation bilaterally; No rales, rhonchi, wheezing  CARDIOVASCULAR: Si S2  GI: Soft, non distended, normal bowel sounds  MUSCULOSKELETAL: Moves all extremities equally       POCT Blood Glucose.: 346 mg/dL (04-02-25 @ 12:46)  POCT Blood Glucose.: 335 mg/dL (04-02-25 @ 08:19)  POCT Blood Glucose.: 299 mg/dL (04-01-25 @ 21:16)  POCT Blood Glucose.: 434 mg/dL (04-01-25 @ 16:41)  POCT Blood Glucose.: 406 mg/dL (04-01-25 @ 16:40)  POCT Blood Glucose.: 263 mg/dL (04-01-25 @ 12:03)  POCT Blood Glucose.: 297 mg/dL (04-01-25 @ 08:39)  POCT Blood Glucose.: 248 mg/dL (03-31-25 @ 22:16)  POCT Blood Glucose.: 255 mg/dL (03-31-25 @ 20:55)  POCT Blood Glucose.: 191 mg/dL (03-31-25 @ 17:08)  POCT Blood Glucose.: 154 mg/dL (03-31-25 @ 15:33)  POCT Blood Glucose.: 78 mg/dL (03-31-25 @ 07:40)  POCT Blood Glucose.: 82 mg/dL (03-31-25 @ 06:17)                            9.5    11.85 )-----------( 495      ( 02 Apr 2025 10:01 )             29.9       04-02    136  |  99  |  33[H]  ----------------------------<  325[H]  4.3   |  22  |  1.01    eGFR: 52[L]    Ca    9.1      04-02  Mg     1.9     04-02  Phos  2.6     04-02    TPro  6.2  /  Alb  2.9[L]  /  TBili  0.3  /  DBili  x   /  AST  25  /  ALT  13  /  AlkPhos  74  03-31      Thyroid Function Tests:    Diet, Pureed:   Consistent Carbohydrate No Snacks (CSTCHO) (04-01-25 @ 18:52) [Active]          A1C with Estimated Average Glucose Result: 8.3 % (03-19-25 @ 05:30)                   HPI:  89yo F w/ hx of DM2, HTN, OA, spinal stenosis, CKD III, chronic anemia, hyperthyroidism, recently found meningioma with vasogenic edema, compression of the left frontal horn, and slight midline shift on CT head during admssion at  3/2025 for difficulty ambulating. No acute NSurg intervention, started on keppra 250BID for seizure ppx. Pt BIBEMS for AMS. Family at bedside notes patient started to become less verbal around noontime yesterday, baseline AAO X4.  Found blood glucose elevated over 200 was given 3 units of Admelog.  Later in the day blood glucose became less than 90s.  Patient became nonverbal prompting family calling EMS. BGL on presentation 82.   (31 Mar 2025 13:22)      Patient has history of diabetes, A1C  8.3 % on home insulins.   Endo was consulted for glycemic control.      PAST MEDICAL & SURGICAL HISTORY:  Anemia      Anxiety      Arthritis      Cataract      Cervical spinal stenosis      DM (diabetes mellitus)      HLD (hyperlipidemia)      HTN (hypertension)      Diabetes      Hypertension          FAMILY HISTORY:      Social History:  no tob  no etoh (31 Mar 2025 13:22)          HOME MEDICATIONS:  Home Medications:  ALPRAZolam 0.25 mg oral tablet: 1 tab(s) orally as needed for anxiety (31 Mar 2025 15:37)  amLODIPine 10 mg oral tablet: 1 tab(s) orally once a day (31 Mar 2025 15:37)  amLODIPine 10 mg oral tablet: 1 tab(s) orally once a day (16 Mar 2025 04:08)  atorvastatin 10 mg oral tablet: 1 tab(s) orally once a day (16 Mar 2025 04:08)  atorvastatin 10 mg oral tablet: 1 tab(s) orally once a day (31 Mar 2025 15:37)  baclofen 10 mg oral tablet: 1 tab(s) orally as needed (31 Mar 2025 15:37)  Basaglar KwikPen 100 units/mL subcutaneous solution: 10 unit(s) subcutaneous once a day (20 Mar 2025 11:40)  hydrALAZINE 50 mg oral tablet: 1 tab(s) orally 2 times a day (16 Mar 2025 04:09)  hydrALAZINE 50 mg oral tablet: 1 tab(s) orally 2 times a day (with meals) (31 Mar 2025 15:37)  Januvia 50 mg oral tablet: 1 tab(s) orally once a day (31 Mar 2025 15:37)  Lantus Solostar Pen 100 units/mL subcutaneous solution: Inject 12- 13 units subcutaneous depending on sugar once a day in the morning (31 Mar 2025 15:37)  levETIRAcetam 500 mg oral tablet: 0.5 tab(s) orally 2 times a day 250mg (31 Mar 2025 15:37)  methIMAzole 5 mg oral tablet: 1 tab(s) orally once a day (31 Mar 2025 15:37)  methIMAzole 5 mg oral tablet: 1 tab(s) orally once a day (16 Mar 2025 04:09)  NovoLOG FlexPen 100 units/mL injectable solution: 2 unit(s) injectable 3 times a day (16 Mar 2025 04:09)  NovoLOG FlexPen 100 units/mL injectable solution: Inject Per sliding scale, if &gt;140 2 units, 180, 4 units subcutaneous 3 times a day (31 Mar 2025 15:37)  oxycodone-acetaminophen 5 mg-325 mg oral tablet: 1 tab(s) orally every 6 hours as needed for pain (31 Mar 2025 15:37)          MEDICATIONS  (STANDING):  dexAMETHasone  Injectable 4 milliGRAM(s) IV Push every 6 hours  dextrose 5%. 1000 milliLiter(s) (100 mL/Hr) IV Continuous <Continuous>  dextrose 5%. 1000 milliLiter(s) (50 mL/Hr) IV Continuous <Continuous>  dextrose 50% Injectable 25 Gram(s) IV Push once  dextrose 50% Injectable 12.5 Gram(s) IV Push once  dextrose 50% Injectable 25 Gram(s) IV Push once  glucagon  Injectable 1 milliGRAM(s) IntraMuscular once  influenza  Vaccine (HIGH DOSE) 0.5 milliLiter(s) IntraMuscular once  insulin glargine Injectable (LANTUS) 15 Unit(s) SubCutaneous at bedtime  insulin lispro (ADMELOG) corrective regimen sliding scale   SubCutaneous three times a day before meals  insulin lispro (ADMELOG) corrective regimen sliding scale   SubCutaneous at bedtime  insulin lispro Injectable (ADMELOG) 6 Unit(s) SubCutaneous three times a day before meals  levETIRAcetam   Injectable 500 milliGRAM(s) IV Push every 12 hours  methimazole 5 milliGRAM(s) Oral daily    MEDICATIONS  (PRN):  dextrose Oral Gel 15 Gram(s) Oral once PRN Blood Glucose LESS THAN 70 milliGRAM(s)/deciliter      Allergies    aspirin (Unknown)  IV Contrast (Anaphylaxis)  codeine (Unknown)  Aspir 81 (Anaphylaxis)    Intolerances        Review of Systems:  Neuro: No HA, no dizziness  Cardiovascular: No chest pain, no palpitations  Respiratory: no SOB, no cough  GI: No nausea, vomiting, abdominal pain  MSK: Denies joint/muscle pain      ALL OTHER SYSTEMS REVIEWED AND NEGATIVE        PHYSICAL EXAM:  VITALS: T(C): 36.5 (04-02-25 @ 10:30)  T(F): 97.7 (04-02-25 @ 10:30), Max: 98.5 (04-02-25 @ 01:00)  HR: 59 (04-02-25 @ 10:30) (59 - 74)  BP: 182/79 (04-02-25 @ 10:30) (155/73 - 182/79)  RR:  (18 - 18)  SpO2:  (97% - 98%)  Wt(kg): --  GENERAL: NAD, well-groomed, well-developed  NEURO:  alert and oriented  RESPIRATORY: Clear to auscultation bilaterally; No rales, rhonchi, wheezing  CARDIOVASCULAR: Si S2  GI: Soft, non distended, normal bowel sounds  MUSCULOSKELETAL: Moves all extremities equally       POCT Blood Glucose.: 346 mg/dL (04-02-25 @ 12:46)  POCT Blood Glucose.: 335 mg/dL (04-02-25 @ 08:19)  POCT Blood Glucose.: 299 mg/dL (04-01-25 @ 21:16)  POCT Blood Glucose.: 434 mg/dL (04-01-25 @ 16:41)  POCT Blood Glucose.: 406 mg/dL (04-01-25 @ 16:40)  POCT Blood Glucose.: 263 mg/dL (04-01-25 @ 12:03)  POCT Blood Glucose.: 297 mg/dL (04-01-25 @ 08:39)  POCT Blood Glucose.: 248 mg/dL (03-31-25 @ 22:16)  POCT Blood Glucose.: 255 mg/dL (03-31-25 @ 20:55)  POCT Blood Glucose.: 191 mg/dL (03-31-25 @ 17:08)  POCT Blood Glucose.: 154 mg/dL (03-31-25 @ 15:33)  POCT Blood Glucose.: 78 mg/dL (03-31-25 @ 07:40)  POCT Blood Glucose.: 82 mg/dL (03-31-25 @ 06:17)                            9.5    11.85 )-----------( 495      ( 02 Apr 2025 10:01 )             29.9       04-02    136  |  99  |  33[H]  ----------------------------<  325[H]  4.3   |  22  |  1.01    eGFR: 52[L]    Ca    9.1      04-02  Mg     1.9     04-02  Phos  2.6     04-02    TPro  6.2  /  Alb  2.9[L]  /  TBili  0.3  /  DBili  x   /  AST  25  /  ALT  13  /  AlkPhos  74  03-31      Thyroid Function Tests:    Diet, Pureed:   Consistent Carbohydrate No Snacks (CSTCHO) (04-01-25 @ 18:52) [Active]          A1C with Estimated Average Glucose Result: 8.3 % (03-19-25 @ 05:30)

## 2025-04-03 DIAGNOSIS — D32.9 BENIGN NEOPLASM OF MENINGES, UNSPECIFIED: ICD-10-CM

## 2025-04-03 DIAGNOSIS — Z71.89 OTHER SPECIFIED COUNSELING: ICD-10-CM

## 2025-04-03 DIAGNOSIS — R63.0 ANOREXIA: ICD-10-CM

## 2025-04-03 DIAGNOSIS — Z51.5 ENCOUNTER FOR PALLIATIVE CARE: ICD-10-CM

## 2025-04-03 LAB
GLUCOSE BLDC GLUCOMTR-MCNC: 121 MG/DL — HIGH (ref 70–99)
GLUCOSE BLDC GLUCOMTR-MCNC: 193 MG/DL — HIGH (ref 70–99)
GLUCOSE BLDC GLUCOMTR-MCNC: 292 MG/DL — HIGH (ref 70–99)
GLUCOSE BLDC GLUCOMTR-MCNC: 304 MG/DL — HIGH (ref 70–99)
T4 FREE SERPL-MCNC: 1.1 NG/DL — SIGNIFICANT CHANGE UP (ref 0.9–1.7)
TSH SERPL-MCNC: 1.13 UIU/ML — SIGNIFICANT CHANGE UP (ref 0.27–4.2)

## 2025-04-03 RX ADMIN — Medication 500 MILLIGRAM(S): at 12:25

## 2025-04-03 RX ADMIN — DEXAMETHASONE 4 MILLIGRAM(S): 0.5 TABLET ORAL at 17:19

## 2025-04-03 RX ADMIN — AMLODIPINE BESYLATE 10 MILLIGRAM(S): 10 TABLET ORAL at 08:26

## 2025-04-03 RX ADMIN — INSULIN LISPRO 2: 100 INJECTION, SOLUTION INTRAVENOUS; SUBCUTANEOUS at 21:44

## 2025-04-03 RX ADMIN — DEXAMETHASONE 4 MILLIGRAM(S): 0.5 TABLET ORAL at 12:26

## 2025-04-03 RX ADMIN — LEVETIRACETAM 500 MILLIGRAM(S): 10 INJECTION, SOLUTION INTRAVENOUS at 05:00

## 2025-04-03 RX ADMIN — LEVETIRACETAM 500 MILLIGRAM(S): 10 INJECTION, SOLUTION INTRAVENOUS at 17:19

## 2025-04-03 RX ADMIN — INSULIN LISPRO 3: 100 INJECTION, SOLUTION INTRAVENOUS; SUBCUTANEOUS at 17:18

## 2025-04-03 RX ADMIN — DEXAMETHASONE 4 MILLIGRAM(S): 0.5 TABLET ORAL at 23:40

## 2025-04-03 RX ADMIN — Medication 1 TABLET(S): at 12:26

## 2025-04-03 RX ADMIN — DEXAMETHASONE 4 MILLIGRAM(S): 0.5 TABLET ORAL at 04:56

## 2025-04-03 RX ADMIN — INSULIN GLARGINE-YFGN 15 UNIT(S): 100 INJECTION, SOLUTION SUBCUTANEOUS at 21:44

## 2025-04-03 RX ADMIN — INSULIN LISPRO 1: 100 INJECTION, SOLUTION INTRAVENOUS; SUBCUTANEOUS at 12:25

## 2025-04-03 NOTE — CONSULT NOTE ADULT - CONVERSATION DETAILS
GOC note to follow. Met with pts dtr and granddaughter (Penny and Smita) at bedside for greater then 16 mins.  Both have a good understanding of pts medical conditions and hospital course.  They report pt now closer to baseline since blood sugar has been normalized.  PTs dtr Bolivar is HCP and will be at bedside tonight and tomorrow morning.  Bolivar unavailable at present as she is at a class for her job. The family plans to speak as a family regarding PEG placement and code status. We discussed in detail PEG placement and pleasure feeds including risks and benefits.  Also discussed code status.  Questions answered and emotional support provided. Pt remains full code. Palliative care will follow up for [facility].

## 2025-04-03 NOTE — PROGRESS NOTE ADULT - ASSESSMENT
89yo F w/ hx of DM2, HTN, OA, spinal stenosis, CKD III, chronic anemia, hyperthyroidism, recently found meningioma with vasogenic edema, compression of the left frontal horn, and slight midline shift on CT head during admssion at  3/2025 for difficulty ambulating. No acute NSurg intervention, started on keppra 250BID for seizure ppx. Pt BIBEMS for AMS.    Assessment  DMT2: 89y Female with DM T2 with hyperglycemia, A1C 8.3% , was on oral meds and insulin at home, now on basal bolus insulin with coverage, blood sugars running high while on decadron steroids.   Hyperthyroid: On MMI 5 mg daily, TFTs euthyroid .   HTN: on antihypertensive medications, monitored, asymptomatic.      Discussed plan and management wit Dr Nick Romero MD  Cell: [phone]  Office: [phone]             89yo F w/ hx of DM2, HTN, OA, spinal stenosis, CKD III, chronic anemia, hyperthyroidism, recently found meningioma with vasogenic edema, compression of the left frontal horn, and slight midline shift on CT head during admssion at  3/2025 for difficulty ambulating. No acute NSurg intervention, started on keppra 250BID for seizure ppx. Pt BIBEMS for AMS.      Assessment  DMT2: 89y Female with DM T2 with hyperglycemia, A1C 8.3% , was on oral meds and insulin at home, now on basal bolus insulin with coverage, blood sugars running high while on decadron steroids.   Hyperthyroid: On MMI 5 mg daily, TFTs euthyroid .   HTN: on antihypertensive medications, monitored, asymptomatic.      Discussed plan and management wit Dr Nick Romero MD  Cell: [phone]  Office: [phone]

## 2025-04-03 NOTE — CONSULT NOTE ADULT - PROBLEM SELECTOR RECOMMENDATION 9
Will increase Lantus to 15 u at bedtime.  Will increase Admelog to 6 u before each meal and continue Admelog correction scale coverage. Will continue monitoring FS and Follow up.   Patient counseled for compliance with consistent low carb diet and exercise as tolerated outpatient.
pt with decline in appetite with drop in blood sugar after insulin administration at home  endocrine on board  discussed PEG tube with family see GOC note

## 2025-04-03 NOTE — CONSULT NOTE ADULT - ASSESSMENT
89yo F w/ hx of DM2, HTN, OA, spinal stenosis, CKD III, chronic anemia, hyperthyroidism, recently found meningioma with vasogenic edema, compression of the left frontal horn, and slight midline shift on CT head during admssion at  3/2025 for difficulty ambulating. No acute NSurg intervention, started on keppra 250BID for seizure ppx. Pt BIBEMS for AMS. Family at bedside notes patient started to become less verbal  baseline AAO X4.  Found blood glucose elevated over 200 was given 3 units of Admelog.   Patient became nonverbal prompting family calling EMS. BGL on presentation 82.  Palliative care called for [facility]

## 2025-04-03 NOTE — CONSULT NOTE ADULT - TIME BILLING
symptom assessment and management, supportive counseling, coordination of care, discussion and coordination with team.    I personally spent 30 minutes on advance care planning services with the patient. This time is separate and distinct from any other care management services provided on this date.    Brigid Toro, MELISSA-BC  Please contact me via Teams  between 6am-2pm. If not answering, please call the palliative care pager [phone]    After 2pm and on weekends, please see the contact information below:    In the event of newly developing, evolving, or worsening symptoms between 2pm and 5pm please contact the Palliative Medicine via extension [phone] for assistance.  After 5pm please contact team via pager (if the patient is at [facility] #[phone] or if the patient is at [facility] #[phone]) The Geriatric and Palliative Medicine service has coverage 24 hours a day/ 7 days a week to provide medical recommendations regarding symptom management needs via telephone

## 2025-04-03 NOTE — CONSULT NOTE ADULT - PALLIATIVE PERFORMANCE SCALE: SCORE
Lisbeth Ramsey was seen and treated in our emergency department on 2/22/2024.                Diagnosis: Pulpitis/dentalgia    Lisbeth  may return to work on return date.    He may return on this date:          If you have any questions or concerns, please don't hesitate to call.      Triston Velasquez PA-C    ______________________________           _______________          _______________  Hospital Representative                              Date                                Time 40%

## 2025-04-03 NOTE — PROGRESS NOTE ADULT - SUBJECTIVE AND OBJECTIVE BOX
Chief complaint  Patient is a 89y old  Female who presents with a chief complaint of AMS (03 Apr 2025 13:10)         Labs and Fingersticks  CAPILLARY BLOOD GLUCOSE      POCT Blood Glucose.: 193 mg/dL (03 Apr 2025 12:13)  POCT Blood Glucose.: 121 mg/dL (03 Apr 2025 08:13)  POCT Blood Glucose.: 125 mg/dL (02 Apr 2025 21:07)  POCT Blood Glucose.: 347 mg/dL (02 Apr 2025 16:40)      Anion Gap: 15 (04-02 @ 10:01)      Calcium: 9.1 (04-02 @ 10:01)          04-02    136  |  99  |  33[H]  ----------------------------<  325[H]  4.3   |  22  |  1.01    Ca    9.1      02 Apr 2025 10:01  Phos  2.6     04-02  Mg     1.9     04-02                          9.5    11.85 )-----------( 495      ( 02 Apr 2025 10:01 )             29.9     Medications  MEDICATIONS  (STANDING):  amLODIPine   Tablet 10 milliGRAM(s) Oral daily  ascorbic acid 500 milliGRAM(s) Oral daily  dexAMETHasone  Injectable 4 milliGRAM(s) IV Push every 6 hours  dextrose 5%. 1000 milliLiter(s) (50 mL/Hr) IV Continuous <Continuous>  dextrose 5%. 1000 milliLiter(s) (100 mL/Hr) IV Continuous <Continuous>  dextrose 50% Injectable 25 Gram(s) IV Push once  dextrose 50% Injectable 12.5 Gram(s) IV Push once  dextrose 50% Injectable 25 Gram(s) IV Push once  glucagon  Injectable 1 milliGRAM(s) IntraMuscular once  influenza  Vaccine (HIGH DOSE) 0.5 milliLiter(s) IntraMuscular once  insulin glargine Injectable (LANTUS) 15 Unit(s) SubCutaneous at bedtime  insulin lispro (ADMELOG) corrective regimen sliding scale   SubCutaneous three times a day before meals  insulin lispro (ADMELOG) corrective regimen sliding scale   SubCutaneous at bedtime  insulin lispro Injectable (ADMELOG) 6 Unit(s) SubCutaneous three times a day before meals  levETIRAcetam   Injectable 500 milliGRAM(s) IV Push every 12 hours  methimazole 5 milliGRAM(s) Oral daily  multivitamin/minerals 1 Tablet(s) Oral daily      Physical Exam  General: Patient comfortable in bed   Vital Signs Last 12 Hrs  T(F): 97 (04-03-25 @ 12:30), Max: 97.4 (04-03-25 @ 05:32)  HR: 61 (04-03-25 @ 12:30) (60 - 61)  BP: 167/78 (04-03-25 @ 12:30) (161/82 - 167/78)  BP(mean): --  RR: 18 (04-03-25 @ 12:30) (18 - 18)  SpO2: 94% (04-03-25 @ 12:30) (94% - 97%)    CVS: S1S2   Respiratory: No wheezing, no crepitations  GI: Abdomen soft, bowel sounds positive  Musculoskeletal:  moves all extremities  : Voiding        Chief complaint  Patient is a 89y old  Female who presents with a chief complaint of AMS (03 Apr 2025 13:10)         Labs and Fingersticks  CAPILLARY BLOOD GLUCOSE      POCT Blood Glucose.: 193 mg/dL (03 Apr 2025 12:13)  POCT Blood Glucose.: 121 mg/dL (03 Apr 2025 08:13)  POCT Blood Glucose.: 125 mg/dL (02 Apr 2025 21:07)  POCT Blood Glucose.: 347 mg/dL (02 Apr 2025 16:40)      Anion Gap: 15 (04-02 @ 10:01)      Calcium: 9.1 (04-02 @ 10:01)          04-02    136  |  99  |  33[H]  ----------------------------<  325[H]  4.3   |  22  |  1.01    Ca    9.1      02 Apr 2025 10:01  Phos  2.6     04-02  Mg     1.9     04-02                          9.5    11.85 )-----------( 495      ( 02 Apr 2025 10:01 )             29.9     Medications  MEDICATIONS  (STANDING):  amLODIPine   Tablet 10 milliGRAM(s) Oral daily  ascorbic acid 500 milliGRAM(s) Oral daily  dexAMETHasone  Injectable 4 milliGRAM(s) IV Push every 6 hours  dextrose 5%. 1000 milliLiter(s) (50 mL/Hr) IV Continuous <Continuous>  dextrose 5%. 1000 milliLiter(s) (100 mL/Hr) IV Continuous <Continuous>  dextrose 50% Injectable 25 Gram(s) IV Push once  dextrose 50% Injectable 12.5 Gram(s) IV Push once  dextrose 50% Injectable 25 Gram(s) IV Push once  glucagon  Injectable 1 milliGRAM(s) IntraMuscular once  influenza  Vaccine (HIGH DOSE) 0.5 milliLiter(s) IntraMuscular once  insulin glargine Injectable (LANTUS) 15 Unit(s) SubCutaneous at bedtime  insulin lispro (ADMELOG) corrective regimen sliding scale   SubCutaneous three times a day before meals  insulin lispro (ADMELOG) corrective regimen sliding scale   SubCutaneous at bedtime  insulin lispro Injectable (ADMELOG) 6 Unit(s) SubCutaneous three times a day before meals  levETIRAcetam   Injectable 500 milliGRAM(s) IV Push every 12 hours  methimazole 5 milliGRAM(s) Oral daily  multivitamin/minerals 1 Tablet(s) Oral daily      Physical Exam  General: Patient comfortable in bed   Vital Signs Last 12 Hrs  T(F): 97 (04-03-25 @ 12:30), Max: 97.4 (04-03-25 @ 05:32)  HR: 61 (04-03-25 @ 12:30) (60 - 61)  BP: 167/78 (04-03-25 @ 12:30) (161/82 - 167/78)  BP(mean): --  RR: 18 (04-03-25 @ 12:30) (18 - 18)  SpO2: 94% (04-03-25 @ 12:30) (94% - 97%)    CVS: S1S2   Respiratory: No wheezing, no crepitations  GI: Abdomen soft, bowel sounds positive  Musculoskeletal:  moves all extremities  : Voiding

## 2025-04-03 NOTE — PROGRESS NOTE ADULT - ASSESSMENT
89yo F w/ hx of DM2, HTN, OA, spinal stenosis, CKD III, chronic anemia, hyperthyroidism, recently found meningioma with vasogenic edema, compression of the left frontal horn, and slight midline shift on CT head during admssion at  3/2025 for difficulty ambulating. No acute NSurg intervention, started on keppra 250BID for seizure ppx. Pt BIBEMS for AMS. Family at bedside notes patient started to become less verbal around noontime yesterday, baseline AAO X4.  Found blood glucose elevated over 200 was given 3 units of Admelog.  Later in the day blood glucose became less than 90s.  Patient became nonverbal prompting family calling EMS. BGL on presentation 82.      meningioma with vasogenic edema,   - decadron per NS  - mr of brain with contrast done  - family refused surgery    diabetes  - fs qid  - insulin ss  - hgb a1c 7.6  - endo following    Ethics   DNR/DNI per pt and family wishes  - GOC and 30 minutes for advanced care planning discussion separate and in addition to the E&M service provided was discussed with daughter  pt can benefir from peg due to inconsistent po intake and medcation intake.  I spoke with family who are undecided.  - will have palliative care see pt

## 2025-04-03 NOTE — PROGRESS NOTE ADULT - SUBJECTIVE AND OBJECTIVE BOX
DATE OF SERVICE: 04-03-25 @ 13:10    Patient is a 89y old  Female who presents with a chief complaint of AMS (03 Apr 2025 10:23)      SUBJECTIVE / OVERNIGHT EVENTS:  poor speech.  poor po intake    MEDICATIONS  (STANDING):  amLODIPine   Tablet 10 milliGRAM(s) Oral daily  ascorbic acid 500 milliGRAM(s) Oral daily  dexAMETHasone  Injectable 4 milliGRAM(s) IV Push every 6 hours  dextrose 5%. 1000 milliLiter(s) (50 mL/Hr) IV Continuous <Continuous>  dextrose 5%. 1000 milliLiter(s) (100 mL/Hr) IV Continuous <Continuous>  dextrose 50% Injectable 25 Gram(s) IV Push once  dextrose 50% Injectable 12.5 Gram(s) IV Push once  dextrose 50% Injectable 25 Gram(s) IV Push once  glucagon  Injectable 1 milliGRAM(s) IntraMuscular once  influenza  Vaccine (HIGH DOSE) 0.5 milliLiter(s) IntraMuscular once  insulin glargine Injectable (LANTUS) 15 Unit(s) SubCutaneous at bedtime  insulin lispro (ADMELOG) corrective regimen sliding scale   SubCutaneous three times a day before meals  insulin lispro (ADMELOG) corrective regimen sliding scale   SubCutaneous at bedtime  insulin lispro Injectable (ADMELOG) 6 Unit(s) SubCutaneous three times a day before meals  levETIRAcetam   Injectable 500 milliGRAM(s) IV Push every 12 hours  methimazole 5 milliGRAM(s) Oral daily  multivitamin/minerals 1 Tablet(s) Oral daily    MEDICATIONS  (PRN):  dextrose Oral Gel 15 Gram(s) Oral once PRN Blood Glucose LESS THAN 70 milliGRAM(s)/deciliter      Vital Signs Last 24 Hrs  T(C): 36.1 (03 Apr 2025 12:30), Max: 36.5 (03 Apr 2025 00:00)  T(F): 97 (03 Apr 2025 12:30), Max: 97.7 (03 Apr 2025 00:00)  HR: 61 (03 Apr 2025 12:30) (60 - 66)  BP: 167/78 (03 Apr 2025 12:30) (150/83 - 167/78)  BP(mean): --  RR: 18 (03 Apr 2025 12:30) (18 - 18)  SpO2: 94% (03 Apr 2025 12:30) (94% - 99%)    Parameters below as of 03 Apr 2025 12:30  Patient On (Oxygen Delivery Method): room air      CAPILLARY BLOOD GLUCOSE      POCT Blood Glucose.: 193 mg/dL (03 Apr 2025 12:13)  POCT Blood Glucose.: 121 mg/dL (03 Apr 2025 08:13)  POCT Blood Glucose.: 125 mg/dL (02 Apr 2025 21:07)  POCT Blood Glucose.: 347 mg/dL (02 Apr 2025 16:40)    I&O's Summary    02 Apr 2025 07:01  -  03 Apr 2025 07:00  --------------------------------------------------------  IN: 0 mL / OUT: 200 mL / NET: -200 mL        PHYSICAL EXAM:  GENERAL: NAD, well-developed  HEAD:  Atraumatic, Normocephalic  EYES: EOMI, PERRLA, conjunctiva and sclera clear  NECK: Supple, No JVD  CHEST/LUNG: Clear to auscultation bilaterally; No wheeze  HEART: Regular rate and rhythm; No murmurs, rubs, or gallops  ABDOMEN: Soft, Nontender, Nondistended; Bowel sounds present  EXTREMITIES:  2+ Peripheral Pulses, No clubbing, cyanosis, or edema  NEUROLOGY: non-focal  SKIN: No rashes or lesions    LABS:                        9.5    11.85 )-----------( 495      ( 02 Apr 2025 10:01 )             29.9     04-02    136  |  99  |  33[H]  ----------------------------<  325[H]  4.3   |  22  |  1.01    Ca    9.1      02 Apr 2025 10:01  Phos  2.6     04-02  Mg     1.9     04-02            Urinalysis Basic - ( 02 Apr 2025 10:01 )    Color: x / Appearance: x / SG: x / pH: x  Gluc: 325 mg/dL / Ketone: x  / Bili: x / Urobili: x   Blood: x / Protein: x / Nitrite: x   Leuk Esterase: x / RBC: x / WBC x   Sq Epi: x / Non Sq Epi: x / Bacteria: x        RADIOLOGY & ADDITIONAL TESTS:    Imaging Personally Reviewed:    Consultant(s) Notes Reviewed:      Care Discussed with Consultants/Other Providers:

## 2025-04-04 DIAGNOSIS — R63.8 OTHER SYMPTOMS AND SIGNS CONCERNING FOOD AND FLUID INTAKE: ICD-10-CM

## 2025-04-04 LAB
ANION GAP SERPL CALC-SCNC: 14 MMOL/L — SIGNIFICANT CHANGE UP (ref 5–17)
BUN SERPL-MCNC: 30 MG/DL — HIGH (ref 7–23)
CALCIUM SERPL-MCNC: 9.4 MG/DL — SIGNIFICANT CHANGE UP (ref 8.4–10.5)
CHLORIDE SERPL-SCNC: 103 MMOL/L — SIGNIFICANT CHANGE UP (ref 96–108)
CO2 SERPL-SCNC: 23 MMOL/L — SIGNIFICANT CHANGE UP (ref 22–31)
CREAT SERPL-MCNC: 0.89 MG/DL — SIGNIFICANT CHANGE UP (ref 0.5–1.3)
EGFR: 60 ML/MIN/1.73M2 — SIGNIFICANT CHANGE UP
EGFR: 60 ML/MIN/1.73M2 — SIGNIFICANT CHANGE UP
GLUCOSE BLDC GLUCOMTR-MCNC: 171 MG/DL — HIGH (ref 70–99)
GLUCOSE BLDC GLUCOMTR-MCNC: 249 MG/DL — HIGH (ref 70–99)
GLUCOSE BLDC GLUCOMTR-MCNC: 256 MG/DL — HIGH (ref 70–99)
GLUCOSE BLDC GLUCOMTR-MCNC: 301 MG/DL — HIGH (ref 70–99)
GLUCOSE SERPL-MCNC: 232 MG/DL — HIGH (ref 70–99)
HCT VFR BLD CALC: 31.4 % — LOW (ref 34.5–45)
HGB BLD-MCNC: 10.3 G/DL — LOW (ref 11.5–15.5)
MCHC RBC-ENTMCNC: 25.9 PG — LOW (ref 27–34)
MCHC RBC-ENTMCNC: 32.8 G/DL — SIGNIFICANT CHANGE UP (ref 32–36)
MCV RBC AUTO: 78.9 FL — LOW (ref 80–100)
NRBC BLD AUTO-RTO: 0 /100 WBCS — SIGNIFICANT CHANGE UP (ref 0–0)
PLATELET # BLD AUTO: 478 K/UL — HIGH (ref 150–400)
POTASSIUM SERPL-MCNC: 4.3 MMOL/L — SIGNIFICANT CHANGE UP (ref 3.5–5.3)
POTASSIUM SERPL-SCNC: 4.3 MMOL/L — SIGNIFICANT CHANGE UP (ref 3.5–5.3)
RBC # BLD: 3.98 M/UL — SIGNIFICANT CHANGE UP (ref 3.8–5.2)
RBC # FLD: 14.6 % — HIGH (ref 10.3–14.5)
SODIUM SERPL-SCNC: 140 MMOL/L — SIGNIFICANT CHANGE UP (ref 135–145)
WBC # BLD: 8.71 K/UL — SIGNIFICANT CHANGE UP (ref 3.8–10.5)
WBC # FLD AUTO: 8.71 K/UL — SIGNIFICANT CHANGE UP (ref 3.8–10.5)

## 2025-04-04 RX ORDER — INSULIN GLARGINE-YFGN 100 [IU]/ML
17 INJECTION, SOLUTION SUBCUTANEOUS AT BEDTIME
Refills: 0 | Status: DISCONTINUED | OUTPATIENT
Start: 2025-04-04 | End: 2025-04-10

## 2025-04-04 RX ORDER — INSULIN LISPRO 100 U/ML
8 INJECTION, SOLUTION INTRAVENOUS; SUBCUTANEOUS
Refills: 0 | Status: DISCONTINUED | OUTPATIENT
Start: 2025-04-04 | End: 2025-04-10

## 2025-04-04 RX ADMIN — DEXAMETHASONE 4 MILLIGRAM(S): 0.5 TABLET ORAL at 12:16

## 2025-04-04 RX ADMIN — INSULIN LISPRO 8 UNIT(S): 100 INJECTION, SOLUTION INTRAVENOUS; SUBCUTANEOUS at 12:18

## 2025-04-04 RX ADMIN — LEVETIRACETAM 500 MILLIGRAM(S): 10 INJECTION, SOLUTION INTRAVENOUS at 17:32

## 2025-04-04 RX ADMIN — Medication 500 MILLIGRAM(S): at 17:32

## 2025-04-04 RX ADMIN — INSULIN LISPRO 3: 100 INJECTION, SOLUTION INTRAVENOUS; SUBCUTANEOUS at 08:07

## 2025-04-04 RX ADMIN — INSULIN GLARGINE-YFGN 17 UNIT(S): 100 INJECTION, SOLUTION SUBCUTANEOUS at 21:34

## 2025-04-04 RX ADMIN — INSULIN LISPRO 4: 100 INJECTION, SOLUTION INTRAVENOUS; SUBCUTANEOUS at 12:16

## 2025-04-04 RX ADMIN — AMLODIPINE BESYLATE 10 MILLIGRAM(S): 10 TABLET ORAL at 10:15

## 2025-04-04 RX ADMIN — DEXAMETHASONE 4 MILLIGRAM(S): 0.5 TABLET ORAL at 17:32

## 2025-04-04 RX ADMIN — LEVETIRACETAM 500 MILLIGRAM(S): 10 INJECTION, SOLUTION INTRAVENOUS at 05:44

## 2025-04-04 RX ADMIN — DEXAMETHASONE 4 MILLIGRAM(S): 0.5 TABLET ORAL at 05:44

## 2025-04-04 RX ADMIN — Medication 1 TABLET(S): at 12:16

## 2025-04-04 RX ADMIN — INSULIN LISPRO 8 UNIT(S): 100 INJECTION, SOLUTION INTRAVENOUS; SUBCUTANEOUS at 17:33

## 2025-04-04 RX ADMIN — DEXAMETHASONE 4 MILLIGRAM(S): 0.5 TABLET ORAL at 23:01

## 2025-04-04 RX ADMIN — INSULIN LISPRO 2: 100 INJECTION, SOLUTION INTRAVENOUS; SUBCUTANEOUS at 17:33

## 2025-04-04 NOTE — PROGRESS NOTE ADULT - TIME BILLING
This includes chart review, patient assessment, discussion and collaboration with interdisciplinary team members.

## 2025-04-04 NOTE — PROGRESS NOTE ADULT - ASSESSMENT
89yo F w/ hx of DM2, HTN, OA, spinal stenosis, CKD III, chronic anemia, hyperthyroidism, recently found meningioma with vasogenic edema, compression of the left frontal horn, and slight midline shift on CT head during admssion at  3/2025 for difficulty ambulating. No acute NSurg intervention, started on keppra 250BID for seizure ppx. Pt BIBEMS for AMS. Family at bedside notes patient started to become less verbal around noontime yesterday, baseline AAO X4.  Found blood glucose elevated over 200 was given 3 units of Admelog.  Later in the day blood glucose became less than 90s.  Patient became nonverbal prompting family calling EMS. BGL on presentation 82.      meningioma with vasogenic edema,   - decadron per NS  - mr of brain with contrast done  - family refused surgery    diabetes  - fs qid  - insulin ss  - hgb a1c 7.6  - endo following    Ethics   DNR/DNI per pt and family wishes  - GOC and 30 minutes for advanced care planning discussion separate and in addition to the E&M service provided was discussed with daughter  pt can benefit from peg due to inconsistent po intake and medication intake.  I spoke with family who are undecided.  - pt was seen by  palliative care as well

## 2025-04-04 NOTE — PROGRESS NOTE ADULT - SUBJECTIVE AND OBJECTIVE BOX
Indication for Geriatrics and Palliative Care Services/INTERVAL HPI: GOC  SUBJECTIVE AND OBJECTIVE: Pt seen at bedside. Awake but unable to provided meaningful history. Granddaughter present and daughter Penny on phone.    OVERNIGHT EVENTS: No acute events reported.    DNR on chart:  Allergies    aspirin (Unknown)  IV Contrast (Anaphylaxis)  codeine (Unknown)  Aspir 81 (Anaphylaxis)    Intolerances    MEDICATIONS  (STANDING):  amLODIPine   Tablet 10 milliGRAM(s) Oral daily  ascorbic acid 500 milliGRAM(s) Oral daily  dexAMETHasone  Injectable 4 milliGRAM(s) IV Push every 6 hours  dextrose 5%. 1000 milliLiter(s) (100 mL/Hr) IV Continuous <Continuous>  dextrose 5%. 1000 milliLiter(s) (50 mL/Hr) IV Continuous <Continuous>  dextrose 50% Injectable 25 Gram(s) IV Push once  dextrose 50% Injectable 12.5 Gram(s) IV Push once  dextrose 50% Injectable 25 Gram(s) IV Push once  glucagon  Injectable 1 milliGRAM(s) IntraMuscular once  influenza  Vaccine (HIGH DOSE) 0.5 milliLiter(s) IntraMuscular once  insulin glargine Injectable (LANTUS) 17 Unit(s) SubCutaneous at bedtime  insulin lispro (ADMELOG) corrective regimen sliding scale   SubCutaneous three times a day before meals  insulin lispro (ADMELOG) corrective regimen sliding scale   SubCutaneous at bedtime  insulin lispro Injectable (ADMELOG) 8 Unit(s) SubCutaneous three times a day before meals  levETIRAcetam   Injectable 500 milliGRAM(s) IV Push every 12 hours  methimazole 5 milliGRAM(s) Oral daily  multivitamin/minerals 1 Tablet(s) Oral daily    MEDICATIONS  (PRN):  dextrose Oral Gel 15 Gram(s) Oral once PRN Blood Glucose LESS THAN 70 milliGRAM(s)/deciliter      ITEMS UNCHECKED ARE NOT PRESENT    PRESENT SYMPTOMS: [x ]Unable to self-report - see [ ] CPOT [x ] PAINADS [ x] RDOS  Source if other than patient:  [ ]Family   [ ]Team     Pain:  [ ]yes [ ]no  QOL impact -   Location -                    Aggravating factors -  Quality -  Radiation -  Timing-  Severity (0-10 scale):  Minimal acceptable level (0-10 scale):     CPOT:    https://[email or web address]/getattachment/[email or web address]/Critical-Care-Pain-Observation-Tool-(CPOT)    Dyspnea:                           [ ]Mild [ ]Moderate [ ]Severe  Anxiety:                             [ ]Mild [ ]Moderate [ ]Severe  Fatigue:                             [ ]Mild [ ]Moderate [ ]Severe  Nausea:                             [ ]Mild [ ]Moderate [ ]Severe  Loss of appetite:              [ ]Mild [ ]Moderate [ ]Severe  Constipation:                    [ ]Mild [ ]Moderate [ ]Severe    PCSSQ[Palliative Care Spiritual Screening Question]   Severity (0-10):  Score of 4 or > indicate consideration of Chaplaincy referral.  Chaplaincy Referral: [ ] yes [ ] refused [ ] following [x ] Deferred   4/3"I visited Cayden on [facility] palliative care rounds. She was nonverbal and I had a conversation with her family who were present at bedside. They requested prayer, which I provided. I will remain available for future spiritual and emotional care needs."  Caregiver [place]? : [ ] yes [x ] no [ ] Deferred [ ] Declined             Social work referral [ ] Patient & Family Centered Care Referral [ ]     Anticipatory Grief present?:  [ ] yes [ x] no  [ ] Deferred                  Social work referral [ ] Chaplaincy Referral[ ]      Other Symptoms:  [ ]All other review of systems negative   [ x]Unable to obtain due to poor mentation    Palliative Performance Status Version 2:     30    %      http://npcrc.org/files/news/palliative_performance_scale_ppsv2.pdf  PHYSICAL EXAM:  Vital Signs Last 24 Hrs  T(C): 36.4 (04 Apr 2025 16:00), Max: 36.8 (04 Apr 2025 08:50)  T(F): 97.6 (04 Apr 2025 16:00), Max: 98.3 (04 Apr 2025 08:50)  HR: 69 (04 Apr 2025 16:00) (56 - 69)  BP: 128/73 (04 Apr 2025 16:00) (122/67 - 154/79)  BP(mean): --  RR: 18 (04 Apr 2025 16:00) (18 - 18)  SpO2: 98% (04 Apr 2025 16:00) (98% - 100%)    Parameters below as of 04 Apr 2025 16:00  Patient On (Oxygen Delivery Method): room air     I&O's Summary    03 Apr 2025 07:01  -  04 Apr 2025 07:00  --------------------------------------------------------  IN: 0 mL / OUT: 300 mL / NET: -300 mL       GENERAL:  [x]Alert  [ ]Oriented x 3  [ ]Lethargic  [ ]Cachexia  [ ]Unarousable  [x]Verbal  [ ]Non-Verbal  Behavioral:   [ ]Anxiety  [ ]Delirium [ ]Agitation [ ]Other  HEENT:  [x]Normal   [ ]Dry mouth   [ ]ET Tube/Trach  [ ]Oral lesions  PULMONARY:   [x]Clear [ ]Tachypnea  [ ]Audible excessive secretions   [ ]Rhonchi        [ ]Right [ ]Left [ ]Bilateral  [ ]Crackles        [ ]Right [ ]Left [ ]Bilateral  [ ]Wheezing     [ ]Right [ ]Left [ ]Bilateral  [ ]Diminished BS [ ] Right [ ]Left [ ]Bilateral  CARDIOVASCULAR:    [x]Regular [ ]Irregular [ ]Tachy  [ ]Yossi [ ]Murmur [ ]Other  GASTROINTESTINAL:  [x]Soft  [ ]Distended   [x]+BS  [x]Non tender [ ]Tender  [ ]PEG [ ]OGT/ NGT   Last BM:    GENITOURINARY:  [x]Normal [ ]Incontinent   [ ]Oliguria/Anuria   [ ]Verdugo  MUSCULOSKELETAL:   [ ]Normal   [x]Weakness  [ ]Bed/Wheelchair bound [ ]Edema  NEUROLOGIC:   [x]No focal deficits  [ ] Cognitive impairment  [ ] Dysphagia [ ]Dysarthria [ ] Paresis [ ]Other   SKIN:   [x]Normal  [ ]Rash   [ ]Pressure ulcer(s) [ ]y [ ]n present on admission    CRITICAL CARE:  [ ]Shock Present  [ ]Septic [ ]Cardiogenic [ ]Neurologic [ ]Hypovolemic  [ ]Vasopressors [ ]Inotropes  [ ]Respiratory failure present [ ]Mechanical Ventilation [ ]Non-invasive ventilatory support [ ]High-Flow   [ ]Acute  [ ]Chronic [ ]Hypoxic  [ ]Hypercarbic [ ]Other  [ ]Other organ failure     LABS:                        10.3   8.71  )-----------( 478      ( 04 Apr 2025 09:19 )             31.4   04-04    140  |  103  |  30[H]  ----------------------------<  232[H]  4.3   |  23  |  0.89    Ca    9.4      04 Apr 2025 09:19        Urinalysis Basic - ( 04 Apr 2025 09:19 )    Color: x / Appearance: x / SG: x / pH: x  Gluc: 232 mg/dL / Ketone: x  / Bili: x / Urobili: x   Blood: x / Protein: x / Nitrite: x   Leuk Esterase: x / RBC: x / WBC x   Sq Epi: x / Non Sq Epi: x / Bacteria: x      RADIOLOGY & ADDITIONAL STUDIES: Imaging reviewed    Protein Calorie Malnutrition Present: [ ]mild [ ]moderate [ ]severe [ ]underweight [ ]morbid obesity  https://www.andeal.org/vault/2440/web/files/ONC/Table_Clinical%20Characteristics%20to%20Document%20Malnutrition-White%20JV%20et%20al%202012.pdf    Height (cm): 167.6 (03-16-25 @ 06:13)  Weight (kg): 63.7 (03-31-25 @ 16:50), 65.8 (03-15-25 @ 22:35)  BMI (kg/m2): 22.7 (03-31-25 @ 16:50), 23.4 (03-16-25 @ 06:13)    [ ]PPSV2 < or = 30%  [ ]significant weight loss [ ]poor nutritional intake [ ]anasarca[ ]Artificial Nutrition    Other REFERRALS:  [ ]Hospice  [ ]Child Life  [ ]Social Work  [ ]Case management [ ]Holistic Therapy     Goals of Care Document:

## 2025-04-04 NOTE — PROGRESS NOTE ADULT - ASSESSMENT
89yo F w/ hx of DM2, HTN, OA, spinal stenosis, CKD III, chronic anemia, hyperthyroidism, recently found meningioma with vasogenic edema, compression of the left frontal horn, and slight midline shift on CT head during admssion at  3/2025 for difficulty ambulating. No acute NSurg intervention, started on keppra 250BID for seizure ppx. Pt BIBEMS for AMS. Family at bedside notes patient started to become less verbal  baseline AAO X4.  Found blood glucose elevated over 200 was given 3 units of Admelog.   Patient became nonverbal prompting family calling EMS. BGL on presentation 82.  Palliative care called for [facility]

## 2025-04-04 NOTE — PROGRESS NOTE ADULT - ASSESSMENT
89yo F w/ hx of DM2, HTN, OA, spinal stenosis, CKD III, chronic anemia, hyperthyroidism, recently found meningioma with vasogenic edema, compression of the left frontal horn, and slight midline shift on CT head during admssion at  3/2025 for difficulty ambulating. No acute NSurg intervention, started on keppra 250BID for seizure ppx. Pt BIBEMS for AMS.      Assessment  DMT2: 89y Female with DM T2 with hyperglycemia, A1C 8.3% , was on oral meds and insulin at home, now on basal bolus insulin with coverage, blood sugars running high while on high dose steroids.   Hyperthyroid: On MMI 5 mg daily, TFTs euthyroid .   HTN: on antihypertensive medications, monitored, asymptomatic.    Janice Romero MD  Cell: [phone]  Office: [phone]

## 2025-04-04 NOTE — PROGRESS NOTE ADULT - CONVERSATION DETAILS
Met with family for ongoing GOC discussion. Edgardo Francis reports she is HCP along with sister Bolivar (HCP form to be obtained). She shared that they still need to discuss code status and nutritional GOC with rest of family. Confirmed that pt remains full code with no limitations to interventions at this time. They are agreeable to f/u discussion next week.

## 2025-04-04 NOTE — PROGRESS NOTE ADULT - SUBJECTIVE AND OBJECTIVE BOX
DATE OF SERVICE: 04-04-25 @ 11:25    Patient is a 89y old  Female who presents with a chief complaint of AMS (04 Apr 2025 08:18)      SUBJECTIVE / OVERNIGHT EVENTS:  as per daughter pt sat in chair yesterday , ate entire lunch try and was interacting with family    MEDICATIONS  (STANDING):  amLODIPine   Tablet 10 milliGRAM(s) Oral daily  ascorbic acid 500 milliGRAM(s) Oral daily  dexAMETHasone  Injectable 4 milliGRAM(s) IV Push every 6 hours  dextrose 5%. 1000 milliLiter(s) (100 mL/Hr) IV Continuous <Continuous>  dextrose 5%. 1000 milliLiter(s) (50 mL/Hr) IV Continuous <Continuous>  dextrose 50% Injectable 25 Gram(s) IV Push once  dextrose 50% Injectable 12.5 Gram(s) IV Push once  dextrose 50% Injectable 25 Gram(s) IV Push once  glucagon  Injectable 1 milliGRAM(s) IntraMuscular once  influenza  Vaccine (HIGH DOSE) 0.5 milliLiter(s) IntraMuscular once  insulin glargine Injectable (LANTUS) 17 Unit(s) SubCutaneous at bedtime  insulin lispro (ADMELOG) corrective regimen sliding scale   SubCutaneous three times a day before meals  insulin lispro (ADMELOG) corrective regimen sliding scale   SubCutaneous at bedtime  insulin lispro Injectable (ADMELOG) 8 Unit(s) SubCutaneous three times a day before meals  levETIRAcetam   Injectable 500 milliGRAM(s) IV Push every 12 hours  methimazole 5 milliGRAM(s) Oral daily  multivitamin/minerals 1 Tablet(s) Oral daily    MEDICATIONS  (PRN):  dextrose Oral Gel 15 Gram(s) Oral once PRN Blood Glucose LESS THAN 70 milliGRAM(s)/deciliter      Vital Signs Last 24 Hrs  T(C): 36.8 (04 Apr 2025 08:50), Max: 36.8 (04 Apr 2025 08:50)  T(F): 98.3 (04 Apr 2025 08:50), Max: 98.3 (04 Apr 2025 08:50)  HR: 61 (04 Apr 2025 08:50) (56 - 63)  BP: 122/67 (04 Apr 2025 08:50) (122/67 - 167/78)  BP(mean): --  RR: 18 (04 Apr 2025 08:50) (18 - 18)  SpO2: 99% (04 Apr 2025 08:50) (94% - 100%)    Parameters below as of 04 Apr 2025 08:50  Patient On (Oxygen Delivery Method): room air      CAPILLARY BLOOD GLUCOSE      POCT Blood Glucose.: 256 mg/dL (04 Apr 2025 08:02)  POCT Blood Glucose.: 304 mg/dL (03 Apr 2025 21:02)  POCT Blood Glucose.: 292 mg/dL (03 Apr 2025 17:09)  POCT Blood Glucose.: 193 mg/dL (03 Apr 2025 12:13)    I&O's Summary    03 Apr 2025 07:01  -  04 Apr 2025 07:00  --------------------------------------------------------  IN: 0 mL / OUT: 300 mL / NET: -300 mL        PHYSICAL EXAM:  GENERAL: NAD, well-developed  HEAD:  Atraumatic, Normocephalic  EYES: EOMI, PERRLA, conjunctiva and sclera clear  NECK: Supple, No JVD  CHEST/LUNG: Clear to auscultation bilaterally; No wheeze  HEART: Regular rate and rhythm; No murmurs, rubs, or gallops  ABDOMEN: Soft, Nontender, Nondistended; Bowel sounds present  EXTREMITIES:  2+ Peripheral Pulses, No clubbing, cyanosis, or edema  NEUROLOGY: non-focal  SKIN: No rashes or lesions    LABS:                        10.3   8.71  )-----------( 478      ( 04 Apr 2025 09:19 )             31.4     04-04    140  |  103  |  30[H]  ----------------------------<  232[H]  4.3   |  23  |  0.89    Ca    9.4      04 Apr 2025 09:19            Urinalysis Basic - ( 04 Apr 2025 09:19 )    Color: x / Appearance: x / SG: x / pH: x  Gluc: 232 mg/dL / Ketone: x  / Bili: x / Urobili: x   Blood: x / Protein: x / Nitrite: x   Leuk Esterase: x / RBC: x / WBC x   Sq Epi: x / Non Sq Epi: x / Bacteria: x        RADIOLOGY & ADDITIONAL TESTS:    Imaging Personally Reviewed:    Consultant(s) Notes Reviewed:      Care Discussed with Consultants/Other Providers:

## 2025-04-04 NOTE — PROGRESS NOTE ADULT - SUBJECTIVE AND OBJECTIVE BOX
Chief complaint    Patient is a 89y old  Female who presents with a chief complaint of AMS (03 Apr 2025 16:05)   Review of systems  Patient appears comfortable.    Labs and Fingersticks  CAPILLARY BLOOD GLUCOSE      POCT Blood Glucose.: 256 mg/dL (04 Apr 2025 08:02)  POCT Blood Glucose.: 304 mg/dL (03 Apr 2025 21:02)  POCT Blood Glucose.: 292 mg/dL (03 Apr 2025 17:09)  POCT Blood Glucose.: 193 mg/dL (03 Apr 2025 12:13)      Anion Gap: 15 (04-02 @ 10:01)      Calcium: 9.1 (04-02 @ 10:01)          04-02    136  |  99  |  33[H]  ----------------------------<  325[H]  4.3   |  22  |  1.01    Ca    9.1      02 Apr 2025 10:01  Phos  2.6     04-02  Mg     1.9     04-02                          9.5    11.85 )-----------( 495      ( 02 Apr 2025 10:01 )             29.9     Medications  MEDICATIONS  (STANDING):  amLODIPine   Tablet 10 milliGRAM(s) Oral daily  ascorbic acid 500 milliGRAM(s) Oral daily  dexAMETHasone  Injectable 4 milliGRAM(s) IV Push every 6 hours  dextrose 5%. 1000 milliLiter(s) (100 mL/Hr) IV Continuous <Continuous>  dextrose 5%. 1000 milliLiter(s) (50 mL/Hr) IV Continuous <Continuous>  dextrose 50% Injectable 25 Gram(s) IV Push once  dextrose 50% Injectable 12.5 Gram(s) IV Push once  dextrose 50% Injectable 25 Gram(s) IV Push once  glucagon  Injectable 1 milliGRAM(s) IntraMuscular once  influenza  Vaccine (HIGH DOSE) 0.5 milliLiter(s) IntraMuscular once  insulin glargine Injectable (LANTUS) 17 Unit(s) SubCutaneous at bedtime  insulin lispro (ADMELOG) corrective regimen sliding scale   SubCutaneous three times a day before meals  insulin lispro (ADMELOG) corrective regimen sliding scale   SubCutaneous at bedtime  insulin lispro Injectable (ADMELOG) 8 Unit(s) SubCutaneous three times a day before meals  levETIRAcetam   Injectable 500 milliGRAM(s) IV Push every 12 hours  methimazole 5 milliGRAM(s) Oral daily  multivitamin/minerals 1 Tablet(s) Oral daily      Physical Exam  General: Patient appears comfortable.  Vital Signs Last 12 Hrs  T(F): 97.8 (04-04-25 @ 05:43), Max: 97.8 (04-04-25 @ 05:43)  HR: 63 (04-04-25 @ 05:43) (56 - 63)  BP: 138/52 (04-04-25 @ 05:43) (138/52 - 138/52)  BP(mean): --  RR: 18 (04-04-25 @ 05:43) (18 - 18)  SpO2: 100% (04-04-25 @ 05:43) (99% - 100%)  Neck: No palpable thyroid nodules.  CVS: S1S2, No murmurs  Respiratory: No wheezing, no crepitations  GI: Abdomen soft, non tender.    Diagnostics    Free Thyroxine, Serum: AM Sched. Collection: 03-Apr-2025 06:00 (04-02 @ 08:18)  Thyroid Stimulating Hormone, Serum: AM Sched. Collection: 03-Apr-2025 06:00 (04-02 @ 08:18)      Radiology:

## 2025-04-05 LAB
CULTURE RESULTS: SIGNIFICANT CHANGE UP
CULTURE RESULTS: SIGNIFICANT CHANGE UP
GLUCOSE BLDC GLUCOMTR-MCNC: 154 MG/DL — HIGH (ref 70–99)
GLUCOSE BLDC GLUCOMTR-MCNC: 224 MG/DL — HIGH (ref 70–99)
GLUCOSE BLDC GLUCOMTR-MCNC: 244 MG/DL — HIGH (ref 70–99)
GLUCOSE BLDC GLUCOMTR-MCNC: 259 MG/DL — HIGH (ref 70–99)
SPECIMEN SOURCE: SIGNIFICANT CHANGE UP
SPECIMEN SOURCE: SIGNIFICANT CHANGE UP

## 2025-04-05 RX ADMIN — DEXAMETHASONE 4 MILLIGRAM(S): 0.5 TABLET ORAL at 06:25

## 2025-04-05 RX ADMIN — Medication 500 MILLIGRAM(S): at 12:27

## 2025-04-05 RX ADMIN — INSULIN LISPRO 8 UNIT(S): 100 INJECTION, SOLUTION INTRAVENOUS; SUBCUTANEOUS at 08:05

## 2025-04-05 RX ADMIN — LEVETIRACETAM 500 MILLIGRAM(S): 10 INJECTION, SOLUTION INTRAVENOUS at 17:14

## 2025-04-05 RX ADMIN — LEVETIRACETAM 500 MILLIGRAM(S): 10 INJECTION, SOLUTION INTRAVENOUS at 06:25

## 2025-04-05 RX ADMIN — DEXAMETHASONE 4 MILLIGRAM(S): 0.5 TABLET ORAL at 17:14

## 2025-04-05 RX ADMIN — AMLODIPINE BESYLATE 10 MILLIGRAM(S): 10 TABLET ORAL at 08:06

## 2025-04-05 RX ADMIN — INSULIN LISPRO 8 UNIT(S): 100 INJECTION, SOLUTION INTRAVENOUS; SUBCUTANEOUS at 12:26

## 2025-04-05 RX ADMIN — INSULIN LISPRO 8 UNIT(S): 100 INJECTION, SOLUTION INTRAVENOUS; SUBCUTANEOUS at 17:14

## 2025-04-05 RX ADMIN — INSULIN GLARGINE-YFGN 17 UNIT(S): 100 INJECTION, SOLUTION SUBCUTANEOUS at 21:34

## 2025-04-05 RX ADMIN — Medication 1 TABLET(S): at 12:27

## 2025-04-05 RX ADMIN — INSULIN LISPRO 2: 100 INJECTION, SOLUTION INTRAVENOUS; SUBCUTANEOUS at 12:26

## 2025-04-05 RX ADMIN — DEXAMETHASONE 4 MILLIGRAM(S): 0.5 TABLET ORAL at 12:26

## 2025-04-05 RX ADMIN — INSULIN LISPRO 3: 100 INJECTION, SOLUTION INTRAVENOUS; SUBCUTANEOUS at 17:13

## 2025-04-05 RX ADMIN — INSULIN LISPRO 1: 100 INJECTION, SOLUTION INTRAVENOUS; SUBCUTANEOUS at 08:05

## 2025-04-05 NOTE — PROGRESS NOTE ADULT - SUBJECTIVE AND OBJECTIVE BOX
DATE OF SERVICE: 04-05-25 @ 08:02    Patient is a 89y old  Female who presents with a chief complaint of AMS (04 Apr 2025 16:17)      SUBJECTIVE / OVERNIGHT EVENTS:  No chest pain. No shortness of breath. No complaints. No events overnight.     MEDICATIONS  (STANDING):  amLODIPine   Tablet 10 milliGRAM(s) Oral daily  ascorbic acid 500 milliGRAM(s) Oral daily  dexAMETHasone  Injectable 4 milliGRAM(s) IV Push every 6 hours  dextrose 5%. 1000 milliLiter(s) (100 mL/Hr) IV Continuous <Continuous>  dextrose 5%. 1000 milliLiter(s) (50 mL/Hr) IV Continuous <Continuous>  dextrose 50% Injectable 25 Gram(s) IV Push once  dextrose 50% Injectable 12.5 Gram(s) IV Push once  dextrose 50% Injectable 25 Gram(s) IV Push once  glucagon  Injectable 1 milliGRAM(s) IntraMuscular once  influenza  Vaccine (HIGH DOSE) 0.5 milliLiter(s) IntraMuscular once  insulin glargine Injectable (LANTUS) 17 Unit(s) SubCutaneous at bedtime  insulin lispro (ADMELOG) corrective regimen sliding scale   SubCutaneous three times a day before meals  insulin lispro (ADMELOG) corrective regimen sliding scale   SubCutaneous at bedtime  insulin lispro Injectable (ADMELOG) 8 Unit(s) SubCutaneous three times a day before meals  levETIRAcetam   Injectable 500 milliGRAM(s) IV Push every 12 hours  methimazole 5 milliGRAM(s) Oral daily  multivitamin/minerals 1 Tablet(s) Oral daily    MEDICATIONS  (PRN):  dextrose Oral Gel 15 Gram(s) Oral once PRN Blood Glucose LESS THAN 70 milliGRAM(s)/deciliter      Vital Signs Last 24 Hrs  T(C): 36.3 (05 Apr 2025 00:30), Max: 36.8 (04 Apr 2025 08:50)  T(F): 97.4 (05 Apr 2025 00:30), Max: 98.3 (04 Apr 2025 08:50)  HR: 82 (05 Apr 2025 00:30) (61 - 82)  BP: 155/73 (05 Apr 2025 00:30) (122/67 - 155/73)  BP(mean): --  RR: 18 (05 Apr 2025 00:30) (18 - 18)  SpO2: 99% (05 Apr 2025 00:30) (98% - 99%)    Parameters below as of 05 Apr 2025 00:30  Patient On (Oxygen Delivery Method): room air      CAPILLARY BLOOD GLUCOSE      POCT Blood Glucose.: 154 mg/dL (05 Apr 2025 07:26)  POCT Blood Glucose.: 171 mg/dL (04 Apr 2025 21:08)  POCT Blood Glucose.: 249 mg/dL (04 Apr 2025 17:00)  POCT Blood Glucose.: 301 mg/dL (04 Apr 2025 12:05)    I&O's Summary      PHYSICAL EXAM:  GENERAL: NAD, well-developed  HEAD:  Atraumatic, Normocephalic  EYES: EOMI, PERRLA, conjunctiva and sclera clear  NECK: Supple, No JVD  CHEST/LUNG: Clear to auscultation bilaterally; No wheeze  HEART: Regular rate and rhythm; No murmurs, rubs, or gallops  ABDOMEN: Soft, Nontender, Nondistended; Bowel sounds present  EXTREMITIES:  2+ Peripheral Pulses, No clubbing, cyanosis, or edema  PSYCH: AAOx3  NEUROLOGY: non-focal  SKIN: No rashes or lesions    LABS:                        10.3   8.71  )-----------( 478      ( 04 Apr 2025 09:19 )             31.4     04-04    140  |  103  |  30[H]  ----------------------------<  232[H]  4.3   |  23  |  0.89    Ca    9.4      04 Apr 2025 09:19            Urinalysis Basic - ( 04 Apr 2025 09:19 )    Color: x / Appearance: x / SG: x / pH: x  Gluc: 232 mg/dL / Ketone: x  / Bili: x / Urobili: x   Blood: x / Protein: x / Nitrite: x   Leuk Esterase: x / RBC: x / WBC x   Sq Epi: x / Non Sq Epi: x / Bacteria: x        RADIOLOGY & ADDITIONAL TESTS:    Imaging Personally Reviewed:    Consultant(s) Notes Reviewed:      Care Discussed with Consultants/Other Providers:

## 2025-04-05 NOTE — PROGRESS NOTE ADULT - ASSESSMENT
89yo F w/ hx of DM2, HTN, OA, spinal stenosis, CKD III, chronic anemia, hyperthyroidism, recently found meningioma with vasogenic edema, compression of the left frontal horn, and slight midline shift on CT head during admssion at  3/2025 for difficulty ambulating. No acute NSurg intervention, started on keppra 250BID for seizure ppx. Pt BIBEMS for AMS.      Assessment  DMT2: 89y Female with DM T2 with hyperglycemia, A1C 8.3% , was on oral meds and insulin at home, now on basal bolus insulin with coverage, blood sugars are stable.  Hyperthyroid: On MMI 5 mg daily, TFTs euthyroid .   HTN: on antihypertensive medications, monitored, asymptomatic.    Janice Romero MD  Cell: [phone]  Office: [phone]

## 2025-04-05 NOTE — PROGRESS NOTE ADULT - SUBJECTIVE AND OBJECTIVE BOX
Chief complaint    Patient is a 89y old  Female who presents with a chief complaint of AMS (05 Apr 2025 08:02)   Review of systems  Patient appears comfortable.    Labs and Fingersticks  CAPILLARY BLOOD GLUCOSE      POCT Blood Glucose.: 224 mg/dL (05 Apr 2025 11:59)  POCT Blood Glucose.: 154 mg/dL (05 Apr 2025 07:26)  POCT Blood Glucose.: 171 mg/dL (04 Apr 2025 21:08)  POCT Blood Glucose.: 249 mg/dL (04 Apr 2025 17:00)      Anion Gap: 14 (04-04 @ 09:19)      Calcium: 9.4 (04-04 @ 09:19)          04-04    140  |  103  |  30[H]  ----------------------------<  232[H]  4.3   |  23  |  0.89    Ca    9.4      04 Apr 2025 09:19                          10.3   8.71  )-----------( 478      ( 04 Apr 2025 09:19 )             31.4     Medications  MEDICATIONS  (STANDING):  amLODIPine   Tablet 10 milliGRAM(s) Oral daily  ascorbic acid 500 milliGRAM(s) Oral daily  dexAMETHasone  Injectable 4 milliGRAM(s) IV Push every 6 hours  dextrose 5%. 1000 milliLiter(s) (50 mL/Hr) IV Continuous <Continuous>  dextrose 5%. 1000 milliLiter(s) (100 mL/Hr) IV Continuous <Continuous>  dextrose 50% Injectable 25 Gram(s) IV Push once  dextrose 50% Injectable 12.5 Gram(s) IV Push once  dextrose 50% Injectable 25 Gram(s) IV Push once  glucagon  Injectable 1 milliGRAM(s) IntraMuscular once  influenza  Vaccine (HIGH DOSE) 0.5 milliLiter(s) IntraMuscular once  insulin glargine Injectable (LANTUS) 17 Unit(s) SubCutaneous at bedtime  insulin lispro (ADMELOG) corrective regimen sliding scale   SubCutaneous three times a day before meals  insulin lispro (ADMELOG) corrective regimen sliding scale   SubCutaneous at bedtime  insulin lispro Injectable (ADMELOG) 8 Unit(s) SubCutaneous three times a day before meals  levETIRAcetam   Injectable 500 milliGRAM(s) IV Push every 12 hours  methimazole 5 milliGRAM(s) Oral daily  multivitamin/minerals 1 Tablet(s) Oral daily      Physical Exam  General: Patient appears comfortable.  Vital Signs Last 12 Hrs  T(F): 97.4 (04-05-25 @ 07:26), Max: 97.4 (04-05-25 @ 07:26)  HR: 61 (04-05-25 @ 07:26) (61 - 61)  BP: 188/71 (04-05-25 @ 07:26) (188/71 - 188/71)  BP(mean): --  RR: 18 (04-05-25 @ 07:26) (18 - 18)  SpO2: 96% (04-05-25 @ 07:26) (96% - 96%)  Neck: No palpable thyroid nodules.  CVS: S1S2, No murmurs  Respiratory: No wheezing, no crepitations  GI: Abdomen soft, non tender.    Diagnostics    Free Thyroxine, Serum: AM Sched. Collection: 03-Apr-2025 06:00 (04-02 @ 08:18)  Thyroid Stimulating Hormone, Serum: AM Sched. Collection: 03-Apr-2025 06:00 (04-02 @ 08:18)      Radiology:

## 2025-04-05 NOTE — PROGRESS NOTE ADULT - ASSESSMENT
89yo F w/ hx of DM2, HTN, OA, spinal stenosis, CKD III, chronic anemia, hyperthyroidism, recently found meningioma with vasogenic edema, compression of the left frontal horn, and slight midline shift on CT head during admssion at  3/2025 for difficulty ambulating. No acute NSurg intervention, started on keppra 250BID for seizure ppx. Pt BIBEMS for AMS. Family at bedside notes patient started to become less verbal around noontime yesterday, baseline AAO X4.  Found blood glucose elevated over 200 was given 3 units of Admelog.  Later in the day blood glucose became less than 90s.  Patient became nonverbal prompting family calling EMS. BGL on presentation 82.      meningioma with vasogenic edema,   - decadron per NS  - mr of brain with contrast done  - family refused surgery    diabetes  - fs qid  - insulin ss  - hgb a1c 7.6  - endo following    Ethics   - GOC and 30 minutes for advanced care planning discussion separate and in addition to the E&M service provided was discussed with daughter  pt can benefit from peg due to inconsistent po intake and medication intake.  I spoke with family who are undecided.  - pt was seen by  palliative care as well  - family deciding about code status and peg tube placement

## 2025-04-06 DIAGNOSIS — E05.90 THYROTOXICOSIS, UNSPECIFIED WITHOUT THYROTOXIC CRISIS OR STORM: ICD-10-CM

## 2025-04-06 LAB
GLUCOSE BLDC GLUCOMTR-MCNC: 132 MG/DL — HIGH (ref 70–99)
GLUCOSE BLDC GLUCOMTR-MCNC: 151 MG/DL — HIGH (ref 70–99)
GLUCOSE BLDC GLUCOMTR-MCNC: 173 MG/DL — HIGH (ref 70–99)
GLUCOSE BLDC GLUCOMTR-MCNC: 189 MG/DL — HIGH (ref 70–99)

## 2025-04-06 RX ORDER — DEXAMETHASONE 0.5 MG/1
4 TABLET ORAL EVERY 8 HOURS
Refills: 0 | Status: DISCONTINUED | OUTPATIENT
Start: 2025-04-06 | End: 2025-04-07

## 2025-04-06 RX ADMIN — INSULIN LISPRO 1: 100 INJECTION, SOLUTION INTRAVENOUS; SUBCUTANEOUS at 08:24

## 2025-04-06 RX ADMIN — DEXAMETHASONE 4 MILLIGRAM(S): 0.5 TABLET ORAL at 05:09

## 2025-04-06 RX ADMIN — Medication 500 MILLIGRAM(S): at 11:38

## 2025-04-06 RX ADMIN — DEXAMETHASONE 4 MILLIGRAM(S): 0.5 TABLET ORAL at 23:32

## 2025-04-06 RX ADMIN — DEXAMETHASONE 4 MILLIGRAM(S): 0.5 TABLET ORAL at 13:20

## 2025-04-06 RX ADMIN — INSULIN GLARGINE-YFGN 17 UNIT(S): 100 INJECTION, SOLUTION SUBCUTANEOUS at 22:13

## 2025-04-06 RX ADMIN — DEXAMETHASONE 4 MILLIGRAM(S): 0.5 TABLET ORAL at 00:37

## 2025-04-06 RX ADMIN — Medication 1 TABLET(S): at 11:39

## 2025-04-06 RX ADMIN — AMLODIPINE BESYLATE 10 MILLIGRAM(S): 10 TABLET ORAL at 06:25

## 2025-04-06 RX ADMIN — INSULIN LISPRO 1: 100 INJECTION, SOLUTION INTRAVENOUS; SUBCUTANEOUS at 12:21

## 2025-04-06 RX ADMIN — LEVETIRACETAM 500 MILLIGRAM(S): 10 INJECTION, SOLUTION INTRAVENOUS at 05:09

## 2025-04-06 RX ADMIN — INSULIN LISPRO 8 UNIT(S): 100 INJECTION, SOLUTION INTRAVENOUS; SUBCUTANEOUS at 08:24

## 2025-04-06 RX ADMIN — INSULIN LISPRO 8 UNIT(S): 100 INJECTION, SOLUTION INTRAVENOUS; SUBCUTANEOUS at 12:21

## 2025-04-06 RX ADMIN — LEVETIRACETAM 500 MILLIGRAM(S): 10 INJECTION, SOLUTION INTRAVENOUS at 17:09

## 2025-04-06 NOTE — PROGRESS NOTE ADULT - ASSESSMENT
89yo F w/ hx of DM2, HTN, OA, spinal stenosis, CKD III, chronic anemia, hyperthyroidism, recently found meningioma with vasogenic edema, compression of the left frontal horn, and slight midline shift on CT head during admssion at  3/2025 for difficulty ambulating. No acute NSurg intervention, started on keppra 250BID for seizure ppx. Pt BIBEMS for AMS.      Assessment  DMT2: 89y Female with DM T2 with hyperglycemia, A1C 8.3% , was on oral meds and insulin at home, now on basal bolus insulin with coverage, blood sugars are stable.  Hyperthyroid: On MMI 5 mg daily, TFTs euthyroid .   HTN: on antihypertensive medications, monitored, asymptomatic.    Discussed plan and management with Dr Nick Avilez NP - TEAMS  Janice Romero MD  Cell: [phone]  Office: [phone]            89yo F w/ hx of DM2, HTN, OA, spinal stenosis, CKD III, chronic anemia, hyperthyroidism, recently found meningioma with vasogenic edema, compression of the left frontal horn, and slight midline shift on CT head during admssion at  3/2025 for difficulty ambulating. No acute NSurg intervention, started on keppra 250BID for seizure ppx. Pt BIBEMS for AMS.    Assessment  DMT2: 89y Female with DM T2 with hyperglycemia, A1C 8.3% , was on oral meds and insulin at home, now on basal bolus insulin with coverage, blood sugars are stable.  Hyperthyroid: On MMI 5 mg daily, TFTs euthyroid .   HTN: on antihypertensive medications, monitored, asymptomatic.    Discussed plan and management with Dr Nick Avilez NP - TEAMS  Janice Romero MD  Cell: [phone]  Office: [phone]

## 2025-04-06 NOTE — PROGRESS NOTE ADULT - ASSESSMENT
89yo F w/ hx of DM2, HTN, OA, spinal stenosis, CKD III, chronic anemia, hyperthyroidism, recently found meningioma with vasogenic edema, compression of the left frontal horn, and slight midline shift on CT head during admssion at  3/2025 for difficulty ambulating. No acute NSurg intervention, started on keppra 250BID for seizure ppx. Pt BIBEMS for AMS. Family at bedside notes patient started to become less verbal around noontime yesterday, baseline AAO X4.  Found blood glucose elevated over 200 was given 3 units of Admelog.  Later in the day blood glucose became less than 90s.  Patient became nonverbal prompting family calling EMS. BGL on presentation 82.      meningioma with vasogenic edema,   - decadron taper to 2 bid per NS  - mr of brain with contrast done  - family refused surgery    diabetes  - fs qid  - insulin ss  - hgb a1c 7.6  - endo following    Ethics   - GOC and 30 minutes for advanced care planning discussion separate and in addition to the E&M service provided was discussed with daughter  pt can benefit from peg due to inconsistent po intake and medication intake.  I spoke with family who are undecided.  - pt was seen by  palliative care as well  - family deciding about code status and peg tube placement

## 2025-04-06 NOTE — PROGRESS NOTE ADULT - SUBJECTIVE AND OBJECTIVE BOX
DATE OF SERVICE: 04-06-25 @ 09:24    Patient is a 89y old  Female who presents with a chief complaint of AMS (05 Apr 2025 14:59)      SUBJECTIVE / OVERNIGHT EVENTS:  has been tolerating diet    MEDICATIONS  (STANDING):  amLODIPine   Tablet 10 milliGRAM(s) Oral daily  ascorbic acid 500 milliGRAM(s) Oral daily  dexAMETHasone  Injectable 4 milliGRAM(s) IV Push every 6 hours  dextrose 5%. 1000 milliLiter(s) (100 mL/Hr) IV Continuous <Continuous>  dextrose 5%. 1000 milliLiter(s) (50 mL/Hr) IV Continuous <Continuous>  dextrose 50% Injectable 25 Gram(s) IV Push once  dextrose 50% Injectable 12.5 Gram(s) IV Push once  dextrose 50% Injectable 25 Gram(s) IV Push once  glucagon  Injectable 1 milliGRAM(s) IntraMuscular once  influenza  Vaccine (HIGH DOSE) 0.5 milliLiter(s) IntraMuscular once  insulin glargine Injectable (LANTUS) 17 Unit(s) SubCutaneous at bedtime  insulin lispro (ADMELOG) corrective regimen sliding scale   SubCutaneous three times a day before meals  insulin lispro (ADMELOG) corrective regimen sliding scale   SubCutaneous at bedtime  insulin lispro Injectable (ADMELOG) 8 Unit(s) SubCutaneous three times a day before meals  levETIRAcetam   Injectable 500 milliGRAM(s) IV Push every 12 hours  methimazole 5 milliGRAM(s) Oral daily  multivitamin/minerals 1 Tablet(s) Oral daily    MEDICATIONS  (PRN):  dextrose Oral Gel 15 Gram(s) Oral once PRN Blood Glucose LESS THAN 70 milliGRAM(s)/deciliter      Vital Signs Last 24 Hrs  T(C): 36.4 (06 Apr 2025 07:40), Max: 37.1 (06 Apr 2025 06:20)  T(F): 97.5 (06 Apr 2025 07:40), Max: 98.7 (06 Apr 2025 06:20)  HR: 53 (06 Apr 2025 07:40) (53 - 81)  BP: 175/75 (06 Apr 2025 07:40) (142/75 - 180/76)  BP(mean): --  RR: 18 (06 Apr 2025 07:40) (18 - 18)  SpO2: 97% (06 Apr 2025 07:40) (97% - 99%)    Parameters below as of 06 Apr 2025 07:40  Patient On (Oxygen Delivery Method): room air      CAPILLARY BLOOD GLUCOSE      POCT Blood Glucose.: 173 mg/dL (06 Apr 2025 07:47)  POCT Blood Glucose.: 244 mg/dL (05 Apr 2025 21:09)  POCT Blood Glucose.: 259 mg/dL (05 Apr 2025 16:57)  POCT Blood Glucose.: 224 mg/dL (05 Apr 2025 11:59)    I&O's Summary    05 Apr 2025 07:01  -  06 Apr 2025 07:00  --------------------------------------------------------  IN: 0 mL / OUT: 500 mL / NET: -500 mL        PHYSICAL EXAM:  GENERAL: NAD, well-developed  HEAD:  Atraumatic, Normocephalic  EYES: EOMI, PERRLA, conjunctiva and sclera clear  NECK: Supple, No JVD  CHEST/LUNG: Clear to auscultation bilaterally; No wheeze  HEART: Regular rate and rhythm; No murmurs, rubs, or gallops  ABDOMEN: Soft, Nontender, Nondistended; Bowel sounds present  EXTREMITIES:  2+ Peripheral Pulses, No clubbing, cyanosis, or edema  NEUROLOGY: non-focal  SKIN: No rashes or lesions    LABS:                    RADIOLOGY & ADDITIONAL TESTS:    Imaging Personally Reviewed:    Consultant(s) Notes Reviewed:      Care Discussed with Consultants/Other Providers:

## 2025-04-06 NOTE — PROGRESS NOTE ADULT - SUBJECTIVE AND OBJECTIVE BOX
Chief complaint  Patient is a 89y old  Female who presents with a chief complaint of AMS (06 Apr 2025 09:23)         Labs and Fingersticks  CAPILLARY BLOOD GLUCOSE      POCT Blood Glucose.: 189 mg/dL (06 Apr 2025 11:52)  POCT Blood Glucose.: 173 mg/dL (06 Apr 2025 07:47)  POCT Blood Glucose.: 244 mg/dL (05 Apr 2025 21:09)  POCT Blood Glucose.: 259 mg/dL (05 Apr 2025 16:57)                        Medications  MEDICATIONS  (STANDING):  amLODIPine   Tablet 10 milliGRAM(s) Oral daily  ascorbic acid 500 milliGRAM(s) Oral daily  dexAMETHasone  Injectable 4 milliGRAM(s) IV Push every 8 hours  dextrose 5%. 1000 milliLiter(s) (100 mL/Hr) IV Continuous <Continuous>  dextrose 5%. 1000 milliLiter(s) (50 mL/Hr) IV Continuous <Continuous>  dextrose 50% Injectable 25 Gram(s) IV Push once  dextrose 50% Injectable 12.5 Gram(s) IV Push once  dextrose 50% Injectable 25 Gram(s) IV Push once  glucagon  Injectable 1 milliGRAM(s) IntraMuscular once  influenza  Vaccine (HIGH DOSE) 0.5 milliLiter(s) IntraMuscular once  insulin glargine Injectable (LANTUS) 17 Unit(s) SubCutaneous at bedtime  insulin lispro (ADMELOG) corrective regimen sliding scale   SubCutaneous three times a day before meals  insulin lispro (ADMELOG) corrective regimen sliding scale   SubCutaneous at bedtime  insulin lispro Injectable (ADMELOG) 8 Unit(s) SubCutaneous three times a day before meals  levETIRAcetam   Injectable 500 milliGRAM(s) IV Push every 12 hours  methimazole 5 milliGRAM(s) Oral daily  multivitamin/minerals 1 Tablet(s) Oral daily      Physical Exam  General: Patient comfortable in bed   Vital Signs Last 12 Hrs  T(F): 97.5 (04-06-25 @ 07:40), Max: 98.7 (04-06-25 @ 06:20)  HR: 53 (04-06-25 @ 07:40) (53 - 63)  BP: 175/75 (04-06-25 @ 07:40) (148/85 - 175/75)  BP(mean): --  RR: 18 (04-06-25 @ 07:40) (18 - 18)  SpO2: 97% (04-06-25 @ 07:40) (97% - 99%)    CVS: S1S2   Respiratory: No wheezing, no crepitations  GI: Abdomen soft, bowel sounds positive  Musculoskeletal:  moves all extremities       Chief complaint  Patient is a 89y old  Female who presents with a chief complaint of AMS (06 Apr 2025 09:23)         Labs and Fingersticks  CAPILLARY BLOOD GLUCOSE      POCT Blood Glucose.: 189 mg/dL (06 Apr 2025 11:52)  POCT Blood Glucose.: 173 mg/dL (06 Apr 2025 07:47)  POCT Blood Glucose.: 244 mg/dL (05 Apr 2025 21:09)  POCT Blood Glucose.: 259 mg/dL (05 Apr 2025 16:57)                        Medications  MEDICATIONS  (STANDING):  amLODIPine   Tablet 10 milliGRAM(s) Oral daily  ascorbic acid 500 milliGRAM(s) Oral daily  dexAMETHasone  Injectable 4 milliGRAM(s) IV Push every 8 hours  dextrose 5%. 1000 milliLiter(s) (100 mL/Hr) IV Continuous <Continuous>  dextrose 5%. 1000 milliLiter(s) (50 mL/Hr) IV Continuous <Continuous>  dextrose 50% Injectable 25 Gram(s) IV Push once  dextrose 50% Injectable 12.5 Gram(s) IV Push once  dextrose 50% Injectable 25 Gram(s) IV Push once  glucagon  Injectable 1 milliGRAM(s) IntraMuscular once  influenza  Vaccine (HIGH DOSE) 0.5 milliLiter(s) IntraMuscular once  insulin glargine Injectable (LANTUS) 17 Unit(s) SubCutaneous at bedtime  insulin lispro (ADMELOG) corrective regimen sliding scale   SubCutaneous three times a day before meals  insulin lispro (ADMELOG) corrective regimen sliding scale   SubCutaneous at bedtime  insulin lispro Injectable (ADMELOG) 8 Unit(s) SubCutaneous three times a day before meals  levETIRAcetam   Injectable 500 milliGRAM(s) IV Push every 12 hours  methimazole 5 milliGRAM(s) Oral daily  multivitamin/minerals 1 Tablet(s) Oral daily      Physical Exam  General: Patient comfortable in bed   Vital Signs Last 12 Hrs  T(F): 97.5 (04-06-25 @ 07:40), Max: 98.7 (04-06-25 @ 06:20)  HR: 53 (04-06-25 @ 07:40) (53 - 63)  BP: 175/75 (04-06-25 @ 07:40) (148/85 - 175/75)  BP(mean): --  RR: 18 (04-06-25 @ 07:40) (18 - 18)  SpO2: 97% (04-06-25 @ 07:40) (97% - 99%)    CVS: S1S2   Respiratory: No wheezing, no crepitations  GI: Abdomen soft, bowel sounds positive  Musculoskeletal:  moves all extremities

## 2025-04-07 LAB
GLUCOSE BLDC GLUCOMTR-MCNC: 136 MG/DL — HIGH (ref 70–99)
GLUCOSE BLDC GLUCOMTR-MCNC: 179 MG/DL — HIGH (ref 70–99)
GLUCOSE BLDC GLUCOMTR-MCNC: 210 MG/DL — HIGH (ref 70–99)
GLUCOSE BLDC GLUCOMTR-MCNC: 229 MG/DL — HIGH (ref 70–99)

## 2025-04-07 RX ORDER — HEPARIN SODIUM 1000 [USP'U]/ML
5000 INJECTION INTRAVENOUS; SUBCUTANEOUS EVERY 12 HOURS
Refills: 0 | Status: DISCONTINUED | OUTPATIENT
Start: 2025-04-07 | End: 2025-04-11

## 2025-04-07 RX ORDER — DEXAMETHASONE 0.5 MG/1
4 TABLET ORAL EVERY 12 HOURS
Refills: 0 | Status: DISCONTINUED | OUTPATIENT
Start: 2025-04-07 | End: 2025-04-10

## 2025-04-07 RX ORDER — POLYETHYLENE GLYCOL 3350 17 G/17G
17 POWDER, FOR SOLUTION ORAL DAILY
Refills: 0 | Status: DISCONTINUED | OUTPATIENT
Start: 2025-04-07 | End: 2025-04-08

## 2025-04-07 RX ADMIN — DEXAMETHASONE 4 MILLIGRAM(S): 0.5 TABLET ORAL at 17:13

## 2025-04-07 RX ADMIN — AMLODIPINE BESYLATE 10 MILLIGRAM(S): 10 TABLET ORAL at 05:23

## 2025-04-07 RX ADMIN — DEXAMETHASONE 4 MILLIGRAM(S): 0.5 TABLET ORAL at 05:23

## 2025-04-07 RX ADMIN — Medication 1 APPLICATION(S): at 11:22

## 2025-04-07 RX ADMIN — INSULIN LISPRO 1: 100 INJECTION, SOLUTION INTRAVENOUS; SUBCUTANEOUS at 11:22

## 2025-04-07 RX ADMIN — INSULIN LISPRO 8 UNIT(S): 100 INJECTION, SOLUTION INTRAVENOUS; SUBCUTANEOUS at 17:28

## 2025-04-07 RX ADMIN — INSULIN LISPRO 2: 100 INJECTION, SOLUTION INTRAVENOUS; SUBCUTANEOUS at 17:27

## 2025-04-07 RX ADMIN — LEVETIRACETAM 500 MILLIGRAM(S): 10 INJECTION, SOLUTION INTRAVENOUS at 05:23

## 2025-04-07 RX ADMIN — INSULIN LISPRO 8 UNIT(S): 100 INJECTION, SOLUTION INTRAVENOUS; SUBCUTANEOUS at 11:23

## 2025-04-07 RX ADMIN — Medication 500 MILLIGRAM(S): at 11:23

## 2025-04-07 RX ADMIN — HEPARIN SODIUM 5000 UNIT(S): 1000 INJECTION INTRAVENOUS; SUBCUTANEOUS at 17:13

## 2025-04-07 RX ADMIN — INSULIN GLARGINE-YFGN 17 UNIT(S): 100 INJECTION, SOLUTION SUBCUTANEOUS at 21:42

## 2025-04-07 RX ADMIN — POLYETHYLENE GLYCOL 3350 17 GRAM(S): 17 POWDER, FOR SOLUTION ORAL at 05:22

## 2025-04-07 RX ADMIN — INSULIN LISPRO 8 UNIT(S): 100 INJECTION, SOLUTION INTRAVENOUS; SUBCUTANEOUS at 08:04

## 2025-04-07 RX ADMIN — Medication 1 TABLET(S): at 11:23

## 2025-04-07 NOTE — PROGRESS NOTE ADULT - ASSESSMENT
89yo F w/ hx of DM2, HTN, OA, spinal stenosis, CKD III, chronic anemia, hyperthyroidism, recently found meningioma with vasogenic edema, compression of the left frontal horn, and slight midline shift on CT head during admssion at  3/2025 for difficulty ambulating. No acute NSurg intervention, started on keppra 250BID for seizure ppx. Pt BIBEMS for AMS. Family at bedside notes patient started to become less verbal around noontime yesterday, baseline AAO X4.  Found blood glucose elevated over 200 was given 3 units of Admelog.  Later in the day blood glucose became less than 90s.  Patient became nonverbal prompting family calling EMS. BGL on presentation 82.      meningioma with vasogenic edema,   - decadron taper to 2 bid per NS  - mr of brain with contrast done  - family refused surgery    diabetes  - fs qid  - insulin ss  - hgb a1c 7.6  - endo following    Ethics   - GOC and 30 minutes for advanced care planning discussion separate and in addition to the E&M service provided was discussed with daughter  pt can benefit from peg due to inconsistent po intake and medication intake.  I spoke with family who are undecided.  - pt was seen by  palliative care as well  - d/c planning

## 2025-04-07 NOTE — PROGRESS NOTE ADULT - SUBJECTIVE AND OBJECTIVE BOX
Chief complaint  Patient is a 89y old  Female who presents with a chief complaint of AMS (07 Apr 2025 10:35)         Labs and Fingersticks  CAPILLARY BLOOD GLUCOSE      POCT Blood Glucose.: 179 mg/dL (07 Apr 2025 11:13)  POCT Blood Glucose.: 136 mg/dL (07 Apr 2025 07:50)  POCT Blood Glucose.: 151 mg/dL (06 Apr 2025 21:15)  POCT Blood Glucose.: 132 mg/dL (06 Apr 2025 16:24)                        Medications  MEDICATIONS  (STANDING):  amLODIPine   Tablet 10 milliGRAM(s) Oral daily  ascorbic acid 500 milliGRAM(s) Oral daily  chlorhexidine 2% Cloths 1 Application(s) Topical daily  dexAMETHasone  Injectable 4 milliGRAM(s) IV Push every 12 hours  dextrose 5%. 1000 milliLiter(s) (50 mL/Hr) IV Continuous <Continuous>  dextrose 5%. 1000 milliLiter(s) (100 mL/Hr) IV Continuous <Continuous>  dextrose 50% Injectable 25 Gram(s) IV Push once  dextrose 50% Injectable 12.5 Gram(s) IV Push once  dextrose 50% Injectable 25 Gram(s) IV Push once  glucagon  Injectable 1 milliGRAM(s) IntraMuscular once  heparin   Injectable 5000 Unit(s) SubCutaneous every 12 hours  influenza  Vaccine (HIGH DOSE) 0.5 milliLiter(s) IntraMuscular once  insulin glargine Injectable (LANTUS) 17 Unit(s) SubCutaneous at bedtime  insulin lispro (ADMELOG) corrective regimen sliding scale   SubCutaneous three times a day before meals  insulin lispro (ADMELOG) corrective regimen sliding scale   SubCutaneous at bedtime  insulin lispro Injectable (ADMELOG) 8 Unit(s) SubCutaneous three times a day before meals  methimazole 5 milliGRAM(s) Oral daily  multivitamin/minerals 1 Tablet(s) Oral daily      Physical Exam  General: Patient comfortable in bed   Vital Signs Last 12 Hrs  T(F): 98.3 (04-07-25 @ 15:20), Max: 98.6 (04-07-25 @ 05:38)  HR: 54 (04-07-25 @ 15:20) (52 - 62)  BP: 156/76 (04-07-25 @ 15:20) (156/76 - 181/78)  BP(mean): --  RR: 18 (04-07-25 @ 15:20) (18 - 18)  SpO2: 98% (04-07-25 @ 15:20) (98% - 99%)    CVS: S1S2   Respiratory: No wheezing, no crepitations  GI: Abdomen soft, bowel sounds positive  Musculoskeletal:  moves all extremities  : Voiding       Chief complaint  Patient is a 89y old  Female who presents with a chief complaint of AMS (07 Apr 2025 10:35)         Labs and Fingersticks  CAPILLARY BLOOD GLUCOSE      POCT Blood Glucose.: 179 mg/dL (07 Apr 2025 11:13)  POCT Blood Glucose.: 136 mg/dL (07 Apr 2025 07:50)  POCT Blood Glucose.: 151 mg/dL (06 Apr 2025 21:15)  POCT Blood Glucose.: 132 mg/dL (06 Apr 2025 16:24)                        Medications  MEDICATIONS  (STANDING):  amLODIPine   Tablet 10 milliGRAM(s) Oral daily  ascorbic acid 500 milliGRAM(s) Oral daily  chlorhexidine 2% Cloths 1 Application(s) Topical daily  dexAMETHasone  Injectable 4 milliGRAM(s) IV Push every 12 hours  dextrose 5%. 1000 milliLiter(s) (50 mL/Hr) IV Continuous <Continuous>  dextrose 5%. 1000 milliLiter(s) (100 mL/Hr) IV Continuous <Continuous>  dextrose 50% Injectable 25 Gram(s) IV Push once  dextrose 50% Injectable 12.5 Gram(s) IV Push once  dextrose 50% Injectable 25 Gram(s) IV Push once  glucagon  Injectable 1 milliGRAM(s) IntraMuscular once  heparin   Injectable 5000 Unit(s) SubCutaneous every 12 hours  influenza  Vaccine (HIGH DOSE) 0.5 milliLiter(s) IntraMuscular once  insulin glargine Injectable (LANTUS) 17 Unit(s) SubCutaneous at bedtime  insulin lispro (ADMELOG) corrective regimen sliding scale   SubCutaneous three times a day before meals  insulin lispro (ADMELOG) corrective regimen sliding scale   SubCutaneous at bedtime  insulin lispro Injectable (ADMELOG) 8 Unit(s) SubCutaneous three times a day before meals  methimazole 5 milliGRAM(s) Oral daily  multivitamin/minerals 1 Tablet(s) Oral daily      Physical Exam  General: Patient comfortable in bed   Vital Signs Last 12 Hrs  T(F): 98.3 (04-07-25 @ 15:20), Max: 98.6 (04-07-25 @ 05:38)  HR: 54 (04-07-25 @ 15:20) (52 - 62)  BP: 156/76 (04-07-25 @ 15:20) (156/76 - 181/78)  BP(mean): --  RR: 18 (04-07-25 @ 15:20) (18 - 18)  SpO2: 98% (04-07-25 @ 15:20) (98% - 99%)    CVS: S1S2   Respiratory: No wheezing, no crepitations  GI: Abdomen soft, bowel sounds positive  Musculoskeletal:  moves all extremities  : Voiding

## 2025-04-07 NOTE — CHART NOTE - NSCHARTNOTEFT_GEN_A_CORE
POLYFLY WHEELCHAIR  Patient will require a standard polyfly wheelchair due to Meningioma. The beneficiary has a mobility limitation that significantly impairs their ability to participate in one or more MRADLS such as toileting, feeding, dressing, grooming, and bathing in customary locations in the home. The patient's mobility limitation cannot be sufficiently resolved by the use of an appropriately fitted can or walker. The patient is unable to ambulate with a walker. Use of a manual wheelchair will significantly improve the beneficiary's ability to participate in MRADLs and the beneficiary will use it on a regular basisi in the home. The beneficiary is able and willing to use the wheelchair in the home. The beneficiary cannot self-propel in a standard wheelchair. The patient can self-propel in a polyfly wheelchair. The beneficiary has sufficient upper extremity function and other physical and mental capabilities needed to safely self-propel the manual wheelchair that is provided in the home during a typical day.  Shazia Holt (PA) [employer] # [id number] POLYFLY WHEELCHAIR  Patient will require a standard polyfly wheelchair due to Meningioma. The beneficiary has a mobility limitation that significantly impairs their ability to participate in one or more MRADLS such as toileting, feeding, dressing, grooming, and bathing in customary locations in the home. The patient's mobility limitation cannot be sufficiently resolved by the use of an appropriately fitted can or walker. The patient is unable to ambulate with a walker. Use of a manual wheelchair will significantly improve the beneficiary's ability to participate in MRADLs and the beneficiary will use it on a regular basisi in the home. The beneficiary is able and willing to use the wheelchair in the home. The beneficiary cannot self-propel in a standard wheelchair. The patient can self-propel in a polyfly wheelchair. The beneficiary has sufficient upper extremity function and other physical and mental capabilities needed to safely self-propel the manual wheelchair that is provided in the home during a typical day. The pt has significant edema of the lower extremities that require an elevating leg rest.  Shazia Holt (PA) [employer] # [id number]

## 2025-04-07 NOTE — PROGRESS NOTE ADULT - SUBJECTIVE AND OBJECTIVE BOX
DATE OF SERVICE: 04-07-25 @ 10:35    Patient is a 89y old  Female who presents with a chief complaint of AMS (06 Apr 2025 14:49)      SUBJECTIVE / OVERNIGHT EVENTS:  No chest pain. No shortness of breath. No complaints. No events overnight.     MEDICATIONS  (STANDING):  amLODIPine   Tablet 10 milliGRAM(s) Oral daily  ascorbic acid 500 milliGRAM(s) Oral daily  chlorhexidine 2% Cloths 1 Application(s) Topical daily  dexAMETHasone  Injectable 4 milliGRAM(s) IV Push every 8 hours  dextrose 5%. 1000 milliLiter(s) (50 mL/Hr) IV Continuous <Continuous>  dextrose 5%. 1000 milliLiter(s) (100 mL/Hr) IV Continuous <Continuous>  dextrose 50% Injectable 25 Gram(s) IV Push once  dextrose 50% Injectable 12.5 Gram(s) IV Push once  dextrose 50% Injectable 25 Gram(s) IV Push once  glucagon  Injectable 1 milliGRAM(s) IntraMuscular once  influenza  Vaccine (HIGH DOSE) 0.5 milliLiter(s) IntraMuscular once  insulin glargine Injectable (LANTUS) 17 Unit(s) SubCutaneous at bedtime  insulin lispro (ADMELOG) corrective regimen sliding scale   SubCutaneous three times a day before meals  insulin lispro (ADMELOG) corrective regimen sliding scale   SubCutaneous at bedtime  insulin lispro Injectable (ADMELOG) 8 Unit(s) SubCutaneous three times a day before meals  methimazole 5 milliGRAM(s) Oral daily  multivitamin/minerals 1 Tablet(s) Oral daily    MEDICATIONS  (PRN):  dextrose Oral Gel 15 Gram(s) Oral once PRN Blood Glucose LESS THAN 70 milliGRAM(s)/deciliter  polyethylene glycol 3350 17 Gram(s) Oral daily PRN Constipation      Vital Signs Last 24 Hrs  T(C): 36.5 (07 Apr 2025 07:55), Max: 37 (07 Apr 2025 05:38)  T(F): 97.7 (07 Apr 2025 07:55), Max: 98.6 (07 Apr 2025 05:38)  HR: 52 (07 Apr 2025 07:55) (52 - 62)  BP: 181/78 (07 Apr 2025 07:55) (160/65 - 190/78)  BP(mean): --  RR: 18 (07 Apr 2025 07:55) (18 - 18)  SpO2: 99% (07 Apr 2025 07:55) (97% - 99%)    Parameters below as of 07 Apr 2025 07:55  Patient On (Oxygen Delivery Method): room air      CAPILLARY BLOOD GLUCOSE      POCT Blood Glucose.: 136 mg/dL (07 Apr 2025 07:50)  POCT Blood Glucose.: 151 mg/dL (06 Apr 2025 21:15)  POCT Blood Glucose.: 132 mg/dL (06 Apr 2025 16:24)  POCT Blood Glucose.: 189 mg/dL (06 Apr 2025 11:52)    I&O's Summary    06 Apr 2025 07:01  -  07 Apr 2025 07:00  --------------------------------------------------------  IN: 0 mL / OUT: 100 mL / NET: -100 mL        PHYSICAL EXAM:  GENERAL: NAD, well-developed  HEAD:  Atraumatic, Normocephalic  EYES: EOMI, PERRLA, conjunctiva and sclera clear  NECK: Supple, No JVD  CHEST/LUNG: Clear to auscultation bilaterally; No wheeze  HEART: Regular rate and rhythm; No murmurs, rubs, or gallops  ABDOMEN: Soft, Nontender, Nondistended; Bowel sounds present  EXTREMITIES:  2+ Peripheral Pulses, No clubbing, cyanosis, or edema  NEUROLOGY: non-focal  SKIN: No rashes or lesions    LABS:                    RADIOLOGY & ADDITIONAL TESTS:    Imaging Personally Reviewed:    Consultant(s) Notes Reviewed:      Care Discussed with Consultants/Other Providers:

## 2025-04-07 NOTE — PROGRESS NOTE ADULT - ASSESSMENT
89yo F w/ hx of DM2, HTN, OA, spinal stenosis, CKD III, chronic anemia, hyperthyroidism, recently found meningioma with vasogenic edema, compression of the left frontal horn, and slight midline shift on CT head during admssion at  3/2025 for difficulty ambulating. No acute NSurg intervention, started on keppra 250BID for seizure ppx. Pt BIBEMS for AMS.    Assessment  DMT2: 89y Female with DM T2 with hyperglycemia, A1C 8.3% , was on oral meds and insulin at home, now on basal bolus insulin with coverage, blood sugars are stable.  Hyperthyroid: On MMI 5 mg daily, TFTs euthyroid .   HTN: on antihypertensive medications, monitored, asymptomatic.    Discussed plan and management with Dr Nick Avilez NP - TEAMS  Janice Romero MD  Cell: [phone]  Office: [phone]            89yo F w/ hx of DM2, HTN, OA, spinal stenosis, CKD III, chronic anemia, hyperthyroidism, recently found meningioma with vasogenic edema, compression of the left frontal horn, and slight midline shift on CT head during admssion at  3/2025 for difficulty ambulating. No acute NSurg intervention, started on keppra 250BID for seizure ppx. Pt BIBEMS for AMS.      Assessment  DMT2: 89y Female with DM T2 with hyperglycemia, A1C 8.3% , was on oral meds and insulin at home, now on basal bolus insulin with coverage, blood sugars are stable.  Hyperthyroid: On MMI 5 mg daily, TFTs euthyroid .   HTN: on antihypertensive medications, monitored, asymptomatic.    Discussed plan and management with Dr Nick Avilez NP - TEAMS  Janice Romero MD  Cell: [phone]  Office: [phone]

## 2025-04-08 ENCOUNTER — TRANSCRIPTION ENCOUNTER (OUTPATIENT)
Age: 89
End: 2025-04-08

## 2025-04-08 LAB
GLUCOSE BLDC GLUCOMTR-MCNC: 143 MG/DL — HIGH (ref 70–99)
GLUCOSE BLDC GLUCOMTR-MCNC: 194 MG/DL — HIGH (ref 70–99)
GLUCOSE BLDC GLUCOMTR-MCNC: 221 MG/DL — HIGH (ref 70–99)
GLUCOSE BLDC GLUCOMTR-MCNC: 278 MG/DL — HIGH (ref 70–99)

## 2025-04-08 RX ORDER — POLYETHYLENE GLYCOL 3350 17 G/17G
17 POWDER, FOR SOLUTION ORAL DAILY
Refills: 0 | Status: DISCONTINUED | OUTPATIENT
Start: 2025-04-08 | End: 2025-04-11

## 2025-04-08 RX ORDER — LACTULOSE 10 G/15ML
20 SOLUTION ORAL EVERY 4 HOURS
Refills: 0 | Status: COMPLETED | OUTPATIENT
Start: 2025-04-08 | End: 2025-04-08

## 2025-04-08 RX ADMIN — POLYETHYLENE GLYCOL 3350 17 GRAM(S): 17 POWDER, FOR SOLUTION ORAL at 13:45

## 2025-04-08 RX ADMIN — INSULIN LISPRO 3: 100 INJECTION, SOLUTION INTRAVENOUS; SUBCUTANEOUS at 12:05

## 2025-04-08 RX ADMIN — DEXAMETHASONE 4 MILLIGRAM(S): 0.5 TABLET ORAL at 17:34

## 2025-04-08 RX ADMIN — INSULIN LISPRO 8 UNIT(S): 100 INJECTION, SOLUTION INTRAVENOUS; SUBCUTANEOUS at 16:52

## 2025-04-08 RX ADMIN — AMLODIPINE BESYLATE 10 MILLIGRAM(S): 10 TABLET ORAL at 06:05

## 2025-04-08 RX ADMIN — Medication 50 MILLIGRAM(S): at 00:36

## 2025-04-08 RX ADMIN — INSULIN LISPRO 8 UNIT(S): 100 INJECTION, SOLUTION INTRAVENOUS; SUBCUTANEOUS at 08:31

## 2025-04-08 RX ADMIN — HEPARIN SODIUM 5000 UNIT(S): 1000 INJECTION INTRAVENOUS; SUBCUTANEOUS at 06:05

## 2025-04-08 RX ADMIN — LACTULOSE 20 GRAM(S): 10 SOLUTION ORAL at 13:45

## 2025-04-08 RX ADMIN — Medication 50 MILLIGRAM(S): at 17:34

## 2025-04-08 RX ADMIN — DEXAMETHASONE 4 MILLIGRAM(S): 0.5 TABLET ORAL at 06:05

## 2025-04-08 RX ADMIN — HEPARIN SODIUM 5000 UNIT(S): 1000 INJECTION INTRAVENOUS; SUBCUTANEOUS at 17:33

## 2025-04-08 RX ADMIN — INSULIN LISPRO 8 UNIT(S): 100 INJECTION, SOLUTION INTRAVENOUS; SUBCUTANEOUS at 12:05

## 2025-04-08 RX ADMIN — Medication 50 MILLIGRAM(S): at 06:08

## 2025-04-08 RX ADMIN — INSULIN LISPRO 2: 100 INJECTION, SOLUTION INTRAVENOUS; SUBCUTANEOUS at 16:51

## 2025-04-08 RX ADMIN — Medication 1 TABLET(S): at 11:37

## 2025-04-08 RX ADMIN — INSULIN LISPRO 1: 100 INJECTION, SOLUTION INTRAVENOUS; SUBCUTANEOUS at 08:31

## 2025-04-08 RX ADMIN — INSULIN GLARGINE-YFGN 17 UNIT(S): 100 INJECTION, SOLUTION SUBCUTANEOUS at 21:54

## 2025-04-08 RX ADMIN — Medication 500 MILLIGRAM(S): at 11:37

## 2025-04-08 RX ADMIN — Medication 1 APPLICATION(S): at 11:38

## 2025-04-08 RX ADMIN — LACTULOSE 20 GRAM(S): 10 SOLUTION ORAL at 10:50

## 2025-04-08 NOTE — PROGRESS NOTE ADULT - ASSESSMENT
89yo F w/ hx of DM2, HTN, OA, spinal stenosis, CKD III, chronic anemia, hyperthyroidism, recently found meningioma with vasogenic edema, compression of the left frontal horn, and slight midline shift on CT head during admssion at  3/2025 for difficulty ambulating. No acute NSurg intervention, started on keppra 250BID for seizure ppx. Pt BIBEMS for AMS. Family at bedside notes patient started to become less verbal around noontime yesterday, baseline AAO X4.  Found blood glucose elevated over 200 was given 3 units of Admelog.  Later in the day blood glucose became less than 90s.  Patient became nonverbal prompting family calling EMS. BGL on presentation 82.      meningioma with vasogenic edema,   - decadron taper to 2 bid per NS over 2 weeks  - mr of brain with contrast done  - family refused surgery    diabetes  - fs qid  - insulin ss  - hgb a1c 7.6  - endo following    constipation  - seen a  - mirallax  - lactulose    Ethics   - GO and 30 minutes for advanced care planning discussion separate and in addition to the E&M service provided was discussed with daughter  pt can benefit from peg due to inconsistent po intake and medication intake.  I spoke with family who are undecided.  - pt was seen by  palliative care as well  - d/c planning

## 2025-04-08 NOTE — DISCHARGE NOTE PROVIDER - PROVIDER TOKENS
PROVIDER:[TOKEN:[[id number]],FOLLOWUP:[1 week]],PROVIDER:[TOKEN:[[id number]],FOLLOWUP:[1 week]],PROVIDER:[TOKEN:[[id number]],FOLLOWUP:[1 month]]

## 2025-04-08 NOTE — DISCHARGE NOTE PROVIDER - NSDCFUADDAPPT_GEN_ALL_CORE_FT
APPTS ARE READY TO BE MADE: [X] YES    Best Family or Patient Contact (if needed):    Additional Information about above appointments (if needed):    1:   2:   3:     Other comments or requests:    APPTS ARE READY TO BE MADE: [X] YES    Best Family or Patient Contact (if needed):    Additional Information about above appointments (if needed):    1: [facility]; [phone]   2:   3:     Other comments or requests:    APPTS ARE READY TO BE MADE: [X] YES    Best Family or Patient Contact (if needed):    Additional Information about above appointments (if needed):    1: [facility]; [phone]ai[address]   2:   3:     Other comments or requests:     Patient is being transferred. Caregiver will arrange follow up.

## 2025-04-08 NOTE — DISCHARGE NOTE PROVIDER - CARE PROVIDER_API CALL
Edith Sandoval  Internal Medicine  [address] [place] [address]  Phone: [phone]  Fax: [phone]  Follow Up Time: 1 week    Fred Carlson  Neurosurgery  [address] [facility] [address]  [place] [address]  Phone: [phone]  Fax: [phone]  Follow Up Time: 1 week    Janice Romero)  Endocrinology/Metab/Diabetes  [address] [place] [id number]  Phone: [phone]  Fax: [phone]  Follow Up Time: 1 month

## 2025-04-08 NOTE — DISCHARGE NOTE PROVIDER - NSDCMRMEDTOKEN_GEN_ALL_CORE_FT
ALPRAZolam 0.25 mg oral tablet: 1 tab(s) orally as needed for anxiety  amLODIPine 10 mg oral tablet: 1 tab(s) orally once a day  amLODIPine 10 mg oral tablet: 1 tab(s) orally once a day  atorvastatin 10 mg oral tablet: 1 tab(s) orally once a day  atorvastatin 10 mg oral tablet: 1 tab(s) orally once a day  baclofen 10 mg oral tablet: 1 tab(s) orally as needed  Basaglar KwikPen 100 units/mL subcutaneous solution: 10 unit(s) subcutaneous once a day  hydrALAZINE 50 mg oral tablet: 1 tab(s) orally 2 times a day  hydrALAZINE 50 mg oral tablet: 1 tab(s) orally 2 times a day (with meals)  Januvia 50 mg oral tablet: 1 tab(s) orally once a day  Lantus Solostar Pen 100 units/mL subcutaneous solution: Inject 12- 13 units subcutaneous depending on sugar once a day in the morning  levETIRAcetam 250 mg oral tablet: 1 tab(s) orally 2 times a day  levETIRAcetam 500 mg oral tablet: 0.5 tab(s) orally 2 times a day 250mg  methIMAzole 5 mg oral tablet: 1 tab(s) orally once a day  methIMAzole 5 mg oral tablet: 1 tab(s) orally once a day  NovoLOG FlexPen 100 units/mL injectable solution: 2 unit(s) injectable 3 times a day  NovoLOG FlexPen 100 units/mL injectable solution: Inject Per sliding scale, if &gt;140 2 units, 180, 4 units subcutaneous 3 times a day  oxycodone-acetaminophen 5 mg-325 mg oral tablet: 1 tab(s) orally every 6 hours as needed for pain   ALPRAZolam 0.25 mg oral tablet: 1 tab(s) orally as needed for anxiety  amLODIPine 10 mg oral tablet: 1 tab(s) orally once a day  ascorbic acid 500 mg oral tablet: 1 tab(s) orally once a day  Basaglar KwikPen 100 units/mL subcutaneous solution: 10 unit(s) subcutaneous once a day  hydrALAZINE 50 mg oral tablet: 1 tab(s) orally 2 times a day  Januvia 50 mg oral tablet: 1 tab(s) orally once a day  Lantus Solostar Pen 100 units/mL subcutaneous solution: Inject 12- 13 units subcutaneous depending on sugar once a day in the morning  levETIRAcetam 250 mg oral tablet: 1 tab(s) orally 2 times a day  levETIRAcetam 500 mg oral tablet: 0.5 tab(s) orally 2 times a day 250mg  methIMAzole 5 mg oral tablet: 1 tab(s) orally once a day  Multiple Vitamins with Minerals oral tablet: 1 tab(s) orally once a day  NovoLOG FlexPen 100 units/mL injectable solution: 2 unit(s) injectable 3 times a day  NovoLOG FlexPen 100 units/mL injectable solution: Inject Per sliding scale, if &gt;140 2 units, 180, 4 units subcutaneous 3 times a day  oxycodone-acetaminophen 5 mg-325 mg oral tablet: 1 tab(s) orally every 6 hours as needed for pain  polyethylene glycol 3350 oral powder for reconstitution: 17 gram(s) orally once a day  senna leaf extract oral tablet: 2 tab(s) orally once a day (at bedtime)   amLODIPine 10 mg oral tablet: 1 tab(s) orally once a day  ascorbic acid 500 mg oral tablet: 1 tab(s) orally once a day  Basaglar KwikPen 100 units/mL subcutaneous solution: 10 unit(s) subcutaneous once a day  dexAMETHasone 4 mg oral tablet: 1 tab(s) orally every 12 hours DXA taper 4mg BID x 14 days; then 2mg(0.5 tab)x 14 days. F/U Neurosurgery 1 week for further Rx  hydrALAZINE 50 mg oral tablet: 1 tab(s) orally 2 times a day  Januvia 50 mg oral tablet: 1 tab(s) orally once a day  Lantus Solostar Pen 100 units/mL subcutaneous solution: Inject 12- 13 units subcutaneous depending on sugar once a day in the morning  methIMAzole 5 mg oral tablet: 1 tab(s) orally once a day  Multiple Vitamins with Minerals oral tablet: 1 tab(s) orally once a day  NovoLOG FlexPen 100 units/mL injectable solution: 2 unit(s) injectable 3 times a day  NovoLOG FlexPen 100 units/mL injectable solution: Inject Per sliding scale, if &gt;140 2 units, 180, 4 units subcutaneous 3 times a day  polyethylene glycol 3350 oral powder for reconstitution: 17 gram(s) orally once a day  senna leaf extract oral tablet: 2 tab(s) orally once a day (at bedtime)   amLODIPine 10 mg oral tablet: 1 tab(s) orally once a day  ascorbic acid 500 mg oral tablet: 1 tab(s) orally once a day  dexAMETHasone 4 mg oral tablet: 1 tab(s) orally every 12 hours DXA taper 4mg BID x 14 days; then 2mg(0.5 tab)x 14 days. F/U Neurosurgery 1 week for further Rx  hydrALAZINE 50 mg oral tablet: 1 tab(s) orally 2 times a day  Lantus Solostar Pen 100 units/mL subcutaneous solution: 20 subcutaneous once a day (at bedtime) Inject 20 units at bedtime per Endocrine 4/11  methIMAzole 5 mg oral tablet: 1 tab(s) orally once a day  Multiple Vitamins with Minerals oral tablet: 1 tab(s) orally once a day  NovoLOG FlexPen 100 units/mL injectable solution: 1 dose(s) injectable 3 times a day Inject Per sliding scale, if &gt;140 2 units, 180, 4 units subcutaneous 3 times a day  polyethylene glycol 3350 oral powder for reconstitution: 17 gram(s) orally once a day  senna leaf extract oral tablet: 2 tab(s) orally once a day (at bedtime)

## 2025-04-08 NOTE — PROGRESS NOTE ADULT - SUBJECTIVE AND OBJECTIVE BOX
Chief complaint    Patient is a 89y old  Female who presents with a chief complaint of AMS (08 Apr 2025 13:33)   Review of systems  Patient appears comfortable.    Labs and Fingersticks  CAPILLARY BLOOD GLUCOSE      POCT Blood Glucose.: 221 mg/dL (08 Apr 2025 16:25)  POCT Blood Glucose.: 278 mg/dL (08 Apr 2025 11:53)  POCT Blood Glucose.: 194 mg/dL (08 Apr 2025 07:54)  POCT Blood Glucose.: 210 mg/dL (07 Apr 2025 21:26)                        Medications  MEDICATIONS  (STANDING):  amLODIPine   Tablet 10 milliGRAM(s) Oral daily  ascorbic acid 500 milliGRAM(s) Oral daily  chlorhexidine 2% Cloths 1 Application(s) Topical daily  dexAMETHasone  Injectable 4 milliGRAM(s) IV Push every 12 hours  dextrose 5%. 1000 milliLiter(s) (50 mL/Hr) IV Continuous <Continuous>  dextrose 5%. 1000 milliLiter(s) (100 mL/Hr) IV Continuous <Continuous>  dextrose 50% Injectable 25 Gram(s) IV Push once  dextrose 50% Injectable 12.5 Gram(s) IV Push once  dextrose 50% Injectable 25 Gram(s) IV Push once  glucagon  Injectable 1 milliGRAM(s) IntraMuscular once  heparin   Injectable 5000 Unit(s) SubCutaneous every 12 hours  hydrALAZINE 50 milliGRAM(s) Oral two times a day  influenza  Vaccine (HIGH DOSE) 0.5 milliLiter(s) IntraMuscular once  insulin glargine Injectable (LANTUS) 17 Unit(s) SubCutaneous at bedtime  insulin lispro (ADMELOG) corrective regimen sliding scale   SubCutaneous three times a day before meals  insulin lispro (ADMELOG) corrective regimen sliding scale   SubCutaneous at bedtime  insulin lispro Injectable (ADMELOG) 8 Unit(s) SubCutaneous three times a day before meals  methimazole 5 milliGRAM(s) Oral daily  multivitamin/minerals 1 Tablet(s) Oral daily  polyethylene glycol 3350 17 Gram(s) Oral daily      Physical Exam  General: Patient appears comfortable.  Vital Signs Last 12 Hrs  T(F): 97.3 (04-08-25 @ 16:27), Max: 97.5 (04-08-25 @ 07:37)  HR: 66 (04-08-25 @ 16:27) (61 - 66)  BP: 133/76 (04-08-25 @ 16:27) (133/76 - 150/66)  BP(mean): --  RR: 17 (04-08-25 @ 16:27) (17 - 18)  SpO2: 97% (04-08-25 @ 16:27) (97% - 99%)  Neck: No palpable thyroid nodules.  CVS: S1S2, No murmurs  Respiratory: No wheezing, no crepitations  GI: Abdomen soft, non tender.    Diagnostics    Free Thyroxine, Serum: AM Sched. Collection: 03-Apr-2025 06:00 (04-02 @ 08:18)  Thyroid Stimulating Hormone, Serum: AM Sched. Collection: 03-Apr-2025 06:00 (04-02 @ 08:18)      Radiology:

## 2025-04-08 NOTE — PROGRESS NOTE ADULT - ASSESSMENT
89yo F w/ hx of DM2, HTN, OA, spinal stenosis, CKD III, chronic anemia, hyperthyroidism, recently found meningioma with vasogenic edema, compression of the left frontal horn, and slight midline shift on CT head during admssion at  3/2025 for difficulty ambulating. No acute NSurg intervention, started on keppra 250BID for seizure ppx. Pt BIBEMS for AMS.      Assessment  DMT2: 89y Female with DM T2 with hyperglycemia, A1C 8.3% , was on oral meds and insulin at home, now on basal bolus insulin with coverage, blood sugars are stable.  Hyperthyroid: On MMI 5 mg daily, TFTs euthyroid .   HTN: on antihypertensive medications, monitored, asymptomatic.    Janice Romero MD  Cell: [phone]  Office: [phone]

## 2025-04-08 NOTE — DISCHARGE NOTE PROVIDER - CARE PROVIDERS DIRECT ADDRESSES
,DirectAddress_Unknown,[email or web address],DirectAddress_Unknown

## 2025-04-08 NOTE — DISCHARGE NOTE PROVIDER - NSDCCPCAREPLAN_GEN_ALL_CORE_FT
PRINCIPAL DISCHARGE DIAGNOSIS  Diagnosis: Altered mental status  Assessment and Plan of Treatment: In setting of meningeoma with vasogenic edema showed on CT and MRI head. Recommended supportive care. Now improved with Dexamethasone. Follow up with home hospice.      SECONDARY DISCHARGE DIAGNOSES  Diagnosis: HTN (hypertension)  Assessment and Plan of Treatment: Continue current medications.    Diagnosis: DM2 (diabetes mellitus, type 2)  Assessment and Plan of Treatment: Noted hypoglycemic episode . Endocrine was consulted. Now resolved with improved po intake.    Diagnosis: Constipation  Assessment and Plan of Treatment: Laxatives and enema given, had bM. Continue laxatives as recommended.

## 2025-04-08 NOTE — CHART NOTE - NSCHARTNOTEFT_GEN_A_CORE
Palliative team following for GOC.  Multiple discussions held by primary team and palliative team regarding code status and nutritional goals of care.   Family has not yet made decisions. Pt is planned for d/c.  Recommend ongoing GOC with PCP upon discharge. Case discussed with primary team ACP.    This patient would also benefit from referral to the [facility].  Please include following information in discharge summary:    Location: [address] [place] [address]  Phone: [phone]    Case discussed with primary team ACP. Will sign off.    Jailyn Taylor MD  Geriatrics and Palliative Medicine Attending  [facility] pager: [phone]

## 2025-04-08 NOTE — PROGRESS NOTE ADULT - SUBJECTIVE AND OBJECTIVE BOX
DATE OF SERVICE: 04-08-25 @ 11:52    Patient is a 89y old  Female who presents with a chief complaint of AMS (07 Apr 2025 15:47)      SUBJECTIVE / OVERNIGHT EVENTS:  alert, eating, no bm over 3 days    MEDICATIONS  (STANDING):  amLODIPine   Tablet 10 milliGRAM(s) Oral daily  ascorbic acid 500 milliGRAM(s) Oral daily  chlorhexidine 2% Cloths 1 Application(s) Topical daily  dexAMETHasone  Injectable 4 milliGRAM(s) IV Push every 12 hours  dextrose 5%. 1000 milliLiter(s) (50 mL/Hr) IV Continuous <Continuous>  dextrose 5%. 1000 milliLiter(s) (100 mL/Hr) IV Continuous <Continuous>  dextrose 50% Injectable 25 Gram(s) IV Push once  dextrose 50% Injectable 12.5 Gram(s) IV Push once  dextrose 50% Injectable 25 Gram(s) IV Push once  glucagon  Injectable 1 milliGRAM(s) IntraMuscular once  heparin   Injectable 5000 Unit(s) SubCutaneous every 12 hours  hydrALAZINE 50 milliGRAM(s) Oral two times a day  influenza  Vaccine (HIGH DOSE) 0.5 milliLiter(s) IntraMuscular once  insulin glargine Injectable (LANTUS) 17 Unit(s) SubCutaneous at bedtime  insulin lispro (ADMELOG) corrective regimen sliding scale   SubCutaneous three times a day before meals  insulin lispro (ADMELOG) corrective regimen sliding scale   SubCutaneous at bedtime  insulin lispro Injectable (ADMELOG) 8 Unit(s) SubCutaneous three times a day before meals  lactulose Syrup 20 Gram(s) Oral every 4 hours  methimazole 5 milliGRAM(s) Oral daily  multivitamin/minerals 1 Tablet(s) Oral daily    MEDICATIONS  (PRN):  dextrose Oral Gel 15 Gram(s) Oral once PRN Blood Glucose LESS THAN 70 milliGRAM(s)/deciliter  polyethylene glycol 3350 17 Gram(s) Oral daily PRN Constipation      Vital Signs Last 24 Hrs  T(C): 36.4 (08 Apr 2025 07:37), Max: 37.2 (08 Apr 2025 00:15)  T(F): 97.5 (08 Apr 2025 07:37), Max: 98.9 (08 Apr 2025 00:15)  HR: 61 (08 Apr 2025 07:37) (54 - 61)  BP: 150/66 (08 Apr 2025 07:37) (150/66 - 188/73)  BP(mean): --  RR: 18 (08 Apr 2025 07:37) (18 - 18)  SpO2: 99% (08 Apr 2025 07:37) (98% - 99%)    Parameters below as of 08 Apr 2025 07:37  Patient On (Oxygen Delivery Method): room air      CAPILLARY BLOOD GLUCOSE      POCT Blood Glucose.: 194 mg/dL (08 Apr 2025 07:54)  POCT Blood Glucose.: 210 mg/dL (07 Apr 2025 21:26)  POCT Blood Glucose.: 229 mg/dL (07 Apr 2025 17:17)    I&O's Summary    07 Apr 2025 07:01  -  08 Apr 2025 07:00  --------------------------------------------------------  IN: 0 mL / OUT: 950 mL / NET: -950 mL        PHYSICAL EXAM:  GENERAL: NAD, well-developed  HEAD:  Atraumatic, Normocephalic  EYES: EOMI, PERRLA, conjunctiva and sclera clear  NECK: Supple, No JVD  CHEST/LUNG: Clear to auscultation bilaterally; No wheeze  HEART: Regular rate and rhythm; No murmurs, rubs, or gallops  ABDOMEN: Soft, Nontender, Nondistended; Bowel sounds present  EXTREMITIES:  2+ Peripheral Pulses, No clubbing, cyanosis, or edema  PSYCH: AAOx3  NEUROLOGY: non-focal  SKIN: No rashes or lesions    LABS:                    RADIOLOGY & ADDITIONAL TESTS:    Imaging Personally Reviewed:    Consultant(s) Notes Reviewed:      Care Discussed with Consultants/Other Providers:

## 2025-04-08 NOTE — DISCHARGE NOTE PROVIDER - HOSPITAL COURSE
HPI:  89yo F w/ hx of DM2, HTN, OA, spinal stenosis, CKD III, chronic anemia, hyperthyroidism, recently found meningioma with vasogenic edema, compression of the left frontal horn, and slight midline shift on CT head during admssion at  3/2025 for difficulty ambulating. No acute NSurg intervention, started on keppra 250BID for seizure ppx. Pt BIBEMS for AMS. Family at bedside notes patient started to become less verbal around noontime yesterday, baseline AAO X4.  Found blood glucose elevated over 200 was given 3 units of Admelog.  Later in the day blood glucose became less than 90s.  Patient became nonverbal prompting family calling EMS. BGL on presentation 82.   (31 Mar 2025 13:22)    Hospital Course:      Important Medication Changes and Reason:    Active or Pending Issues Requiring Follow-up:    Advanced Directives:   [ ] Full code  [ ] DNR  [ ] Hospice    Discharge Diagnoses:         HPI:  89yo F w/ hx of DM2, HTN, OA, spinal stenosis, CKD III, chronic anemia, hyperthyroidism, recently found meningioma with vasogenic edema, compression of the left frontal horn, and slight midline shift on CT head during admssion at  3/2025 for difficulty ambulating. No acute NSurg intervention, started on keppra 250BID for seizure ppx. Pt BIBEMS for AMS. Family at bedside notes patient started to become less verbal around noontime yesterday, baseline AAO X4.  Found blood glucose elevated over 200 was given 3 units of Admelog.  Later in the day blood glucose became less than 90s.  Patient became nonverbal prompting family calling EMS. BGL on presentation 82.   (31 Mar 2025 13:22)    Hospital Course:  Patient: 89-year-old female with a history of diabetes mellitus type 2 (DM2), hypertension (HTN), osteoarthritis (OA), spinal stenosis, chronic kidney disease stage III (CKD III), chronic anemia, hyperthyroidism, and a recently discovered meningioma with vasogenic edema, compression of the left frontal horn, and slight midline shift on CT head.    Reason for Admission: Patient presented via EMS with altered mental status (AMS). Family reported decreased verbal responsiveness beginning around noon the day prior to admission, with baseline alert and oriented x4. Elevated blood glucose (>200 mg/dL) was treated at home with 3 units of Admelog. Subsequently, the patient experienced hypoglycemia (blood glucose <90s mg/dL) and became nonverbal, prompting family to call EMS. Blood glucose on arrival was 82 mg/dL.    Hospital Course:    The patient was admitted for evaluation and management of her AMS. The AMS was likely multifactorial, attributed to a combination of fluctuations in blood glucose, the underlying meningioma, and her advanced age. She was started on Keppra 250mg BID for seizure prophylaxis as a precaution given the meningioma. A repeat MRI of the brain with contrast was performed. Neurosurgery was consulted during a previous admission in March 2025 for the meningioma, but no acute surgical intervention was pursued at that time. The family declined surgical intervention during this admission as well. The patient was started on a dexamethasone taper (per neurosurgery recommendations) to 2mg BID. Endocrinology was consulted for management of her DM2 and recommended a sliding scale insulin regimen and frequent finger stick glucose monitoring. Her Hgb A1c on admission was 7.6%. Due to inconsistent oral intake and difficulty with medication management, a percutaneous endoscopic gastrostomy (PEG) tube was considered, but the family remained undecided at the time of discharge. Palliative care was consulted for goals of care (GOC) discussion and provided counseling to the patient's family. A separate, dedicated 30-minute session was also conducted for advanced care planning. The patient is currently full code.    Assessment:    Decreased Oral Intake: Inconsistent PO intake raising concerns for adequate nutrition and medication management.  Meningioma: Stable, managed conservatively per family wishes.  Advanced Care Planning: Goals of care discussed with family and palliative care; patient remains full code.  Palliative Care Involvement: Ongoing for symptom management and goals of care discussions.  Plan:    Nutrition: Continue to monitor oral intake and re-address the possibility of PEG placement with family.  Meningioma: Continue dexamethasone taper as directed by neurosurgery and follow up with outpatient neurology/neurosurgery as recommended.  Advanced Care Planning: Continue discussions with family as needed and ensure documentation of patient's wishes.  Palliative Care: Continue palliative care follow-up for symptom management and support.  Discharge Disposition: [Discharge location - e.g., home with home health, skilled nursing facility]    Discharge Medications: [List all discharge medications and dosages]    Discharge Instructions: Follow up with primary care physician, neurology/neurosurgery, and endocrinology as directed. Monitor blood glucose regularly and administer insulin as prescribed. Contact physician if any changes in mental status, new neurological symptoms, or difficulty with medication administration. Continue discussions with family regarding long-term care goals.        Important Medication Changes and Reason:    Active or Pending Issues Requiring Follow-up:    Advanced Directives:   [ ] Full code  [ ] DNR  [ ] Hospice    Discharge Diagnoses:         HPI:  89yo F w/ hx of DM2, HTN, OA, spinal stenosis, CKD III, chronic anemia, hyperthyroidism, recently found meningioma with vasogenic edema, compression of the left frontal horn, and slight midline shift on CT head during admssion at  3/2025 for difficulty ambulating. No acute NSurg intervention, started on keppra 250BID for seizure ppx. Pt BIBEMS for AMS. Family at bedside notes patient started to become less verbal around noontime yesterday, baseline AAO X4.  Found blood glucose elevated over 200 was given 3 units of Admelog.  Later in the day blood glucose became less than 90s.  Patient became nonverbal prompting family calling EMS. BGL on presentation 82.   (31 Mar 2025 13:22)    Hospital Course:  Patient: 89-year-old female with a history of diabetes mellitus type 2 (DM2), hypertension (HTN), osteoarthritis (OA), spinal stenosis, chronic kidney disease stage III (CKD III), chronic anemia, hyperthyroidism, and a recently discovered meningioma with vasogenic edema, compression of the left frontal horn, and slight midline shift on CT head.    Reason for Admission: Patient presented via EMS with altered mental status (AMS). Family reported decreased verbal responsiveness beginning around noon the day prior to admission, with baseline alert and oriented x4. Elevated blood glucose (>200 mg/dL) was treated at home with 3 units of Admelog. Subsequently, the patient experienced hypoglycemia (blood glucose <90s mg/dL) and became nonverbal, prompting family to call EMS. Blood glucose on arrival was 82 mg/dL.    Hospital Course:    The patient was admitted for evaluation and management of her AMS. The AMS was likely multifactorial, attributed to a combination of fluctuations in blood glucose, the underlying meningioma, and her advanced age. She was started on Keppra 250mg BID for seizure prophylaxis as a precaution given the meningioma. A repeat MRI of the brain with contrast was performed. Neurosurgery was consulted during a previous admission in March 2025 for the meningioma, but no acute surgical intervention was pursued at that time. The family declined surgical intervention during this admission as well. The patient was started on a dexamethasone taper (per neurosurgery recommendations) to 2mg BID. Endocrinology was consulted for management of her DM2 and recommended a sliding scale insulin regimen and frequent finger stick glucose monitoring. Her Hgb A1c on admission was 7.6%. Due to inconsistent oral intake and difficulty with medication management, a percutaneous endoscopic gastrostomy (PEG) tube was considered, but the family remained undecided at the time of discharge. Palliative care was consulted for goals of care (GOC) discussion and provided counseling to the patient's family. A separate, dedicated 30-minute session was also conducted for advanced care planning. The patient is currently full code. Family now agreeable to dc home with home hospice. C/O constipation, had bowel movemnet with laxatives and enema.             Important Medication Changes and Reason:    Active or Pending Issues Requiring Follow-up:    Advanced Directives:   [ ] Full code  [ ] DNR  [ ] Hospice    Discharge Diagnoses:

## 2025-04-09 ENCOUNTER — TRANSCRIPTION ENCOUNTER (OUTPATIENT)
Age: 89
End: 2025-04-09

## 2025-04-09 LAB
GLUCOSE BLDC GLUCOMTR-MCNC: 188 MG/DL — HIGH (ref 70–99)
GLUCOSE BLDC GLUCOMTR-MCNC: 190 MG/DL — HIGH (ref 70–99)
GLUCOSE BLDC GLUCOMTR-MCNC: 195 MG/DL — HIGH (ref 70–99)
GLUCOSE BLDC GLUCOMTR-MCNC: 196 MG/DL — HIGH (ref 70–99)

## 2025-04-09 RX ORDER — SENNA 187 MG
2 TABLET ORAL AT BEDTIME
Refills: 0 | Status: DISCONTINUED | OUTPATIENT
Start: 2025-04-09 | End: 2025-04-11

## 2025-04-09 RX ORDER — SALINE 7; 19 G/118ML; G/118ML
1 ENEMA RECTAL ONCE
Refills: 0 | Status: COMPLETED | OUTPATIENT
Start: 2025-04-09 | End: 2025-04-09

## 2025-04-09 RX ORDER — SENNA 187 MG
2 TABLET ORAL ONCE
Refills: 0 | Status: COMPLETED | OUTPATIENT
Start: 2025-04-09 | End: 2025-04-09

## 2025-04-09 RX ORDER — BISACODYL 5 MG
10 TABLET, DELAYED RELEASE (ENTERIC COATED) ORAL ONCE
Refills: 0 | Status: DISCONTINUED | OUTPATIENT
Start: 2025-04-09 | End: 2025-04-09

## 2025-04-09 RX ADMIN — HEPARIN SODIUM 5000 UNIT(S): 1000 INJECTION INTRAVENOUS; SUBCUTANEOUS at 17:05

## 2025-04-09 RX ADMIN — INSULIN LISPRO 1: 100 INJECTION, SOLUTION INTRAVENOUS; SUBCUTANEOUS at 12:05

## 2025-04-09 RX ADMIN — HEPARIN SODIUM 5000 UNIT(S): 1000 INJECTION INTRAVENOUS; SUBCUTANEOUS at 06:01

## 2025-04-09 RX ADMIN — Medication 2 TABLET(S): at 08:16

## 2025-04-09 RX ADMIN — SALINE 1 ENEMA: 7; 19 ENEMA RECTAL at 11:14

## 2025-04-09 RX ADMIN — INSULIN LISPRO 8 UNIT(S): 100 INJECTION, SOLUTION INTRAVENOUS; SUBCUTANEOUS at 17:06

## 2025-04-09 RX ADMIN — DEXAMETHASONE 4 MILLIGRAM(S): 0.5 TABLET ORAL at 06:01

## 2025-04-09 RX ADMIN — INSULIN LISPRO 8 UNIT(S): 100 INJECTION, SOLUTION INTRAVENOUS; SUBCUTANEOUS at 12:05

## 2025-04-09 RX ADMIN — INSULIN LISPRO 1: 100 INJECTION, SOLUTION INTRAVENOUS; SUBCUTANEOUS at 08:31

## 2025-04-09 RX ADMIN — Medication 1 TABLET(S): at 11:15

## 2025-04-09 RX ADMIN — AMLODIPINE BESYLATE 10 MILLIGRAM(S): 10 TABLET ORAL at 06:01

## 2025-04-09 RX ADMIN — Medication 50 MILLIGRAM(S): at 17:05

## 2025-04-09 RX ADMIN — INSULIN LISPRO 1: 100 INJECTION, SOLUTION INTRAVENOUS; SUBCUTANEOUS at 17:06

## 2025-04-09 RX ADMIN — Medication 1 APPLICATION(S): at 12:14

## 2025-04-09 RX ADMIN — Medication 50 MILLIGRAM(S): at 06:01

## 2025-04-09 RX ADMIN — INSULIN GLARGINE-YFGN 17 UNIT(S): 100 INJECTION, SOLUTION SUBCUTANEOUS at 21:44

## 2025-04-09 RX ADMIN — DEXAMETHASONE 4 MILLIGRAM(S): 0.5 TABLET ORAL at 17:05

## 2025-04-09 RX ADMIN — Medication 500 MILLIGRAM(S): at 11:15

## 2025-04-09 NOTE — DISCHARGE NOTE NURSING/CASE MANAGEMENT/SOCIAL WORK - PATIENT PORTAL LINK FT
You can access the FollowMyHealth Patient Portal offered by [facility] by registering at the following website: [email or web address]. By joining [employer]’s FollowMyHealth portal, you will also be able to view your health information using other applications (apps) compatible with our system.

## 2025-04-09 NOTE — PROGRESS NOTE ADULT - ASSESSMENT
89yo F w/ hx of DM2, HTN, OA, spinal stenosis, CKD III, chronic anemia, hyperthyroidism, recently found meningioma with vasogenic edema, compression of the left frontal horn, and slight midline shift on CT head during admssion at  3/2025 for difficulty ambulating. No acute NSurg intervention, started on keppra 250BID for seizure ppx. Pt BIBEMS for AMS. Family at bedside notes patient started to become less verbal around noontime yesterday, baseline AAO X4.  Found blood glucose elevated over 200 was given 3 units of Admelog.  Later in the day blood glucose became less than 90s.  Patient became nonverbal prompting family calling EMS. BGL on presentation 82.      meningioma with vasogenic edema,   - decadron taper to 2 bid per NS over 2 weeks  - mr of brain with contrast done  - family refused surgery    diabetes  - fs qid  - insulin ss  - hgb a1c 7.6  - endo following    constipation  - seen a  - mirallax  - lactulose    Ethics   - GO and 30 minutes for advanced care planning discussion separate and in addition to the E&M service provided was discussed with daughter  pt can benefit from peg due to inconsistent po intake and medication intake.  I spoke with family who are undecided.  - pt was seen by  palliative care as well  - d/c planning  - family requesting hospice eval dx brain tumor

## 2025-04-09 NOTE — PROGRESS NOTE ADULT - ASSESSMENT
89yo F w/ hx of DM2, HTN, OA, spinal stenosis, CKD III, chronic anemia, hyperthyroidism, recently found meningioma with vasogenic edema, compression of the left frontal horn, and slight midline shift on CT head during admssion at  3/2025 for difficulty ambulating. No acute NSurg intervention, started on keppra 250BID for seizure ppx. Pt BIBEMS for AMS.      Assessment  DMT2: 89y Female with DM T2 with hyperglycemia, A1C 8.3% , was on oral meds and insulin at home, now on basal bolus insulin with coverage, blood sugars are within acceptable range..  Hyperthyroid: On MMI 5 mg daily, TFTs euthyroid .   HTN: on antihypertensive medications, monitored, asymptomatic.    Janice Romero MD  Cell: [phone]  Office: [phone]

## 2025-04-09 NOTE — DISCHARGE NOTE NURSING/CASE MANAGEMENT/SOCIAL WORK - NSSCTYPOFSERV_GEN_ALL_CORE
Registered Nurse/ Physical Therapy (Resumption of care) Registered Nurse/pending hospital bed delivery  to home  Registered Nurse/hospital bed delivered 4/11 to home

## 2025-04-09 NOTE — PROGRESS NOTE ADULT - SUBJECTIVE AND OBJECTIVE BOX
Chief complaint    Patient is a 89y old  Female who presents with a chief complaint of AMS (08 Apr 2025 19:11)   Review of systems  Patient appears comfortable.    Labs and Fingersticks  CAPILLARY BLOOD GLUCOSE      POCT Blood Glucose.: 195 mg/dL (09 Apr 2025 11:49)  POCT Blood Glucose.: 196 mg/dL (09 Apr 2025 08:14)  POCT Blood Glucose.: 143 mg/dL (08 Apr 2025 21:18)  POCT Blood Glucose.: 221 mg/dL (08 Apr 2025 16:25)                        Medications  MEDICATIONS  (STANDING):  amLODIPine   Tablet 10 milliGRAM(s) Oral daily  ascorbic acid 500 milliGRAM(s) Oral daily  chlorhexidine 2% Cloths 1 Application(s) Topical daily  dexAMETHasone  Injectable 4 milliGRAM(s) IV Push every 12 hours  dextrose 5%. 1000 milliLiter(s) (100 mL/Hr) IV Continuous <Continuous>  dextrose 5%. 1000 milliLiter(s) (50 mL/Hr) IV Continuous <Continuous>  dextrose 50% Injectable 25 Gram(s) IV Push once  dextrose 50% Injectable 12.5 Gram(s) IV Push once  dextrose 50% Injectable 25 Gram(s) IV Push once  glucagon  Injectable 1 milliGRAM(s) IntraMuscular once  heparin   Injectable 5000 Unit(s) SubCutaneous every 12 hours  hydrALAZINE 50 milliGRAM(s) Oral two times a day  influenza  Vaccine (HIGH DOSE) 0.5 milliLiter(s) IntraMuscular once  insulin glargine Injectable (LANTUS) 17 Unit(s) SubCutaneous at bedtime  insulin lispro (ADMELOG) corrective regimen sliding scale   SubCutaneous three times a day before meals  insulin lispro (ADMELOG) corrective regimen sliding scale   SubCutaneous at bedtime  insulin lispro Injectable (ADMELOG) 8 Unit(s) SubCutaneous three times a day before meals  methimazole 5 milliGRAM(s) Oral daily  multivitamin/minerals 1 Tablet(s) Oral daily  polyethylene glycol 3350 17 Gram(s) Oral daily      Physical Exam  General: Patient appears comfortable.  Vital Signs Last 12 Hrs  T(F): 97.9 (04-09-25 @ 08:40), Max: 98.6 (04-09-25 @ 06:00)  HR: 66 (04-09-25 @ 08:40) (54 - 66)  BP: 160/74 (04-09-25 @ 08:40) (160/74 - 167/71)  BP(mean): --  RR: 18 (04-09-25 @ 08:40) (18 - 18)  SpO2: 96% (04-09-25 @ 08:40) (96% - 97%)  Neck: No palpable thyroid nodules.  CVS: S1S2, No murmurs  Respiratory: No wheezing, no crepitations  GI: Abdomen soft, non tender.    Diagnostics    Free Thyroxine, Serum: AM Sched. Collection: 03-Apr-2025 06:00 (04-02 @ 08:18)  Thyroid Stimulating Hormone, Serum: AM Sched. Collection: 03-Apr-2025 06:00 (04-02 @ 08:18)      Radiology:

## 2025-04-09 NOTE — DISCHARGE NOTE NURSING/CASE MANAGEMENT/SOCIAL WORK - FINANCIAL ASSISTANCE
[facility] provides services at a reduced cost to those who are determined to be eligible through [facility]’s financial assistance program. Information regarding [facility]’s financial assistance program can be found by going to [email or web address] or by calling [phone].

## 2025-04-09 NOTE — PROGRESS NOTE ADULT - SUBJECTIVE AND OBJECTIVE BOX
DATE OF SERVICE: 04-09-25 @ 16:20    Patient is a 89y old  Female who presents with a chief complaint of AMS (09 Apr 2025 13:12)      SUBJECTIVE / OVERNIGHT EVENTS:  awake  and alert. eating well.  family requesting hospice    MEDICATIONS  (STANDING):  amLODIPine   Tablet 10 milliGRAM(s) Oral daily  ascorbic acid 500 milliGRAM(s) Oral daily  chlorhexidine 2% Cloths 1 Application(s) Topical daily  dexAMETHasone  Injectable 4 milliGRAM(s) IV Push every 12 hours  dextrose 5%. 1000 milliLiter(s) (100 mL/Hr) IV Continuous <Continuous>  dextrose 5%. 1000 milliLiter(s) (50 mL/Hr) IV Continuous <Continuous>  dextrose 50% Injectable 25 Gram(s) IV Push once  dextrose 50% Injectable 12.5 Gram(s) IV Push once  dextrose 50% Injectable 25 Gram(s) IV Push once  glucagon  Injectable 1 milliGRAM(s) IntraMuscular once  heparin   Injectable 5000 Unit(s) SubCutaneous every 12 hours  hydrALAZINE 50 milliGRAM(s) Oral two times a day  influenza  Vaccine (HIGH DOSE) 0.5 milliLiter(s) IntraMuscular once  insulin glargine Injectable (LANTUS) 17 Unit(s) SubCutaneous at bedtime  insulin lispro (ADMELOG) corrective regimen sliding scale   SubCutaneous three times a day before meals  insulin lispro (ADMELOG) corrective regimen sliding scale   SubCutaneous at bedtime  insulin lispro Injectable (ADMELOG) 8 Unit(s) SubCutaneous three times a day before meals  methimazole 5 milliGRAM(s) Oral daily  multivitamin/minerals 1 Tablet(s) Oral daily  polyethylene glycol 3350 17 Gram(s) Oral daily    MEDICATIONS  (PRN):  dextrose Oral Gel 15 Gram(s) Oral once PRN Blood Glucose LESS THAN 70 milliGRAM(s)/deciliter      Vital Signs Last 24 Hrs  T(C): 36.8 (09 Apr 2025 16:03), Max: 37 (09 Apr 2025 06:00)  T(F): 98.3 (09 Apr 2025 16:03), Max: 98.6 (09 Apr 2025 06:00)  HR: 60 (09 Apr 2025 16:03) (54 - 66)  BP: 163/75 (09 Apr 2025 16:03) (131/73 - 167/71)  BP(mean): --  RR: 17 (09 Apr 2025 16:03) (17 - 18)  SpO2: 99% (09 Apr 2025 16:03) (96% - 99%)    Parameters below as of 09 Apr 2025 16:03  Patient On (Oxygen Delivery Method): room air      CAPILLARY BLOOD GLUCOSE      POCT Blood Glucose.: 195 mg/dL (09 Apr 2025 11:49)  POCT Blood Glucose.: 196 mg/dL (09 Apr 2025 08:14)  POCT Blood Glucose.: 143 mg/dL (08 Apr 2025 21:18)  POCT Blood Glucose.: 221 mg/dL (08 Apr 2025 16:25)    I&O's Summary      PHYSICAL EXAM:  GENERAL: NAD, well-developed  HEAD:  Atraumatic, Normocephalic  EYES: EOMI, PERRLA, conjunctiva and sclera clear  NECK: Supple, No JVD  CHEST/LUNG: Clear to auscultation bilaterally; No wheeze  HEART: Regular rate and rhythm; No murmurs, rubs, or gallops  ABDOMEN: Soft, Nontender, Nondistended; Bowel sounds present  EXTREMITIES:  2+ Peripheral Pulses, No clubbing, cyanosis, or edema  PSYCH: AAOx3  NEUROLOGY: non-focal  SKIN: No rashes or lesions    LABS:                    RADIOLOGY & ADDITIONAL TESTS:    Imaging Personally Reviewed:    Consultant(s) Notes Reviewed:      Care Discussed with Consultants/Other Providers:

## 2025-04-10 LAB
GLUCOSE BLDC GLUCOMTR-MCNC: 141 MG/DL — HIGH (ref 70–99)
GLUCOSE BLDC GLUCOMTR-MCNC: 147 MG/DL — HIGH (ref 70–99)
GLUCOSE BLDC GLUCOMTR-MCNC: 237 MG/DL — HIGH (ref 70–99)
GLUCOSE BLDC GLUCOMTR-MCNC: 268 MG/DL — HIGH (ref 70–99)
GLUCOSE BLDC GLUCOMTR-MCNC: 279 MG/DL — HIGH (ref 70–99)

## 2025-04-10 RX ORDER — INSULIN GLARGINE-YFGN 100 [IU]/ML
20 INJECTION, SOLUTION SUBCUTANEOUS AT BEDTIME
Refills: 0 | Status: DISCONTINUED | OUTPATIENT
Start: 2025-04-10 | End: 2025-04-11

## 2025-04-10 RX ORDER — INSULIN LISPRO 100 U/ML
9 INJECTION, SOLUTION INTRAVENOUS; SUBCUTANEOUS
Refills: 0 | Status: DISCONTINUED | OUTPATIENT
Start: 2025-04-10 | End: 2025-04-11

## 2025-04-10 RX ORDER — DEXAMETHASONE 0.5 MG/1
4 TABLET ORAL EVERY 12 HOURS
Refills: 0 | Status: DISCONTINUED | OUTPATIENT
Start: 2025-04-10 | End: 2025-04-11

## 2025-04-10 RX ADMIN — Medication 50 MILLIGRAM(S): at 17:11

## 2025-04-10 RX ADMIN — INSULIN LISPRO 3: 100 INJECTION, SOLUTION INTRAVENOUS; SUBCUTANEOUS at 08:08

## 2025-04-10 RX ADMIN — Medication 50 MILLIGRAM(S): at 05:56

## 2025-04-10 RX ADMIN — INSULIN LISPRO 8 UNIT(S): 100 INJECTION, SOLUTION INTRAVENOUS; SUBCUTANEOUS at 12:16

## 2025-04-10 RX ADMIN — AMLODIPINE BESYLATE 10 MILLIGRAM(S): 10 TABLET ORAL at 05:56

## 2025-04-10 RX ADMIN — INSULIN LISPRO 2: 100 INJECTION, SOLUTION INTRAVENOUS; SUBCUTANEOUS at 12:16

## 2025-04-10 RX ADMIN — Medication 1 TABLET(S): at 11:46

## 2025-04-10 RX ADMIN — INSULIN LISPRO 8 UNIT(S): 100 INJECTION, SOLUTION INTRAVENOUS; SUBCUTANEOUS at 17:11

## 2025-04-10 RX ADMIN — DEXAMETHASONE 4 MILLIGRAM(S): 0.5 TABLET ORAL at 05:56

## 2025-04-10 RX ADMIN — Medication 500 MILLIGRAM(S): at 11:46

## 2025-04-10 RX ADMIN — Medication 2 TABLET(S): at 11:50

## 2025-04-10 RX ADMIN — HEPARIN SODIUM 5000 UNIT(S): 1000 INJECTION INTRAVENOUS; SUBCUTANEOUS at 05:56

## 2025-04-10 RX ADMIN — Medication 1 APPLICATION(S): at 11:47

## 2025-04-10 RX ADMIN — POLYETHYLENE GLYCOL 3350 17 GRAM(S): 17 POWDER, FOR SOLUTION ORAL at 11:46

## 2025-04-10 RX ADMIN — DEXAMETHASONE 4 MILLIGRAM(S): 0.5 TABLET ORAL at 17:11

## 2025-04-10 RX ADMIN — INSULIN LISPRO 8 UNIT(S): 100 INJECTION, SOLUTION INTRAVENOUS; SUBCUTANEOUS at 08:09

## 2025-04-10 RX ADMIN — INSULIN LISPRO 3: 100 INJECTION, SOLUTION INTRAVENOUS; SUBCUTANEOUS at 17:10

## 2025-04-10 RX ADMIN — HEPARIN SODIUM 5000 UNIT(S): 1000 INJECTION INTRAVENOUS; SUBCUTANEOUS at 17:12

## 2025-04-10 RX ADMIN — INSULIN GLARGINE-YFGN 20 UNIT(S): 100 INJECTION, SOLUTION SUBCUTANEOUS at 21:33

## 2025-04-10 NOTE — PROGRESS NOTE ADULT - ASSESSMENT
89yo F w/ hx of DM2, HTN, OA, spinal stenosis, CKD III, chronic anemia, hyperthyroidism, recently found meningioma with vasogenic edema, compression of the left frontal horn, and slight midline shift on CT head during admssion at  3/2025 for difficulty ambulating. No acute NSurg intervention, started on keppra 250BID for seizure ppx. Pt BIBEMS for AMS. Family at bedside notes patient started to become less verbal around noontime yesterday, baseline AAO X4.  Found blood glucose elevated over 200 was given 3 units of Admelog.  Later in the day blood glucose became less than 90s.  Patient became nonverbal prompting family calling EMS. BGL on presentation 82.      meningioma with vasogenic edema,   - decadron taper to 2 bid per NS over 2 weeks  - mr of brain with contrast done  - family refused surgery    diabetes  - fs qid  - insulin ss  - hgb a1c 7.6  - endo following    constipation  - senna  - mirallax  - lactulose  - enema    Ethics   - GOC and 30 minutes for advanced care planning discussion separate and in addition to the E&M service provided was discussed with daughter  pt can benefit from peg due to inconsistent po intake and medication intake.  I spoke with family who are undecided.  - pt was seen by  palliative care as well  - d/c planning  - family requesting hospice eval dx brain tumor

## 2025-04-10 NOTE — PROVIDER CONTACT NOTE (OTHER) - SITUATION
Pt /73
Pt has blood glucose of 434 on POCT
Pt /82
/71
pt had routine VS done, VSS except BP elevated 173/71
Pt /80
Pt not cooperating taking oral medication

## 2025-04-10 NOTE — PROGRESS NOTE ADULT - ASSESSMENT
89yo F w/ hx of DM2, HTN, OA, spinal stenosis, CKD III, chronic anemia, hyperthyroidism, recently found meningioma with vasogenic edema, compression of the left frontal horn, and slight midline shift on CT head during admssion at  3/2025 for difficulty ambulating. No acute NSurg intervention, started on keppra 250BID for seizure ppx. Pt BIBEMS for AMS.      Assessment  DMT2: 89y Female with DM T2 with hyperglycemia, A1C 8.3% , was on oral meds and insulin at home, now on basal bolus insulin with coverage, blood sugars are elevated.   Hyperthyroid: On MMI 5 mg daily, TFTs euthyroid .   HTN: on antihypertensive medications, monitored, asymptomatic.      Discussed plan and management with Dr Nick Avilez NP - TEAMS  Janice Romero MD  Cell: [phone]  Office: [phone]          89yo F w/ hx of DM2, HTN, OA, spinal stenosis, CKD III, chronic anemia, hyperthyroidism, recently found meningioma with vasogenic edema, compression of the left frontal horn, and slight midline shift on CT head during admssion at  3/2025 for difficulty ambulating. No acute NSurg intervention, started on keppra 250BID for seizure ppx. Pt BIBEMS for AMS.    Assessment  DMT2: 89y Female with DM T2 with hyperglycemia, A1C 8.3% , was on oral meds and insulin at home, now on basal bolus insulin with coverage, blood sugars are elevated.   Hyperthyroid: On MMI 5 mg daily, TFTs euthyroid .   HTN: on antihypertensive medications, monitored, asymptomatic.      Discussed plan and management with Dr Nick Avilez NP - TEAMS  Janice Romero MD  Cell: [phone]  Office: [phone]

## 2025-04-10 NOTE — PROVIDER CONTACT NOTE (OTHER) - REASON
/71
Pt /82
BP elevated 173/71
Pt not cooperating taking oral medication
Pt /80
Pt has blood glucose of 434
Pt /73

## 2025-04-10 NOTE — PROVIDER CONTACT NOTE (OTHER) - BACKGROUND
Pt admitted with AMS. PMHx HTN, diabetes, HLD
Pt admitted with AMS. PMHx diabetes, HTN, HLD, anemia
Pt has hx of DM, currently on IV steroids. Pt had diet advanced to pureed as well.
pmhx HTN on amlodipine and hydralyzine

## 2025-04-10 NOTE — PROVIDER CONTACT NOTE (OTHER) - NAME OF MD/NP/PA/DO NOTIFIED:
Mary Ellen Bardales
Tequila Murry
Mary Ellen Bardales
Gilles Vargas
SHAY Mary
SHAY Hastings
SHAY Ortiz

## 2025-04-10 NOTE — PROGRESS NOTE ADULT - SUBJECTIVE AND OBJECTIVE BOX
Chief complaint  Patient is a 89y old  Female who presents with a chief complaint of AMS (10 Apr 2025 17:22)         Labs and Fingersticks  CAPILLARY BLOOD GLUCOSE      POCT Blood Glucose.: 268 mg/dL (10 Apr 2025 16:34)  POCT Blood Glucose.: 237 mg/dL (10 Apr 2025 11:57)  POCT Blood Glucose.: 279 mg/dL (10 Apr 2025 07:55)  POCT Blood Glucose.: 190 mg/dL (09 Apr 2025 21:35)                        Medications  MEDICATIONS  (STANDING):  amLODIPine   Tablet 10 milliGRAM(s) Oral daily  ascorbic acid 500 milliGRAM(s) Oral daily  chlorhexidine 2% Cloths 1 Application(s) Topical daily  dexAMETHasone     Tablet 4 milliGRAM(s) Oral every 12 hours  dextrose 5%. 1000 milliLiter(s) (50 mL/Hr) IV Continuous <Continuous>  dextrose 5%. 1000 milliLiter(s) (100 mL/Hr) IV Continuous <Continuous>  dextrose 50% Injectable 25 Gram(s) IV Push once  dextrose 50% Injectable 12.5 Gram(s) IV Push once  dextrose 50% Injectable 25 Gram(s) IV Push once  glucagon  Injectable 1 milliGRAM(s) IntraMuscular once  heparin   Injectable 5000 Unit(s) SubCutaneous every 12 hours  hydrALAZINE 50 milliGRAM(s) Oral two times a day  influenza  Vaccine (HIGH DOSE) 0.5 milliLiter(s) IntraMuscular once  insulin glargine Injectable (LANTUS) 17 Unit(s) SubCutaneous at bedtime  insulin lispro (ADMELOG) corrective regimen sliding scale   SubCutaneous three times a day before meals  insulin lispro (ADMELOG) corrective regimen sliding scale   SubCutaneous at bedtime  insulin lispro Injectable (ADMELOG) 8 Unit(s) SubCutaneous three times a day before meals  methimazole 5 milliGRAM(s) Oral daily  multivitamin/minerals 1 Tablet(s) Oral daily  polyethylene glycol 3350 17 Gram(s) Oral daily  senna 2 Tablet(s) Oral at bedtime      Physical Exam  General: Patient comfortable in bed   Vital Signs Last 12 Hrs  T(F): 98.1 (04-10-25 @ 15:30), Max: 98.1 (04-10-25 @ 15:30)  HR: 73 (04-10-25 @ 15:30) (60 - 73)  BP: 140/64 (04-10-25 @ 15:30) (140/64 - 171/76)  BP(mean): --  RR: 17 (04-10-25 @ 15:30) (17 - 18)  SpO2: 98% (04-10-25 @ 15:30) (98% - 98%)    CVS: S1S2   Respiratory: No wheezing, no crepitations  GI: Abdomen soft, bowel sounds positive  Musculoskeletal:  moves all extremities  : Voiding          Chief complaint    Patient is a 89y old  Female who presents with a chief complaint of AMS (10 Apr 2025 17:22)         Labs and Fingersticks  CAPILLARY BLOOD GLUCOSE      POCT Blood Glucose.: 268 mg/dL (10 Apr 2025 16:34)  POCT Blood Glucose.: 237 mg/dL (10 Apr 2025 11:57)  POCT Blood Glucose.: 279 mg/dL (10 Apr 2025 07:55)  POCT Blood Glucose.: 190 mg/dL (09 Apr 2025 21:35)                        Medications  MEDICATIONS  (STANDING):  amLODIPine   Tablet 10 milliGRAM(s) Oral daily  ascorbic acid 500 milliGRAM(s) Oral daily  chlorhexidine 2% Cloths 1 Application(s) Topical daily  dexAMETHasone     Tablet 4 milliGRAM(s) Oral every 12 hours  dextrose 5%. 1000 milliLiter(s) (50 mL/Hr) IV Continuous <Continuous>  dextrose 5%. 1000 milliLiter(s) (100 mL/Hr) IV Continuous <Continuous>  dextrose 50% Injectable 25 Gram(s) IV Push once  dextrose 50% Injectable 12.5 Gram(s) IV Push once  dextrose 50% Injectable 25 Gram(s) IV Push once  glucagon  Injectable 1 milliGRAM(s) IntraMuscular once  heparin   Injectable 5000 Unit(s) SubCutaneous every 12 hours  hydrALAZINE 50 milliGRAM(s) Oral two times a day  influenza  Vaccine (HIGH DOSE) 0.5 milliLiter(s) IntraMuscular once  insulin glargine Injectable (LANTUS) 17 Unit(s) SubCutaneous at bedtime  insulin lispro (ADMELOG) corrective regimen sliding scale   SubCutaneous three times a day before meals  insulin lispro (ADMELOG) corrective regimen sliding scale   SubCutaneous at bedtime  insulin lispro Injectable (ADMELOG) 8 Unit(s) SubCutaneous three times a day before meals  methimazole 5 milliGRAM(s) Oral daily  multivitamin/minerals 1 Tablet(s) Oral daily  polyethylene glycol 3350 17 Gram(s) Oral daily  senna 2 Tablet(s) Oral at bedtime      Physical Exam  General: Patient comfortable in bed   Vital Signs Last 12 Hrs  T(F): 98.1 (04-10-25 @ 15:30), Max: 98.1 (04-10-25 @ 15:30)  HR: 73 (04-10-25 @ 15:30) (60 - 73)  BP: 140/64 (04-10-25 @ 15:30) (140/64 - 171/76)  BP(mean): --  RR: 17 (04-10-25 @ 15:30) (17 - 18)  SpO2: 98% (04-10-25 @ 15:30) (98% - 98%)    CVS: S1S2   Respiratory: No wheezing, no crepitations  GI: Abdomen soft, bowel sounds positive  Musculoskeletal:  moves all extremities  : Voiding

## 2025-04-10 NOTE — PROVIDER CONTACT NOTE (OTHER) - ASSESSMENT
pt has no s/s of HTN , no HA
PMH DM2, HTN, OA
Pt very sleepy but is arousable & alert. VSS, BP this /82
Pt A&Ox0-1, no s&s pain/discomfort
Pt is A&Ox0, unable to assess any s/s of hyperglycemia, but pt resting comfortably in bed and sleeping

## 2025-04-10 NOTE — PROGRESS NOTE ADULT - SUBJECTIVE AND OBJECTIVE BOX
DATE OF SERVICE: 04-10-25 @ 17:22    Patient is a 89y old  Female who presents with a chief complaint of AMS (09 Apr 2025 16:20)      SUBJECTIVE / OVERNIGHT EVENTS:  No chest pain. No shortness of breath. No complaints. No events overnight.     MEDICATIONS  (STANDING):  amLODIPine   Tablet 10 milliGRAM(s) Oral daily  ascorbic acid 500 milliGRAM(s) Oral daily  chlorhexidine 2% Cloths 1 Application(s) Topical daily  dexAMETHasone     Tablet 4 milliGRAM(s) Oral every 12 hours  dextrose 5%. 1000 milliLiter(s) (50 mL/Hr) IV Continuous <Continuous>  dextrose 5%. 1000 milliLiter(s) (100 mL/Hr) IV Continuous <Continuous>  dextrose 50% Injectable 25 Gram(s) IV Push once  dextrose 50% Injectable 12.5 Gram(s) IV Push once  dextrose 50% Injectable 25 Gram(s) IV Push once  glucagon  Injectable 1 milliGRAM(s) IntraMuscular once  heparin   Injectable 5000 Unit(s) SubCutaneous every 12 hours  hydrALAZINE 50 milliGRAM(s) Oral two times a day  influenza  Vaccine (HIGH DOSE) 0.5 milliLiter(s) IntraMuscular once  insulin glargine Injectable (LANTUS) 17 Unit(s) SubCutaneous at bedtime  insulin lispro (ADMELOG) corrective regimen sliding scale   SubCutaneous three times a day before meals  insulin lispro (ADMELOG) corrective regimen sliding scale   SubCutaneous at bedtime  insulin lispro Injectable (ADMELOG) 8 Unit(s) SubCutaneous three times a day before meals  methimazole 5 milliGRAM(s) Oral daily  multivitamin/minerals 1 Tablet(s) Oral daily  polyethylene glycol 3350 17 Gram(s) Oral daily  senna 2 Tablet(s) Oral at bedtime    MEDICATIONS  (PRN):  dextrose Oral Gel 15 Gram(s) Oral once PRN Blood Glucose LESS THAN 70 milliGRAM(s)/deciliter      Vital Signs Last 24 Hrs  T(C): 36.7 (10 Apr 2025 15:30), Max: 36.7 (10 Apr 2025 15:30)  T(F): 98.1 (10 Apr 2025 15:30), Max: 98.1 (10 Apr 2025 15:30)  HR: 73 (10 Apr 2025 15:30) (60 - 73)  BP: 140/64 (10 Apr 2025 15:30) (140/64 - 185/60)  BP(mean): --  RR: 17 (10 Apr 2025 15:30) (17 - 18)  SpO2: 98% (10 Apr 2025 15:30) (97% - 98%)    Parameters below as of 10 Apr 2025 15:30  Patient On (Oxygen Delivery Method): room air      CAPILLARY BLOOD GLUCOSE      POCT Blood Glucose.: 268 mg/dL (10 Apr 2025 16:34)  POCT Blood Glucose.: 237 mg/dL (10 Apr 2025 11:57)  POCT Blood Glucose.: 279 mg/dL (10 Apr 2025 07:55)  POCT Blood Glucose.: 190 mg/dL (09 Apr 2025 21:35)    I&O's Summary      PHYSICAL EXAM:  GENERAL: NAD, well-developed  HEAD:  Atraumatic, Normocephalic  EYES: EOMI, PERRLA, conjunctiva and sclera clear  NECK: Supple, No JVD  CHEST/LUNG: Clear to auscultation bilaterally; No wheeze  HEART: Regular rate and rhythm; No murmurs, rubs, or gallops  ABDOMEN: Soft, Nontender, Nondistended; Bowel sounds present  EXTREMITIES:  2+ Peripheral Pulses, No clubbing, cyanosis, or edema  NEUROLOGY: non-focal  SKIN: No rashes or lesions    LABS:                    RADIOLOGY & ADDITIONAL TESTS:    Imaging Personally Reviewed:    Consultant(s) Notes Reviewed:      Care Discussed with Consultants/Other Providers:

## 2025-04-10 NOTE — PROVIDER CONTACT NOTE (OTHER) - ACTION/TREATMENT ORDERED:
provider made aware
No action or treatment ordered yet at this time. Sliding scale given
Provider notified. No new orders at this time. Will continue to monitor.
NP made aware & AM amlodipine rescheduled to 8am
Provider made aware. Stat dose of hydralazine.
Provider made aware. Pt BP medication given.
NP made aware & to be re-checked in 2 hrs.

## 2025-04-10 NOTE — PROVIDER CONTACT NOTE (OTHER) - DATE AND TIME:
03-Apr-2025 06:02
01-Apr-2025 16:46
06-Apr-2025 22:50
08-Apr-2025 00:20
02-Apr-2025 00:43
08-Apr-2025 06:50
10-Apr-2025 23:58

## 2025-04-11 VITALS
HEART RATE: 63 BPM | OXYGEN SATURATION: 99 % | TEMPERATURE: 98 F | DIASTOLIC BLOOD PRESSURE: 77 MMHG | SYSTOLIC BLOOD PRESSURE: 157 MMHG | RESPIRATION RATE: 16 BRPM

## 2025-04-11 LAB
GLUCOSE BLDC GLUCOMTR-MCNC: 100 MG/DL — HIGH (ref 70–99)
GLUCOSE BLDC GLUCOMTR-MCNC: 84 MG/DL — SIGNIFICANT CHANGE UP (ref 70–99)

## 2025-04-11 RX ORDER — ATORVASTATIN CALCIUM 80 MG/1
1 TABLET, FILM COATED ORAL
Refills: 0 | DISCHARGE

## 2025-04-11 RX ORDER — DEXAMETHASONE 0.5 MG/1
1 TABLET ORAL
Qty: 42 | Refills: 0
Start: 2025-04-11 | End: 2025-05-08

## 2025-04-11 RX ORDER — OXYCODONE HYDROCHLORIDE AND ACETAMINOPHEN 10; 325 MG/1; MG/1
1 TABLET ORAL
Refills: 0 | DISCHARGE

## 2025-04-11 RX ORDER — METHIMAZOLE 5 MG
1 TABLET ORAL
Qty: 0 | Refills: 0 | DISCHARGE
Start: 2025-04-11

## 2025-04-11 RX ORDER — SITAGLIPTIN 100 MG/1
1 TABLET, FILM COATED ORAL
Refills: 0 | DISCHARGE

## 2025-04-11 RX ORDER — INSULIN ASPART 100 [IU]/ML
2 INJECTION, SOLUTION INTRAVENOUS; SUBCUTANEOUS
Refills: 0 | DISCHARGE

## 2025-04-11 RX ORDER — INSULIN ASPART 100 [IU]/ML
1 INJECTION, SOLUTION INTRAVENOUS; SUBCUTANEOUS
Qty: 0 | Refills: 0 | DISCHARGE

## 2025-04-11 RX ORDER — BACLOFEN 10 MG/20ML
1 INJECTION INTRATHECAL
Refills: 0 | DISCHARGE

## 2025-04-11 RX ORDER — INSULIN GLARGINE-YFGN 100 [IU]/ML
10 INJECTION, SOLUTION SUBCUTANEOUS
Qty: 0 | Refills: 0 | DISCHARGE

## 2025-04-11 RX ORDER — CYST/ALA/Q10/PHOS.SER/DHA/BROC 100-20-50
1 POWDER (GRAM) ORAL
Qty: 0 | Refills: 0 | DISCHARGE
Start: 2025-04-11

## 2025-04-11 RX ORDER — POLYETHYLENE GLYCOL 3350 17 G/17G
17 POWDER, FOR SOLUTION ORAL
Qty: 0 | Refills: 0 | DISCHARGE
Start: 2025-04-11

## 2025-04-11 RX ORDER — ALPRAZOLAM 0.5 MG
1 TABLET, EXTENDED RELEASE 24 HR ORAL
Refills: 0 | DISCHARGE

## 2025-04-11 RX ORDER — METHIMAZOLE 5 MG
1 TABLET ORAL
Refills: 0 | DISCHARGE

## 2025-04-11 RX ORDER — LEVETIRACETAM 10 MG/ML
0.5 INJECTION, SOLUTION INTRAVENOUS
Refills: 0 | DISCHARGE

## 2025-04-11 RX ORDER — AMLODIPINE BESYLATE 10 MG/1
1 TABLET ORAL
Refills: 0 | DISCHARGE

## 2025-04-11 RX ORDER — SENNA 187 MG
2 TABLET ORAL
Qty: 0 | Refills: 0 | DISCHARGE
Start: 2025-04-11

## 2025-04-11 RX ORDER — AMLODIPINE BESYLATE 10 MG/1
1 TABLET ORAL
Qty: 0 | Refills: 0 | DISCHARGE
Start: 2025-04-11

## 2025-04-11 RX ORDER — INSULIN GLARGINE-YFGN 100 [IU]/ML
20 INJECTION, SOLUTION SUBCUTANEOUS
Qty: 0 | Refills: 0 | DISCHARGE

## 2025-04-11 RX ADMIN — Medication 500 MILLIGRAM(S): at 12:07

## 2025-04-11 RX ADMIN — Medication 1 TABLET(S): at 12:06

## 2025-04-11 RX ADMIN — POLYETHYLENE GLYCOL 3350 17 GRAM(S): 17 POWDER, FOR SOLUTION ORAL at 12:07

## 2025-04-11 RX ADMIN — DEXAMETHASONE 4 MILLIGRAM(S): 0.5 TABLET ORAL at 05:06

## 2025-04-11 RX ADMIN — HEPARIN SODIUM 5000 UNIT(S): 1000 INJECTION INTRAVENOUS; SUBCUTANEOUS at 05:06

## 2025-04-11 RX ADMIN — AMLODIPINE BESYLATE 10 MILLIGRAM(S): 10 TABLET ORAL at 05:06

## 2025-04-11 RX ADMIN — Medication 50 MILLIGRAM(S): at 05:06

## 2025-04-11 RX ADMIN — INSULIN LISPRO 9 UNIT(S): 100 INJECTION, SOLUTION INTRAVENOUS; SUBCUTANEOUS at 08:19

## 2025-04-11 RX ADMIN — Medication 1 APPLICATION(S): at 12:10

## 2025-04-11 NOTE — PROGRESS NOTE ADULT - PROBLEM SELECTOR PROBLEM 3
Anemia
HTN (hypertension)
Anemia
HTN (hypertension)
HTN (hypertension)
Anemia
HTN (hypertension)
Advanced care planning/counseling discussion

## 2025-04-11 NOTE — PROGRESS NOTE ADULT - PROBLEM SELECTOR PLAN 2
Continue current MMI dose  FU outpt, rpt TFTs in 4-6 weeks.
Suggest to continue medications, monitoring, FU primary team recommendations.
Suggest to continue medications, monitoring, FU primary team recommendations.
Continue current MMI dose  FU outpt, rpt TFTs in 4-6 weeks.
Suggest to continue medications, monitoring, FU primary team recommendations.
Continue current MMI dose  FU outpt, rpt TFTs in 4-6 weeks.
Continue current MMI dose  FU outpt, rpt TFTs in 4-6 weeks.
continue conservative management

## 2025-04-11 NOTE — PROGRESS NOTE ADULT - PROBLEM SELECTOR PROBLEM 2
HTN (hypertension)
Hyperthyroidism
Meningioma

## 2025-04-11 NOTE — PROGRESS NOTE ADULT - PROVIDER SPECIALTY LIST ADULT
Internal Medicine
Internal Medicine
Endocrinology
Internal Medicine
Endocrinology
Palliative Care

## 2025-04-11 NOTE — PROGRESS NOTE ADULT - PROBLEM SELECTOR PLAN 1
Will continue current insulin regimen for now. Will continue monitoring  blood sugars, will Follow up.  Patient counseled for compliance with consistent low carb diet and physical activity as tolerated.  .
Will continue current insulin regimen for now. Will continue monitoring  blood sugars, will Follow up.  Patient counseled for compliance with consistent low carb diet and physical activity as tolerated.
Will continue current insulin regimen for now. Will continue monitoring  blood sugars, will Follow up.  Patient counseled for compliance with consistent low carb diet and physical activity as tolerated.
Will continue current insulin regimen for now.   Will continue monitoring FS, log, and glucose trends, will Follow up.  Patient counseled for compliance with consistent low carb diet and exercise as tolerated outpatient.
Will continue current insulin regimen for now. Will continue monitoring  blood sugars, will Follow up.  Patient counseled for compliance with consistent low carb diet and physical activity as tolerated.    DC Plan: May get DC home on current basal bolus insulin if sugars remain stable within acceptable range.  FU 4 weeks.
Will increase Lantus to 17 units at bed time.  Will increase Admelog to 8 units before each meal in addition to Admelog correction scale coverage.  Patient counseled for compliance with consistent low carb diet and physical activity as tolerated.  .
pt with inconsistent PO intake  HCPs considering evaluation for PEG but requesting more time to discuss with rest of family
Will continue current insulin regimen for now. Will continue monitoring  blood sugars, will Follow up.  Patient counseled for compliance with consistent low carb diet and physical activity as tolerated.
Will increase Lantus to 20  u at bedtime.  Will increase Admelog to 9 u before each meal and continue Admelog correction scale coverage. Will continue monitoring FS and Follow up.     DC Plan: May get DC home on current basal bolus insulin if sugars remain stable within acceptable range.  FU 4 weeks.
Will continue current insulin regimen for now. Will continue monitoring  blood sugars, will Follow up.  Patient counseled for compliance with consistent low carb diet and physical activity as tolerated.  .     DC Plan: May get DC home on current basal bolus insulin if sugars remain stable within acceptable range.  FU 4 weeks.

## 2025-04-11 NOTE — PROGRESS NOTE ADULT - SUBJECTIVE AND OBJECTIVE BOX
DATE OF SERVICE: 04-11-25 @ 11:14    Patient is a 89y old  Female who presents with a chief complaint of AMS (11 Apr 2025 10:06)      SUBJECTIVE / OVERNIGHT EVENTS:  No chest pain. No shortness of breath. No complaints. No events overnight.     MEDICATIONS  (STANDING):  amLODIPine   Tablet 10 milliGRAM(s) Oral daily  ascorbic acid 500 milliGRAM(s) Oral daily  chlorhexidine 2% Cloths 1 Application(s) Topical daily  dexAMETHasone     Tablet 4 milliGRAM(s) Oral every 12 hours  dextrose 5%. 1000 milliLiter(s) (100 mL/Hr) IV Continuous <Continuous>  dextrose 5%. 1000 milliLiter(s) (50 mL/Hr) IV Continuous <Continuous>  dextrose 50% Injectable 25 Gram(s) IV Push once  dextrose 50% Injectable 12.5 Gram(s) IV Push once  dextrose 50% Injectable 25 Gram(s) IV Push once  glucagon  Injectable 1 milliGRAM(s) IntraMuscular once  heparin   Injectable 5000 Unit(s) SubCutaneous every 12 hours  hydrALAZINE 50 milliGRAM(s) Oral two times a day  influenza  Vaccine (HIGH DOSE) 0.5 milliLiter(s) IntraMuscular once  insulin glargine Injectable (LANTUS) 20 Unit(s) SubCutaneous at bedtime  insulin lispro (ADMELOG) corrective regimen sliding scale   SubCutaneous three times a day before meals  insulin lispro (ADMELOG) corrective regimen sliding scale   SubCutaneous at bedtime  insulin lispro Injectable (ADMELOG) 9 Unit(s) SubCutaneous three times a day before meals  methimazole 5 milliGRAM(s) Oral daily  multivitamin/minerals 1 Tablet(s) Oral daily  polyethylene glycol 3350 17 Gram(s) Oral daily  senna 2 Tablet(s) Oral at bedtime    MEDICATIONS  (PRN):  dextrose Oral Gel 15 Gram(s) Oral once PRN Blood Glucose LESS THAN 70 milliGRAM(s)/deciliter      Vital Signs Last 24 Hrs  T(C): 36.5 (11 Apr 2025 09:31), Max: 36.9 (10 Apr 2025 23:54)  T(F): 97.7 (11 Apr 2025 09:31), Max: 98.4 (10 Apr 2025 23:54)  HR: 63 (11 Apr 2025 09:31) (58 - 73)  BP: 148/58 (11 Apr 2025 10:06) (138/64 - 180/75)  BP(mean): --  RR: 18 (11 Apr 2025 09:31) (17 - 18)  SpO2: 92% (11 Apr 2025 09:31) (91% - 99%)    Parameters below as of 11 Apr 2025 09:31  Patient On (Oxygen Delivery Method): room air      CAPILLARY BLOOD GLUCOSE      POCT Blood Glucose.: 100 mg/dL (11 Apr 2025 07:43)  POCT Blood Glucose.: 147 mg/dL (10 Apr 2025 21:32)  POCT Blood Glucose.: 141 mg/dL (10 Apr 2025 21:07)  POCT Blood Glucose.: 268 mg/dL (10 Apr 2025 16:34)  POCT Blood Glucose.: 237 mg/dL (10 Apr 2025 11:57)    I&O's Summary    10 Apr 2025 07:01  -  11 Apr 2025 07:00  --------------------------------------------------------  IN: 0 mL / OUT: 1000 mL / NET: -1000 mL        PHYSICAL EXAM:  GENERAL: NAD, well-developed  HEAD:  Atraumatic, Normocephalic  EYES: EOMI, PERRLA, conjunctiva and sclera clear  NECK: Supple, No JVD  CHEST/LUNG: Clear to auscultation bilaterally; No wheeze  HEART: Regular rate and rhythm; No murmurs, rubs, or gallops  ABDOMEN: Soft, Nontender, Nondistended; Bowel sounds present  EXTREMITIES:  2+ Peripheral Pulses, No clubbing, cyanosis, or edema  NEUROLOGY: non-focal  SKIN: No rashes or lesions    LABS:                    RADIOLOGY & ADDITIONAL TESTS:    Imaging Personally Reviewed:    Consultant(s) Notes Reviewed:      Care Discussed with Consultants/Other Providers:

## 2025-04-11 NOTE — PROGRESS NOTE ADULT - PROBLEM SELECTOR PLAN 4
Suggest to continue medications, monitoring, FU primary team recommendations.
Will continue to follow for ongoing GOC.    For acute issues or uncontrolled symptoms please page palliative team.    Jailyn Taylor MD  Geriatrics and Palliative Medicine Attending  [facility] pager: [phone]     The Geriatrics and Palliative Medicine consult service has 24/7 coverage for medical recommendations, including symptom management needs.

## 2025-04-11 NOTE — PROGRESS NOTE ADULT - PROBLEM SELECTOR PLAN 3
Suggest to continue medications, monitoring, FU primary team recommendations.
See GOC note  pt is full code
Suggest to continue medications, monitoring, FU primary team recommendations.

## 2025-04-11 NOTE — PROGRESS NOTE ADULT - ASSESSMENT
89yo F w/ hx of DM2, HTN, OA, spinal stenosis, CKD III, chronic anemia, hyperthyroidism, recently found meningioma with vasogenic edema, compression of the left frontal horn, and slight midline shift on CT head during admssion at  3/2025 for difficulty ambulating. No acute NSurg intervention, started on keppra 250BID for seizure ppx. Pt BIBEMS for AMS.    Assessment  DMT2: 89y Female with DM T2 with hyperglycemia, A1C 8.3% , was on oral meds and insulin at home, now on basal bolus insulin with coverage, blood sugars are stable.  Hyperthyroid: On MMI 5 mg daily, TFTs euthyroid .   HTN: on antihypertensive medications, monitored, asymptomatic.    Janice Romero MD  Cell: [phone]  Office: [phone]

## 2025-04-11 NOTE — PROGRESS NOTE ADULT - SUBJECTIVE AND OBJECTIVE BOX
Chief complaint    Patient is a 89y old  Female who presents with a chief complaint of AMS (10 Apr 2025 18:18)   Review of systems  Patient appears comfortable.    Labs and Fingersticks  CAPILLARY BLOOD GLUCOSE      POCT Blood Glucose.: 100 mg/dL (11 Apr 2025 07:43)  POCT Blood Glucose.: 147 mg/dL (10 Apr 2025 21:32)  POCT Blood Glucose.: 141 mg/dL (10 Apr 2025 21:07)  POCT Blood Glucose.: 268 mg/dL (10 Apr 2025 16:34)  POCT Blood Glucose.: 237 mg/dL (10 Apr 2025 11:57)                        Medications  MEDICATIONS  (STANDING):  amLODIPine   Tablet 10 milliGRAM(s) Oral daily  ascorbic acid 500 milliGRAM(s) Oral daily  chlorhexidine 2% Cloths 1 Application(s) Topical daily  dexAMETHasone     Tablet 4 milliGRAM(s) Oral every 12 hours  dextrose 5%. 1000 milliLiter(s) (100 mL/Hr) IV Continuous <Continuous>  dextrose 5%. 1000 milliLiter(s) (50 mL/Hr) IV Continuous <Continuous>  dextrose 50% Injectable 25 Gram(s) IV Push once  dextrose 50% Injectable 12.5 Gram(s) IV Push once  dextrose 50% Injectable 25 Gram(s) IV Push once  glucagon  Injectable 1 milliGRAM(s) IntraMuscular once  heparin   Injectable 5000 Unit(s) SubCutaneous every 12 hours  hydrALAZINE 50 milliGRAM(s) Oral two times a day  influenza  Vaccine (HIGH DOSE) 0.5 milliLiter(s) IntraMuscular once  insulin glargine Injectable (LANTUS) 20 Unit(s) SubCutaneous at bedtime  insulin lispro (ADMELOG) corrective regimen sliding scale   SubCutaneous three times a day before meals  insulin lispro (ADMELOG) corrective regimen sliding scale   SubCutaneous at bedtime  insulin lispro Injectable (ADMELOG) 9 Unit(s) SubCutaneous three times a day before meals  methimazole 5 milliGRAM(s) Oral daily  multivitamin/minerals 1 Tablet(s) Oral daily  polyethylene glycol 3350 17 Gram(s) Oral daily  senna 2 Tablet(s) Oral at bedtime      Physical Exam  General: Patient appears comfortable.  Vital Signs Last 12 Hrs  T(F): 97.7 (04-11-25 @ 09:31), Max: 98.4 (04-10-25 @ 23:54)  HR: 63 (04-11-25 @ 09:31) (58 - 66)  BP: 180/75 (04-11-25 @ 09:31) (138/64 - 180/75)  BP(mean): --  RR: 18 (04-11-25 @ 09:31) (17 - 18)  SpO2: 92% (04-11-25 @ 09:31) (91% - 99%)  Neck: No palpable thyroid nodules.  CVS: S1S2, No murmurs  Respiratory: No wheezing, no crepitations  GI: Abdomen soft, non tender.    Diagnostics    Free Thyroxine, Serum: AM Sched. Collection: 03-Apr-2025 06:00 (04-02 @ 08:18)  Thyroid Stimulating Hormone, Serum: AM Sched. Collection: 03-Apr-2025 06:00 (04-02 @ 08:18)      Radiology:

## 2025-04-11 NOTE — PROGRESS NOTE ADULT - PROBLEM SELECTOR PROBLEM 1
DM2 (diabetes mellitus, type 2)
Decreased oral intake

## 2025-04-18 ENCOUNTER — NON-APPOINTMENT (OUTPATIENT)
Age: 89
End: 2025-04-18

## 2025-07-03 ENCOUNTER — APPOINTMENT (OUTPATIENT)
Dept: NEUROLOGY | Facility: CLINIC | Age: 89
End: 2025-07-03